# Patient Record
Sex: FEMALE | Race: WHITE | Employment: FULL TIME | URBAN - METROPOLITAN AREA
[De-identification: names, ages, dates, MRNs, and addresses within clinical notes are randomized per-mention and may not be internally consistent; named-entity substitution may affect disease eponyms.]

---

## 2017-04-20 ENCOUNTER — APPOINTMENT (EMERGENCY)
Dept: RADIOLOGY | Facility: HOSPITAL | Age: 42
End: 2017-04-20
Payer: COMMERCIAL

## 2017-04-20 ENCOUNTER — HOSPITAL ENCOUNTER (EMERGENCY)
Facility: HOSPITAL | Age: 42
Discharge: HOME | End: 2017-04-20
Attending: EMERGENCY MEDICINE
Payer: COMMERCIAL

## 2017-04-20 VITALS
HEIGHT: 66 IN | HEART RATE: 78 BPM | RESPIRATION RATE: 18 BRPM | SYSTOLIC BLOOD PRESSURE: 137 MMHG | WEIGHT: 134 LBS | OXYGEN SATURATION: 98 % | BODY MASS INDEX: 21.53 KG/M2 | DIASTOLIC BLOOD PRESSURE: 79 MMHG | TEMPERATURE: 97.4 F

## 2017-04-20 DIAGNOSIS — R42 VERTIGO: Primary | ICD-10-CM

## 2017-04-20 DIAGNOSIS — R51 HEADACHE(784.0): ICD-10-CM

## 2017-04-20 LAB
ALBUMIN SERPL BCP-MCNC: 4 G/DL (ref 3.5–5)
ALP SERPL-CCNC: 44 U/L (ref 46–116)
ALT SERPL W P-5'-P-CCNC: 24 U/L (ref 12–78)
ANION GAP SERPL CALCULATED.3IONS-SCNC: 7 MMOL/L (ref 4–13)
AST SERPL W P-5'-P-CCNC: 8 U/L (ref 5–45)
ATRIAL RATE: 81 BPM
BASOPHILS # BLD AUTO: 0.01 THOUSANDS/ΜL (ref 0–0.1)
BASOPHILS NFR BLD AUTO: 0 % (ref 0–1)
BILIRUB SERPL-MCNC: 0.58 MG/DL (ref 0.2–1)
BILIRUB UR QL STRIP: NEGATIVE
BILIRUB UR QL STRIP: NEGATIVE
BUN SERPL-MCNC: 8 MG/DL (ref 5–25)
CALCIUM SERPL-MCNC: 8.9 MG/DL (ref 8.3–10.1)
CHLORIDE SERPL-SCNC: 105 MMOL/L (ref 100–108)
CLARITY UR: CLEAR
CLARITY UR: CLEAR
CO2 SERPL-SCNC: 28 MMOL/L (ref 21–32)
COLOR UR: YELLOW
COLOR UR: YELLOW
CREAT SERPL-MCNC: 0.62 MG/DL (ref 0.6–1.3)
EOSINOPHIL # BLD AUTO: 0.08 THOUSAND/ΜL (ref 0–0.61)
EOSINOPHIL NFR BLD AUTO: 1 % (ref 0–6)
ERYTHROCYTE [DISTWIDTH] IN BLOOD BY AUTOMATED COUNT: 12.5 % (ref 11.6–15.1)
GFR SERPL CREATININE-BSD FRML MDRD: >60 ML/MIN/1.73SQ M
GLUCOSE SERPL-MCNC: 94 MG/DL (ref 65–140)
GLUCOSE UR STRIP-MCNC: NEGATIVE MG/DL
GLUCOSE UR STRIP-MCNC: NEGATIVE MG/DL
HCT VFR BLD AUTO: 42.1 % (ref 34.8–46.1)
HGB BLD-MCNC: 14.6 G/DL (ref 11.5–15.4)
HGB UR QL STRIP.AUTO: NEGATIVE
HGB UR QL STRIP.AUTO: NEGATIVE
KETONES UR STRIP-MCNC: NEGATIVE MG/DL
KETONES UR STRIP-MCNC: NEGATIVE MG/DL
LEUKOCYTE ESTERASE UR QL STRIP: NEGATIVE
LEUKOCYTE ESTERASE UR QL STRIP: NEGATIVE
LYMPHOCYTES # BLD AUTO: 1.94 THOUSANDS/ΜL (ref 0.6–4.47)
LYMPHOCYTES NFR BLD AUTO: 29 % (ref 14–44)
MCH RBC QN AUTO: 29.9 PG (ref 26.8–34.3)
MCHC RBC AUTO-ENTMCNC: 34.7 G/DL (ref 31.4–37.4)
MCV RBC AUTO: 86 FL (ref 82–98)
MONOCYTES # BLD AUTO: 0.52 THOUSAND/ΜL (ref 0.17–1.22)
MONOCYTES NFR BLD AUTO: 8 % (ref 4–12)
NEUTROPHILS # BLD AUTO: 4.07 THOUSANDS/ΜL (ref 1.85–7.62)
NEUTS SEG NFR BLD AUTO: 62 % (ref 43–75)
NITRITE UR QL STRIP: NEGATIVE
NITRITE UR QL STRIP: NEGATIVE
NRBC BLD AUTO-RTO: 0 /100 WBCS
P AXIS: 66 DEGREES
PH UR STRIP.AUTO: 7 [PH] (ref 4.5–8)
PH UR STRIP.AUTO: 7 [PH] (ref 4.5–8)
PLATELET # BLD AUTO: 228 THOUSANDS/UL (ref 149–390)
PMV BLD AUTO: 10.6 FL (ref 8.9–12.7)
POTASSIUM SERPL-SCNC: 3.8 MMOL/L (ref 3.5–5.3)
PR INTERVAL: 132 MS
PROT SERPL-MCNC: 7 G/DL (ref 6.4–8.2)
PROT UR STRIP-MCNC: NEGATIVE MG/DL
PROT UR STRIP-MCNC: NEGATIVE MG/DL
QRS AXIS: 75 DEGREES
QRSD INTERVAL: 104 MS
QT INTERVAL: 358 MS
QTC INTERVAL: 415 MS
RBC # BLD AUTO: 4.88 MILLION/UL (ref 3.81–5.12)
SODIUM SERPL-SCNC: 140 MMOL/L (ref 136–145)
SP GR UR STRIP.AUTO: 1 (ref 1–1.03)
SP GR UR STRIP.AUTO: <=1.005 (ref 1–1.03)
T WAVE AXIS: 49 DEGREES
TSH SERPL DL<=0.05 MIU/L-ACNC: 1.11 UIU/ML (ref 0.36–3.74)
UROBILINOGEN UR QL STRIP.AUTO: 0.2 E.U./DL
UROBILINOGEN UR QL STRIP.AUTO: 0.2 E.U./DL
VENTRICULAR RATE: 81 BPM
WBC # BLD AUTO: 6.63 THOUSAND/UL (ref 4.31–10.16)

## 2017-04-20 PROCEDURE — 81003 URINALYSIS AUTO W/O SCOPE: CPT

## 2017-04-20 PROCEDURE — 96375 TX/PRO/DX INJ NEW DRUG ADDON: CPT

## 2017-04-20 PROCEDURE — 84443 ASSAY THYROID STIM HORMONE: CPT | Performed by: EMERGENCY MEDICINE

## 2017-04-20 PROCEDURE — 70498 CT ANGIOGRAPHY NECK: CPT

## 2017-04-20 PROCEDURE — 36415 COLL VENOUS BLD VENIPUNCTURE: CPT | Performed by: EMERGENCY MEDICINE

## 2017-04-20 PROCEDURE — 81003 URINALYSIS AUTO W/O SCOPE: CPT | Performed by: EMERGENCY MEDICINE

## 2017-04-20 PROCEDURE — 81002 URINALYSIS NONAUTO W/O SCOPE: CPT | Performed by: EMERGENCY MEDICINE

## 2017-04-20 PROCEDURE — 93005 ELECTROCARDIOGRAM TRACING: CPT | Performed by: EMERGENCY MEDICINE

## 2017-04-20 PROCEDURE — 96374 THER/PROPH/DIAG INJ IV PUSH: CPT

## 2017-04-20 PROCEDURE — 99284 EMERGENCY DEPT VISIT MOD MDM: CPT

## 2017-04-20 PROCEDURE — 96361 HYDRATE IV INFUSION ADD-ON: CPT

## 2017-04-20 PROCEDURE — 85025 COMPLETE CBC W/AUTO DIFF WBC: CPT | Performed by: EMERGENCY MEDICINE

## 2017-04-20 PROCEDURE — 80053 COMPREHEN METABOLIC PANEL: CPT | Performed by: EMERGENCY MEDICINE

## 2017-04-20 PROCEDURE — 70496 CT ANGIOGRAPHY HEAD: CPT

## 2017-04-20 PROCEDURE — 81025 URINE PREGNANCY TEST: CPT | Performed by: EMERGENCY MEDICINE

## 2017-04-20 PROCEDURE — 96376 TX/PRO/DX INJ SAME DRUG ADON: CPT

## 2017-04-20 RX ORDER — DIAZEPAM 5 MG/ML
1 INJECTION, SOLUTION INTRAMUSCULAR; INTRAVENOUS ONCE
Status: COMPLETED | OUTPATIENT
Start: 2017-04-20 | End: 2017-04-20

## 2017-04-20 RX ORDER — BUTALBITAL, ACETAMINOPHEN AND CAFFEINE 50; 325; 40 MG/1; MG/1; MG/1
1 TABLET ORAL ONCE
Status: COMPLETED | OUTPATIENT
Start: 2017-04-20 | End: 2017-04-20

## 2017-04-20 RX ORDER — MECLIZINE HYDROCHLORIDE 25 MG/1
25 TABLET ORAL ONCE
Status: DISCONTINUED | OUTPATIENT
Start: 2017-04-20 | End: 2017-04-20 | Stop reason: HOSPADM

## 2017-04-20 RX ORDER — KETOROLAC TROMETHAMINE 30 MG/ML
15 INJECTION, SOLUTION INTRAMUSCULAR; INTRAVENOUS ONCE
Status: COMPLETED | OUTPATIENT
Start: 2017-04-20 | End: 2017-04-20

## 2017-04-20 RX ORDER — PREDNISONE 20 MG/1
60 TABLET ORAL ONCE
Status: COMPLETED | OUTPATIENT
Start: 2017-04-20 | End: 2017-04-20

## 2017-04-20 RX ORDER — BUTALBITAL, ACETAMINOPHEN AND CAFFEINE 50; 325; 40 MG/1; MG/1; MG/1
1 TABLET ORAL EVERY 6 HOURS PRN
Qty: 3 TABLET | Refills: 0 | Status: SHIPPED | OUTPATIENT
Start: 2017-04-20 | End: 2019-01-16

## 2017-04-20 RX ORDER — PREDNISONE 20 MG/1
40 TABLET ORAL DAILY
Qty: 10 TABLET | Refills: 0 | Status: SHIPPED | OUTPATIENT
Start: 2017-04-20 | End: 2019-01-16

## 2017-04-20 RX ORDER — DIAZEPAM 5 MG/1
5 TABLET ORAL 2 TIMES DAILY
Qty: 6 TABLET | Refills: 0 | Status: SHIPPED | OUTPATIENT
Start: 2017-04-20 | End: 2019-01-16

## 2017-04-20 RX ORDER — ACETAMINOPHEN 500 MG
500 TABLET ORAL EVERY 6 HOURS PRN
COMMUNITY
End: 2019-01-16

## 2017-04-20 RX ORDER — LORATADINE AND PSEUDOEPHEDRINE 10; 240 MG/1; MG/1
1 TABLET, EXTENDED RELEASE ORAL DAILY
COMMUNITY
End: 2022-06-05

## 2017-04-20 RX ADMIN — KETOROLAC TROMETHAMINE 15 MG: 30 INJECTION, SOLUTION INTRAMUSCULAR at 14:54

## 2017-04-20 RX ADMIN — SODIUM CHLORIDE 1000 ML: 0.9 INJECTION, SOLUTION INTRAVENOUS at 11:33

## 2017-04-20 RX ADMIN — DIAZEPAM 1 MG: 5 INJECTION, SOLUTION INTRAMUSCULAR; INTRAVENOUS at 13:00

## 2017-04-20 RX ADMIN — BUTALBITAL, ACETAMINOPHEN, AND CAFFEINE 1 TABLET: 50; 325; 40 TABLET ORAL at 12:59

## 2017-04-20 RX ADMIN — DIAZEPAM 1 MG: 5 INJECTION, SOLUTION INTRAMUSCULAR; INTRAVENOUS at 11:35

## 2017-04-20 RX ADMIN — PREDNISONE 60 MG: 20 TABLET ORAL at 15:02

## 2017-04-20 RX ADMIN — IOHEXOL 85 ML: 350 INJECTION, SOLUTION INTRAVENOUS at 14:18

## 2017-12-06 ENCOUNTER — GENERIC CONVERSION - ENCOUNTER (OUTPATIENT)
Dept: OBGYN CLINIC | Facility: CLINIC | Age: 42
End: 2017-12-06

## 2018-01-10 ENCOUNTER — ALLSCRIPTS OFFICE VISIT (OUTPATIENT)
Dept: OTHER | Facility: OTHER | Age: 43
End: 2018-01-10

## 2018-01-12 NOTE — PROGRESS NOTES
Assessment   1  Encounter for annual routine gynecological examination (V72 ) (Z01 390)    Discussion/Summary      1  Pap done with HPV Encouraged self-breast examination as well as calcium supplementation Continue annual mammogram Discussed different forms of birth control for future reference  Patient states she is not interested in dating Return to office in 1 year  The patient has the current Goals: Continue preventative screening  The patent has the current Barriers: No barriers  Chief Complaint   ANNUAL VISIT      History of Present Illness   HPI: This is a 79-year-old female  ( x2, age 5 and 9) who presents for annual gyn exam  Her last gyn exam was over 2 years ago  She states her menstrual cycles are regular every 4 weeks lasting 5 days with no breakthrough bleeding  She denies any changes in bowel or bladder function  Patient has not been sexually active in the last several years  She had  from her  and her divorce was finalized 2017  Review of Systems        Cardiovascular: no complaints of slow or fast heart rate, no chest pain, no palpitations, no leg claudication or lower extremity edema  Respiratory: no complaints of shortness of breath, no wheezing, no dyspnea on exertion, no orthopnea or PND  Breasts: no complaints of breast pain, breast lump or nipple discharge  Gastrointestinal: no complaints of abdominal pain, no constipation, no nausea or diarrhea, no vomiting, no bloody stools  Genitourinary: no complaints of dysuria, no incontinence, no pelvic pain, no dysmenorrhea, no vaginal discharge or abnormal vaginal bleeding  Over the past 2 weeks, how often have you been bothered by the following problems? 1 ) Little interest or pleasure in doing things? Not at all       2 ) Feeling down, depressed or hopeless? Not at all       3 ) Trouble falling asleep or sleeping too much?  Not at all       4 ) Feeling tired or having little energy? Not at all       5 ) Poor appetite or overeating? Not at all       6 ) Feeling bad about yourself, or that you are a failure, or have let yourself or your family down? Not at all       7 ) Trouble concentrating on things, such as reading a newspaper or watching television? Not at all       8 ) Moving or speaking so slowly that other people could have noticed, or the opposite, moving or speaking faster than usual? Not at all       9 ) Thoughts that you would be better off dead or of hurting yourself in some way? Not at all  Score 0       ROS reviewed  OB History   Pregnancy History (Brief):      Prior pregnancies: : 2  Para:      Delivery type: 2 vaginal       Active Problems   1  Candidiasis (112 9) (B37 9)   2  Encounter for annual routine gynecological examination (V72 31) (Z01 419)   3  Encounter for screening mammogram for breast cancer (V76 12) (Z12 31)    Past Medical History    · History of ovarian cyst (V13 29) (Z87 42)   · History of Vitamin deficiency (269 2) (E56 9)     The active problems and past medical history were reviewed and updated today  Surgical History    · History of Laparoscopy (Diagnostic)   · History of Oral Surgery Tooth Extraction [place] To[facility]   · History of Ovarian Cystectomy   · History of Tonsillectomy With Adenoidectomy     The surgical history was reviewed and updated today  Family History   Grandparent    · Family history of lung cancer (V16 1) (Z80 1)     The family history was reviewed and updated today  Social History    · Alcohol use (V49 89) (Z78 9)   ·    · 10/2017   · Drinks coffee   · Exercise: Walking   · Never a smoker  The social history was reviewed and updated today  Current Meds    1  Claritin 10 MG Oral Capsule; Therapy: (Recorded:2018) to Recorded    Allergies   1   Augmentin SUSR    Vitals    Recorded: [id number] 79:32KE   Systolic 183   Diastolic 72   Height 5 ft 6 in   Weight 138 lb    BMI Calculated 22 27   BSA Calculated 1 71   LMP [id number]     Physical Exam        Constitutional      General appearance: No acute distress, well appearing and well nourished  Pulmonary      Auscultation of lungs: Clear to auscultation  Cardiovascular      Auscultation of heart: Normal rate and rhythm, normal S1 and S2, no murmurs  Genitourinary      External genitalia: Normal and no lesions appreciated  Vagina: Normal, no lesions or dryness appreciated  Urethral meatus: Normal        Cervix: Normal, no palpable masses  Uterus: Normal, non-tender, not enlarged, and no palpable masses  Adnexa/parametria: Normal, non-tender and no fullness or masses appreciated  Chest      Breasts: Normal and no dimpling or skin changes noted  Abdomen      Abdomen: Normal, non-tender, and no organomegaly noted         Signatures    Electronically signed by : Carey Carmona DO; Karlos 10 2018 12:25PM EST                       (Author)

## 2018-01-15 ENCOUNTER — LAB CONVERSION - ENCOUNTER (OUTPATIENT)
Dept: OTHER | Facility: OTHER | Age: 43
End: 2018-01-15

## 2018-01-15 LAB
ADDITIONAL INFORMATION (HISTORICAL): NORMAL
ADEQUACY: (HISTORICAL): NORMAL
COMMENT (HISTORICAL): NORMAL
CYTOTECHNOLOGIST: (HISTORICAL): NORMAL
HPV MRNA E6/E7 (HISTORICAL): NOT DETECTED
INTERPRETATION (HISTORICAL): NORMAL
LMP (HISTORICAL): NORMAL
PREV. BX: (HISTORICAL): NORMAL
PREV. PAP (HISTORICAL): NORMAL
SOURCE (HISTORICAL): NORMAL

## 2018-01-23 VITALS
BODY MASS INDEX: 22.18 KG/M2 | DIASTOLIC BLOOD PRESSURE: 72 MMHG | SYSTOLIC BLOOD PRESSURE: 122 MMHG | HEIGHT: 66 IN | WEIGHT: 138 LBS

## 2019-01-16 ENCOUNTER — HOSPITAL ENCOUNTER (OUTPATIENT)
Dept: RADIOLOGY | Facility: HOSPITAL | Age: 44
Discharge: HOME | End: 2019-01-16
Attending: ORTHOPAEDIC SURGERY
Payer: COMMERCIAL

## 2019-01-16 ENCOUNTER — OFFICE VISIT (OUTPATIENT)
Dept: OBGYN CLINIC | Facility: HOSPITAL | Age: 44
End: 2019-01-16
Payer: COMMERCIAL

## 2019-01-16 VITALS
WEIGHT: 140 LBS | BODY MASS INDEX: 22.5 KG/M2 | SYSTOLIC BLOOD PRESSURE: 130 MMHG | HEIGHT: 66 IN | HEART RATE: 108 BPM | DIASTOLIC BLOOD PRESSURE: 80 MMHG

## 2019-01-16 DIAGNOSIS — T14.90XA INJURY: ICD-10-CM

## 2019-01-16 DIAGNOSIS — M65.332 TRIGGER MIDDLE FINGER OF LEFT HAND: Primary | ICD-10-CM

## 2019-01-16 DIAGNOSIS — M62.40 INTRINSIC MUSCLE TIGHTNESS: ICD-10-CM

## 2019-01-16 PROCEDURE — 73140 X-RAY EXAM OF FINGER(S): CPT

## 2019-01-16 PROCEDURE — 99203 OFFICE O/P NEW LOW 30 MIN: CPT | Performed by: ORTHOPAEDIC SURGERY

## 2019-01-16 PROCEDURE — 20550 NJX 1 TENDON SHEATH/LIGAMENT: CPT | Performed by: ORTHOPAEDIC SURGERY

## 2019-01-16 RX ORDER — BETAMETHASONE SODIUM PHOSPHATE AND BETAMETHASONE ACETATE 3; 3 MG/ML; MG/ML
6 INJECTION, SUSPENSION INTRA-ARTICULAR; INTRALESIONAL; INTRAMUSCULAR; SOFT TISSUE
Status: COMPLETED | OUTPATIENT
Start: 2019-01-16 | End: 2019-01-16

## 2019-01-16 RX ADMIN — BETAMETHASONE SODIUM PHOSPHATE AND BETAMETHASONE ACETATE 6 MG: 3; 3 INJECTION, SUSPENSION INTRA-ARTICULAR; INTRALESIONAL; INTRAMUSCULAR; SOFT TISSUE at 10:46

## 2019-01-16 NOTE — PROGRESS NOTES
ASSESSMENT/PLAN:    Assessment:   Left long trigger finger with intrinsic tightness    Plan:   Therapy for intrinsic tightness and trigger finger  At this time, patient elects steroid injx today    Follow Up:  6  week(s)    General Discussions:  Trigger FInger: The anatomy and physiology of trigger finger was discussed with the patient today in the office  Edema and increased contact pressure within the flexor tendons at the A1 pulley can cause pain, crepitation, and limitation of function  Treatment options include resting MP blocking splints to decrease edema, oral anti-inflammatory medications, home or formal therapy exercises, up to 2 steroid injections within the tendon sheath, or surgical release  While majority of patients do respond to conservative treatment, up to 20% may require surgical release        _____________________________________________________  CHIEF COMPLAINT:  Chief Complaint   Patient presents with    Left Middle Finger - Pain         SUBJECTIVE:  Xavier Dockery is a 39y o  year old female who presents for follow up regarding left long finger pain  Patient states that back in August, she had an older window fall and crush her hand, along the area of the MP and PIP joint of the long finger  States she got it evaluated while she was in [place], was told no fracture but finger was so swollen they said they had difficulty evaluating it  She continues to have a lot of discomfort with this hand  States it can be random and she still isn't quite sure what causes it  Describes the pain primarily between the MP and PIP joints  PAST MEDICAL HISTORY:  History reviewed  No pertinent past medical history      PAST SURGICAL HISTORY:  Past Surgical History:   Procedure Laterality Date    TONSILLECTOMY         FAMILY HISTORY:  Family History   Problem Relation Age of Onset    No Known Problems Mother     No Known Problems Father        SOCIAL HISTORY:  Social History   Substance Use Topics    Smoking status: Never Smoker    Smokeless tobacco: Never Used    Alcohol use Yes       MEDICATIONS:    Current Outpatient Prescriptions:     loratadine (CLARITIN) 5 MG chewable tablet, Chew 5 mg daily, Disp: , Rfl:     loratadine-pseudoephedrine (CLARITIN-D 24-HOUR)  mg per 24 hr tablet, Take 1 tablet by mouth daily, Disp: , Rfl:     ALLERGIES:  Allergies   Allergen Reactions    Augmentin [Amoxicillin-Pot Clavulanate] Hives     Cannot take 875 but can do a lower dose       REVIEW OF SYSTEMS:  Pertinent items are noted in HPI  A comprehensive review of systems was negative  LABS:  HgA1c: No results found for: HGBA1C  BMP:   Lab Results   Component Value Date    GLUCOSE 91 09/30/2015    CALCIUM 8 9 04/20/2017     09/30/2015    K 3 8 04/20/2017    CO2 28 04/20/2017     04/20/2017    BUN 8 04/20/2017    CREATININE 0 62 04/20/2017           _____________________________________________________  PHYSICAL EXAMINATION:  General: well developed and well nourished, alert, oriented times 3 and appears comfortable  Psychiatric: Normal  HEENT: Trachea Midline, No torticollis  Cardiovascular: No discernable arrhythmia  Pulmonary: No wheezing or stridor  Skin: No masses, erthema, lacerations, fluctation, ulcerations  Neurovascular: Sensation Intact to the Median, Ulnar, Radial Nerve, Motor Intact to the Median, Ulnar, Radial Nerve and Pulses Intact    MUSCULOSKELETAL EXAMINATION:  LEFT SIDE:  Finger:  Patient has ttp throughout the finger, but this is most notable along the flexor surface near the area of the A1 pulley  She does have a palpable nodule here with crepitation  DIP, PIP and MP joints stable to exam with ulnar/radial/volar/dorsal stress  Patient is able to actively flex at all joints and extend the finger   Patient with + Bedias testing long finger  - Lei's testing     _____________________________________________________  STUDIES REVIEWED:  Images were reviewd in PACS: Xrays of the hand that shows normal exam of the long finger      PROCEDURES PERFORMED:  Hand/upper extremity injection  Date/Time: 1/16/2019 10:46 AM  Consent given by: patient  Site marked: site marked  Supporting Documentation  Indications: pain   Procedure Details  Condition:trigger finger Location: long finger - L long A1   Needle size: 25 G  Ultrasound guidance: no  Approach: volar  Medications administered: 6 mg betamethasone acetate-betamethasone sodium phosphate 6 (3-3) mg/mL (1mL 2% lidocaine)  Patient tolerance: patient tolerated the procedure well with no immediate complications  Dressing:  Sterile dressing applied           Scribe Attestation    I,:   Hero Villarreal PA-C am acting as a scribe while in the presence of the attending physician :        I,:   Emerita Alexander MD personally performed the services described in this documentation    as scribed in my presence :

## 2019-01-23 ENCOUNTER — EVALUATION (OUTPATIENT)
Dept: OCCUPATIONAL THERAPY | Age: 44
End: 2019-01-23
Payer: COMMERCIAL

## 2019-01-23 DIAGNOSIS — M65.332 TRIGGER MIDDLE FINGER OF LEFT HAND: ICD-10-CM

## 2019-01-23 DIAGNOSIS — M62.40 INTRINSIC MUSCLE TIGHTNESS: Primary | ICD-10-CM

## 2019-01-23 PROCEDURE — G8991 OTHER PT/OT GOAL STATUS: HCPCS

## 2019-01-23 PROCEDURE — 97018 PARAFFIN BATH THERAPY: CPT

## 2019-01-23 PROCEDURE — G8990 OTHER PT/OT CURRENT STATUS: HCPCS

## 2019-01-23 PROCEDURE — 97165 OT EVAL LOW COMPLEX 30 MIN: CPT

## 2019-01-23 NOTE — PROGRESS NOTES
OT Evaluation     Today's date: 2019  Patient name: Marielos Lewis  : 1975  MRN: [id number]  Referring provider: Ruba Grant MD  Dx:   Encounter Diagnosis     ICD-10-CM    1  Trigger middle finger of left hand M65 332                   Assessment  Assessment details: Patient presents with significant pain and tenderness in the volar hand  She describes as a lightening pain  Her digital flexion is good  There is a slight flexion posture of the PIP jt which is passively correctable  She tends to hyper extend at the MP jt when she opens her hand  She is also experiencing pain in the dorsum of the finger and hand  There is a palpable nodule at the A1 pully  No locking of the the finger   strength was compromised due to pain  Fabricated an MP block splint for patient to wear at night and as needed during the day  Impairments: activity intolerance, impaired physical strength and lacks appropriate home exercise program  Understanding of Dx/Px/POC: good   Prognosis: good    Goals  STG's 4visits 1) decrease VPR to 5/10 2) improve active PIP extension to 0 degrees LTG's  6wks:  1) decrease tenderness volar palm 2) decrease nodule in palm of hand ( flexor swelling ) to promote normal tendon gliding without pain 3) decrease VPR to 3/10 or less with use 4) restore pain free motion of the finger  Plan  Patient would benefit from: skilled occupational therapy  Planned modality interventions: ultrasound and thermotherapy: paraffin bath  Planned therapy interventions: activity modification, patient education, orthotic fitting/training, stretching and home exercise program  Frequency: 2x week  Duration in weeks: 6        Subjective Evaluation    History of Present Illness  Date of onset: 2018  Mechanism of injury: Patient referred to hand therapy with left long trigger finger  A window fell down on her finger  She said the finger was swollen and she couldn't do much with it    She reports severe pain around the PIPjt radiating into the palm of her hand and dorsal phalanx  She has difficulty holding her phone  She had an injection approximately 1 wk ago  So far she doesn't notice any improvement  Patient concerned that her pain is getting worst    She describes a lightening pain since her evaluation at the physician office  Pain  Current pain rating: 10  At best pain ratin  At worst pain rating: 10  Quality: burning  Progression: worsening    Hand dominance: right    Patient Goals  Patient goals for therapy: decreased pain and increased motion          Objective     Active Range of Motion     Left Wrist   Normal active range of motion    Right Wrist   Normal active range of motion    Right Thumb   Opposition: Opposition intact  No locking of finger with active flexion  Pain with active flexion  Left Digits    Extension   Middle     PIP: 15    Additional Active Range of Motion Details  Full flexion  Moderate tenderness A1 pully       Strength/Myotome Testing     Left Wrist/Hand   Normal wrist strength     (2nd hand position)     Trial 1: 15    Trial 2: 20    Trial 3: 20    Thumb Strength  Key/Lateral Pinch     Trail 1: 14  Tip/Two-Point Pinch     Trial 1: 10  Palmar/Three-Point Pinch     Trial 1: 8    Right Wrist/Hand   Normal wrist strength     (2nd hand position)     Trial 1: 50    Trial 2: 55    Trial 3: 45    Thumb Strength   Key/Lateral Pinch     Trial 1: 14  Tip/Two-Point Pinch     Trial 1: 14  Palmar/Three-Point Pinch     Trial 1: 14      Flowsheet Rows      Most Recent Value   PT/OT G-Codes   Current Score  47   Projected Score  67   FOTO information reviewed  No   Assessment Type  Evaluation   G code set  Other PT/OT Primary   Other PT Primary Current Status ()  CK   Other PT Primary Goal Status ()  CJ          Precautions: none      Daily Treatment Diary     Manual              Intrinsic stretches             Flexor stretches Blocked PIPext             IASTM                              Exercise Diary              intrinsics rubberband                                                                                                                                                                                                                                                                       Modalities              paraffin             U/S

## 2019-01-28 ENCOUNTER — OFFICE VISIT (OUTPATIENT)
Dept: OCCUPATIONAL THERAPY | Age: 44
End: 2019-01-28
Payer: COMMERCIAL

## 2019-01-28 DIAGNOSIS — M65.332 TRIGGER MIDDLE FINGER OF LEFT HAND: Primary | ICD-10-CM

## 2019-01-28 PROCEDURE — 97018 PARAFFIN BATH THERAPY: CPT

## 2019-01-28 PROCEDURE — 97140 MANUAL THERAPY 1/> REGIONS: CPT

## 2019-01-28 NOTE — PROGRESS NOTES
Daily Note     Today's date: 2019  Patient name: Ivania Gonzalez  : 1975  MRN: [id number]  Referring provider: Audi Kemp MD  Dx:   Encounter Diagnosis     ICD-10-CM    1  Trigger middle finger of left hand M65 332                   Subjective: reports hand is better with the splint  Objective: See treatment diary below  Tender volar MP of long finger  Manual                        Intrinsic stretches  5'                     Flexor stretches                       Blocked PIPext                       IASTM 10'                                                   Exercise Diary                       intrinsics                                                                                                                                                                                                                                                                                                                                                                                                                                                                                                    Modalities                        paraffin                       U/S                                                     Assessment: Tolerated treatment well  Patient would benefit from continued OT      Plan: Progress treatment as tolerated

## 2019-01-30 ENCOUNTER — APPOINTMENT (OUTPATIENT)
Dept: OCCUPATIONAL THERAPY | Age: 44
End: 2019-01-30
Payer: COMMERCIAL

## 2019-02-04 ENCOUNTER — OFFICE VISIT (OUTPATIENT)
Dept: OCCUPATIONAL THERAPY | Age: 44
End: 2019-02-04
Payer: COMMERCIAL

## 2019-02-04 DIAGNOSIS — M62.40 INTRINSIC MUSCLE TIGHTNESS: ICD-10-CM

## 2019-02-04 DIAGNOSIS — M65.332 TRIGGER MIDDLE FINGER OF LEFT HAND: Primary | ICD-10-CM

## 2019-02-04 PROCEDURE — 97018 PARAFFIN BATH THERAPY: CPT

## 2019-02-04 PROCEDURE — 97035 APP MDLTY 1+ULTRASOUND EA 15: CPT

## 2019-02-04 NOTE — PROGRESS NOTES
Daily Note     Today's date: 2019  Patient name: Kevin Castro  : 1975  MRN: [id number]  Referring provider: Lissy Chambers MD  Dx:   Encounter Diagnosis     ICD-10-CM    1  Trigger middle finger of left hand M65 332                   Subjective: reports tenderness in palm of hand  Objective: See treatment diary below  Added blocked DIP flexion to home exercise program  Manual                      Intrinsic stretches  5'  10x                   Flexor stretches                       Blocked PIPext                       IASTM 10'  5'                                                 Exercise Diary                       intrinsics Rubberbands                                                                                                                                                                                                                                                                                                                                                                                                                                                                                                   Modalities   /                     paraffin  performed                     U/S                                                     Assessment: Tolerated treatment well  Patient would benefit from continued OT      Plan: Progress treatment as tolerated

## 2019-02-06 ENCOUNTER — OFFICE VISIT (OUTPATIENT)
Dept: OCCUPATIONAL THERAPY | Age: 44
End: 2019-02-06
Payer: COMMERCIAL

## 2019-02-06 DIAGNOSIS — M65.332 TRIGGER MIDDLE FINGER OF LEFT HAND: Primary | ICD-10-CM

## 2019-02-06 DIAGNOSIS — M62.40 INTRINSIC MUSCLE TIGHTNESS: ICD-10-CM

## 2019-02-06 PROCEDURE — 97035 APP MDLTY 1+ULTRASOUND EA 15: CPT

## 2019-02-06 PROCEDURE — 97018 PARAFFIN BATH THERAPY: CPT

## 2019-02-06 PROCEDURE — 97140 MANUAL THERAPY 1/> REGIONS: CPT

## 2019-02-06 NOTE — PROGRESS NOTES
Daily Note     Today's date: 2019  Patient name: Chio Ponce  : 1975  MRN: [id number]  Referring provider: Nakul Machado MD  Dx:   Encounter Diagnosis     ICD-10-CM    1  Trigger middle finger of left hand M65 332    2  Intrinsic muscle tightness M62 40                   Subjective: reports tenderness in palm of hand  Objective: See treatment diary below  Manual     2/6                 Intrinsic stretches  5'  10x  10x                 Flexor stretches                       Blocked PIPext                       IASTM 8'  5'  8'                                               Exercise Diary                       intrinsics Rubberbands                                                                                                                                                                                                                                                                                                                                                                                                                                                                                                  Modalities                        paraffin  performed                     U/S                                                     Assessment: Tolerated treatment well  Patient would benefit from continued OT      Plan: Progress treatment as tolerated

## 2019-02-11 ENCOUNTER — OFFICE VISIT (OUTPATIENT)
Dept: OCCUPATIONAL THERAPY | Age: 44
End: 2019-02-11
Payer: COMMERCIAL

## 2019-02-11 DIAGNOSIS — M65.332 TRIGGER MIDDLE FINGER OF LEFT HAND: Primary | ICD-10-CM

## 2019-02-11 PROCEDURE — 97018 PARAFFIN BATH THERAPY: CPT

## 2019-02-11 NOTE — PROGRESS NOTES
Daily Note     Today's date: 2019  Patient name: Coleman Quintana  : 1975  MRN: [id number]  Referring provider: Gerry Sood MD  Dx:   Encounter Diagnosis     ICD-10-CM    1  Trigger middle finger of left hand M65 332                   Subjective:  Uses splint some times during the day  Objective: See treatment diary below  Manual                  Intrinsic stretches  5'  10x  10x  10x               Flexor stretches                       Blocked PIPext                       IASTM 8'  5'  8'  5'                                             Exercise Diary                     intrinsics Rubberbands                                                                                                                                                                                                                                                                                                                                                                                                                                                                                                  Modalities                      paraffin  performed  performed                    U/S    8min pulsed                                                 Assessment: Tolerated treatment well  Patient would benefit from continued OT      Plan: Progress treatment as tolerated

## 2019-02-13 ENCOUNTER — APPOINTMENT (OUTPATIENT)
Dept: OCCUPATIONAL THERAPY | Age: 44
End: 2019-02-13
Payer: COMMERCIAL

## 2019-02-18 ENCOUNTER — OFFICE VISIT (OUTPATIENT)
Dept: OCCUPATIONAL THERAPY | Age: 44
End: 2019-02-18
Payer: COMMERCIAL

## 2019-02-18 DIAGNOSIS — M62.40 INTRINSIC MUSCLE TIGHTNESS: ICD-10-CM

## 2019-02-18 DIAGNOSIS — M65.332 TRIGGER MIDDLE FINGER OF LEFT HAND: Primary | ICD-10-CM

## 2019-02-18 PROCEDURE — 97018 PARAFFIN BATH THERAPY: CPT

## 2019-02-18 PROCEDURE — 97035 APP MDLTY 1+ULTRASOUND EA 15: CPT

## 2019-02-18 NOTE — PROGRESS NOTES
Daily Note     Today's date: 2019  Patient name: Sonny Rangel  : 1975  MRN: [id number]  Referring provider: Brooke Heath MD  Dx:   Encounter Diagnosis     ICD-10-CM    1  Trigger middle finger of left hand M65 332    2  Intrinsic muscle tightness M62 40                   Subjective:  C/o discomfort in PIP/DIP jts  Objective: See treatment diary below  Patient indicates pain on the dorsal aspect of her PIP jt  Manual                Intrinsic stretches  5'  10x  10x  10x  10x             Flexor stretches                       Blocked PIPext                       IASTM 8'  5'  8'  5'  5'                                           Exercise Diary                     intrinsics Rubberbands                                                                                                                                                                                                                                                                                                                                                                                                                                                                                                  Modalities                    paraffin  performed  performed                    U/S    8min pulsed  8min pulsed                                               Assessment: Tolerated treatment well  Patient would benefit from continued OT      Plan: Progress treatment as tolerated

## 2019-02-20 ENCOUNTER — APPOINTMENT (OUTPATIENT)
Dept: OCCUPATIONAL THERAPY | Age: 44
End: 2019-02-20
Payer: COMMERCIAL

## 2019-02-21 ENCOUNTER — APPOINTMENT (OUTPATIENT)
Dept: OCCUPATIONAL THERAPY | Age: 44
End: 2019-02-21
Payer: COMMERCIAL

## 2019-02-25 ENCOUNTER — OFFICE VISIT (OUTPATIENT)
Dept: OCCUPATIONAL THERAPY | Age: 44
End: 2019-02-25
Payer: COMMERCIAL

## 2019-02-25 DIAGNOSIS — M65.332 TRIGGER MIDDLE FINGER OF LEFT HAND: Primary | ICD-10-CM

## 2019-02-25 PROCEDURE — 97140 MANUAL THERAPY 1/> REGIONS: CPT

## 2019-02-25 PROCEDURE — 97018 PARAFFIN BATH THERAPY: CPT

## 2019-02-25 PROCEDURE — 97035 APP MDLTY 1+ULTRASOUND EA 15: CPT

## 2019-02-25 NOTE — PROGRESS NOTES
Daily Note     Today's date: 2019  Patient name: Larry Goddard  : 1975  MRN: [id number]  Referring provider: Shyann Lemon MD  Dx:   Encounter Diagnosis     ICD-10-CM    1  Trigger middle finger of left hand M65 332                   Subjective:  Patient reports continued problems with bending and using her hand  "Something just isn't right"      Objective: See treatment diary below  Continues with mention of pain in the dorsal PIP and P2  Performed joint mobs with motion (medial and lateral glides) to determine if there is a tracking issue at the PIPjt  No change in jt pain with this  Reviewed intrinsic stretches and active hook fist exercises  Manual              Intrinsic stretches  5'  10x  10x  10x  10x  10x hold           Flexor stretches                       Blocked PIPext                       IASTM 8'  5'  8'  5'  5'  5'            ORL stretches            5-10x                 Exercise Diary                   intrinsics Rubberbands                                                                                                                                                                                                                                                                                                                                                                                                                                                                                                  Modalities                    paraffin  performed  performed                    U/S    8min pulsed  8min pulsed                                               Assessment: Tolerated treatment well  Patient would benefit from continued OT      Plan: Progress treatment as tolerated

## 2019-02-26 ENCOUNTER — TRANSCRIBE ORDERS (OUTPATIENT)
Dept: ADMINISTRATIVE | Facility: HOSPITAL | Age: 44
End: 2019-02-26

## 2019-02-26 DIAGNOSIS — Z12.31 ENCOUNTER FOR SCREENING MAMMOGRAM FOR MALIGNANT NEOPLASM OF BREAST: Primary | ICD-10-CM

## 2019-02-27 ENCOUNTER — OFFICE VISIT (OUTPATIENT)
Dept: OBGYN CLINIC | Facility: HOSPITAL | Age: 44
End: 2019-02-27
Payer: COMMERCIAL

## 2019-02-27 ENCOUNTER — OFFICE VISIT (OUTPATIENT)
Dept: OCCUPATIONAL THERAPY | Age: 44
End: 2019-02-27
Payer: COMMERCIAL

## 2019-02-27 VITALS
HEIGHT: 66 IN | HEART RATE: 84 BPM | WEIGHT: 144.6 LBS | DIASTOLIC BLOOD PRESSURE: 80 MMHG | SYSTOLIC BLOOD PRESSURE: 126 MMHG | BODY MASS INDEX: 23.24 KG/M2

## 2019-02-27 DIAGNOSIS — M79.645 FINGER PAIN, LEFT: Primary | ICD-10-CM

## 2019-02-27 DIAGNOSIS — M65.332 TRIGGER MIDDLE FINGER OF LEFT HAND: Primary | ICD-10-CM

## 2019-02-27 DIAGNOSIS — M62.40 INTRINSIC MUSCLE TIGHTNESS: ICD-10-CM

## 2019-02-27 PROCEDURE — 97035 APP MDLTY 1+ULTRASOUND EA 15: CPT

## 2019-02-27 PROCEDURE — 97140 MANUAL THERAPY 1/> REGIONS: CPT

## 2019-02-27 PROCEDURE — 99213 OFFICE O/P EST LOW 20 MIN: CPT | Performed by: ORTHOPAEDIC SURGERY

## 2019-02-27 PROCEDURE — 97018 PARAFFIN BATH THERAPY: CPT

## 2019-02-27 NOTE — PROGRESS NOTES
Daily Note     Today's date: 2019  Patient name: Sheela Ganser  : 1975  MRN: [id number]  Referring provider: Nasreen Dang MD  Dx:   Encounter Diagnosis     ICD-10-CM    1  Trigger middle finger of left hand M65 332    2  Intrinsic muscle tightness M62 40                   Subjective: " I'm getting an MRI"      Objective: See treatment diary below    Patient lost her splint so a new one was fabricated   Manual            Intrinsic stretches  5'  10x  10x  10x  10x  10x hold  10x         Flexor stretches                       Blocked PIPext                       IASTM 8'  5'  8'  5'  5'  5'  5'          ORL stretches            5-10x                 Exercise Diary                   intrinsics Rubberbands                                                                                                                                                                                                                                                                                                                                                                                                                                                                                                  Modalities                  paraffin  performed  performed                    U/S    8min pulsed  8min pulsed  8min                                             Assessment: Tolerated treatment well  Plan: No further sessions scheduled  Patient will follow up with Dr Andrade Martin about her MRI

## 2019-02-27 NOTE — PROGRESS NOTES
ASSESSMENT/PLAN:    Assessment:   Left long trigger finger with pain at the PIP area with clicking  Improving intrinsic tightness  Improved long finger trigger symptoms with injection and therapy    Plan:   Patient elects to defer repeat corticosteroid injection to the left long finger trigger finger area in the event that pain returns in the future  Obtain MRI of left long finger to evaluate for any internal derangement of structures of long finger, particularly at PIP area    Follow Up: After MRI to discuss results    General Discussions:  Trigger FInger: The anatomy and physiology of trigger finger was discussed with the patient today in the office  Edema and increased contact pressure within the flexor tendons at the A1 pulley can cause pain, crepitation, and limitation of function  Treatment options include resting MP blocking splints to decrease edema, oral anti-inflammatory medications, home or formal therapy exercises, up to 2 steroid injections within the tendon sheath, or surgical release  While majority of patients do respond to conservative treatment, up to 20% may require surgical release        _____________________________________________________  CHIEF COMPLAINT:  Chief Complaint   Patient presents with    Left Middle Finger - Follow-up         SUBJECTIVE:  Ryan Flores is a 39y o  year old female who presents for follow up regarding left long finger pain  Patient states that back in August, she had an older window fall and crush her hand, along the area of the MP and PIP joint of the long finger  Since previous visit where she underwent a corticosteroid injection to the left long finger A1 area, she has pain-free days more often than days with pain at the area  She has been attending occupational therapy for this problem and she feels that this helps her; she is also using a nighttime splint    She however describes the most pain over the area of her long finger PIP joint with associated clicking and discomfort  She denies any motor sensory deficits  PAST MEDICAL HISTORY:  History reviewed  No pertinent past medical history  PAST SURGICAL HISTORY:  Past Surgical History:   Procedure Laterality Date    TONSILLECTOMY         FAMILY HISTORY:  Family History   Problem Relation Age of Onset    No Known Problems Mother     No Known Problems Father        SOCIAL HISTORY:  Social History     Tobacco Use    Smoking status: Never Smoker    Smokeless tobacco: Never Used   Substance Use Topics    Alcohol use: Yes    Drug use: No       MEDICATIONS:    Current Outpatient Medications:     loratadine (CLARITIN) 5 MG chewable tablet, Chew 5 mg daily, Disp: , Rfl:     loratadine-pseudoephedrine (CLARITIN-D 24-HOUR)  mg per 24 hr tablet, Take 1 tablet by mouth daily, Disp: , Rfl:     ALLERGIES:  Allergies   Allergen Reactions    Augmentin [Amoxicillin-Pot Clavulanate] Hives     Cannot take 875 but can do a lower dose       REVIEW OF SYSTEMS:  Pertinent items are noted in HPI  A comprehensive review of systems was negative      LABS:  HgA1c: No results found for: HGBA1C  BMP:   Lab Results   Component Value Date    GLUCOSE 91 09/30/2015    CALCIUM 8 9 04/20/2017     09/30/2015    K 3 8 04/20/2017    CO2 28 04/20/2017     04/20/2017    BUN 8 04/20/2017    CREATININE 0 62 04/20/2017           _____________________________________________________  PHYSICAL EXAMINATION:  General: well developed and well nourished, alert, oriented times 3 and appears comfortable  Psychiatric: Normal  HEENT: Trachea Midline, No torticollis  Cardiovascular: No discernable arrhythmia  Pulmonary: No wheezing or stridor  Skin: No masses, erthema, lacerations, fluctation, ulcerations  Neurovascular: Sensation Intact to the Median, Ulnar, Radial Nerve, Motor Intact to the Median, Ulnar, Radial Nerve and Pulses Intact    MUSCULOSKELETAL EXAMINATION:  LEFT SIDE:  Finger:  Minimal tenderness to palpation over the long finger A1 pulley area  Clicking felt with stress of collateral ligaments of the PIP joint  Negative Lei's test   finger warm well perfused  Intrinsic tightness at index and long digits      _____________________________________________________  STUDIES REVIEWED:  No new studies obtained today      PROCEDURES PERFORMED:  Procedures        Eleonora Guevara  02/27/19        I interviewed, took the history and examined the patient  I discuss the case with the resident and reviewed the resident's note  I supervised the resident and I agree with the resident management plan as it was presented to me  I was present in the clinic and examined the patient

## 2019-02-28 ENCOUNTER — HOSPITAL ENCOUNTER (OUTPATIENT)
Dept: RADIOLOGY | Facility: IMAGING CENTER | Age: 44
Discharge: HOME | End: 2019-02-28
Payer: COMMERCIAL

## 2019-02-28 DIAGNOSIS — M79.645 FINGER PAIN, LEFT: ICD-10-CM

## 2019-02-28 PROCEDURE — 73218 MRI UPPER EXTREMITY W/O DYE: CPT

## 2019-03-05 ENCOUNTER — ANNUAL EXAM (OUTPATIENT)
Dept: OBGYN CLINIC | Facility: CLINIC | Age: 44
End: 2019-03-05
Payer: COMMERCIAL

## 2019-03-05 VITALS
BODY MASS INDEX: 23.18 KG/M2 | HEIGHT: 66 IN | DIASTOLIC BLOOD PRESSURE: 90 MMHG | SYSTOLIC BLOOD PRESSURE: 132 MMHG | WEIGHT: 144.2 LBS

## 2019-03-05 DIAGNOSIS — Z12.39 BREAST CANCER SCREENING: ICD-10-CM

## 2019-03-05 DIAGNOSIS — Z01.419 ENCOUNTER FOR ANNUAL ROUTINE GYNECOLOGICAL EXAMINATION: Primary | ICD-10-CM

## 2019-03-05 DIAGNOSIS — N89.8 VAGINAL DISCHARGE: ICD-10-CM

## 2019-03-05 DIAGNOSIS — R10.2 PELVIC PAIN: ICD-10-CM

## 2019-03-05 PROCEDURE — 87480 CANDIDA DNA DIR PROBE: CPT | Performed by: OBSTETRICS & GYNECOLOGY

## 2019-03-05 PROCEDURE — 87660 TRICHOMONAS VAGIN DIR PROBE: CPT | Performed by: OBSTETRICS & GYNECOLOGY

## 2019-03-05 PROCEDURE — 99396 PREV VISIT EST AGE 40-64: CPT | Performed by: OBSTETRICS & GYNECOLOGY

## 2019-03-05 PROCEDURE — 87510 GARDNER VAG DNA DIR PROBE: CPT | Performed by: OBSTETRICS & GYNECOLOGY

## 2019-03-05 NOTE — PROGRESS NOTES
Assessment/Plan:    Pap smear deferred due to low risk status  Cultures obtained for bacteria vaginosis and Candida, persistent vaginal discharge  Discussed physiologic discharge  Discussed treatment options including conservative with probiotic versus hormonal support  Patient is reluctant to go on any type of hormone due to past side effects  Continue annual mammogram, discussed 3D mammography given breast density  She will check see if this is covered by her insurance  Recommend pelvic ultrasound, intermittent right lower quadrant pain, history of ovarian cyst   She is inquiring about surgical treatment with right oophorectomy  Risks and benefits of surgery reviewed  All questions answered  She will keep a pain diary  I will notify her the above results via telephone  She will return to office in 1 year or p r n  No problem-specific Assessment & Plan notes found for this encounter  Diagnoses and all orders for this visit:    Encounter for annual routine gynecological examination    Breast cancer screening  -     Mammo screening bilateral w 3d & cad; Future    Pelvic pain  -     US pelvis complete w transvaginal; Future    Other orders  -     terconazole (TERAZOL 3) 0 8 % vaginal cream; Insert into the vagina          Subjective:      Patient ID: Jessica Hardwick is a 39 y o  female  HPI     This is a 79-year-old female  ( x2, age 4, 8) presents for annual gyn exam   She states her menstrual cycles are fairly regular every 4 weeks lasting 5 days described as heavy on day 2 and 3  She denies any breakthrough bleeding  She denies any changes in bowel or bladder function  She is using tampons on a regular basis  She does complain of a persistent vaginal discharge  At time she has attributed this to a yeast infection  She does wear a panty liner due to persistent discharge  In the past we have discussed physiologic discharge    She has tried probiotics and bland douching products with minimal improvement  She has had a long history of symptomatic ovarian cyst refractory to OCPs  She has been on several different OCPs for approximately 7 years    Patient has been  now for 1 and half years  She has been sexually active on 2 separate occasions, same partner, uses condoms regularly  Offered STD screening, patient declines  Past surgical history includes laparotomy, right ovarian cystectomy at age 22 for large ovarian benign cyst   Diagnostic laparoscopy, right ovarian cystectomy with lysis of adhesions in 2001  The following portions of the patient's history were reviewed and updated as appropriate: allergies, current medications, past family history, past medical history, past social history, past surgical history and problem list     Review of Systems   Constitutional: Negative for fatigue, fever and unexpected weight change  Respiratory: Negative for cough, chest tightness, shortness of breath and wheezing  Cardiovascular: Negative  Negative for chest pain and palpitations  Gastrointestinal: Negative  Negative for abdominal distention, abdominal pain, blood in stool, constipation, diarrhea, nausea and vomiting  Genitourinary: Negative  Negative for difficulty urinating, dyspareunia, dysuria, flank pain, frequency, genital sores, hematuria, pelvic pain, urgency, vaginal bleeding, vaginal discharge and vaginal pain  Skin: Negative for rash  Objective:      /90   Ht 5' 6" (1 676 m)   Wt 65 4 kg (144 lb 3 2 oz)   LMP 02/21/2019 (Exact Date)   Breastfeeding? No   BMI 23 27 kg/m²          Physical Exam   Constitutional: She appears well-developed and well-nourished  Cardiovascular: Normal rate and regular rhythm  Pulmonary/Chest: Effort normal and breath sounds normal  Right breast exhibits no inverted nipple, no mass, no nipple discharge, no skin change and no tenderness   Left breast exhibits no inverted nipple, no mass, no nipple discharge, no skin change and no tenderness  Abdominal: Soft  Bowel sounds are normal  She exhibits no distension  There is no tenderness  There is no rebound and no guarding  Prior Pfannenstiel scar noted   Genitourinary: Vagina normal and uterus normal  There is no lesion on the right labia  There is no lesion on the left labia  Cervix exhibits no discharge and no friability  Right adnexum displays no mass, no tenderness and no fullness  Left adnexum displays no mass, no tenderness and no fullness  No vaginal discharge found

## 2019-03-06 ENCOUNTER — OFFICE VISIT (OUTPATIENT)
Dept: OBGYN CLINIC | Facility: HOSPITAL | Age: 44
End: 2019-03-06
Payer: COMMERCIAL

## 2019-03-06 VITALS
SYSTOLIC BLOOD PRESSURE: 124 MMHG | BODY MASS INDEX: 23.05 KG/M2 | WEIGHT: 143.4 LBS | HEART RATE: 102 BPM | DIASTOLIC BLOOD PRESSURE: 83 MMHG | HEIGHT: 66 IN

## 2019-03-06 DIAGNOSIS — M65.332 TRIGGER MIDDLE FINGER OF LEFT HAND: Primary | ICD-10-CM

## 2019-03-06 PROCEDURE — 20550 NJX 1 TENDON SHEATH/LIGAMENT: CPT | Performed by: ORTHOPAEDIC SURGERY

## 2019-03-06 PROCEDURE — 99213 OFFICE O/P EST LOW 20 MIN: CPT | Performed by: ORTHOPAEDIC SURGERY

## 2019-03-06 RX ORDER — BETAMETHASONE SODIUM PHOSPHATE AND BETAMETHASONE ACETATE 3; 3 MG/ML; MG/ML
6 INJECTION, SUSPENSION INTRA-ARTICULAR; INTRALESIONAL; INTRAMUSCULAR; SOFT TISSUE
Status: COMPLETED | OUTPATIENT
Start: 2019-03-06 | End: 2019-03-06

## 2019-03-06 RX ORDER — LIDOCAINE HYDROCHLORIDE 20 MG/ML
1 INJECTION, SOLUTION EPIDURAL; INFILTRATION; INTRACAUDAL; PERINEURAL
Status: COMPLETED | OUTPATIENT
Start: 2019-03-06 | End: 2019-03-06

## 2019-03-06 RX ADMIN — BETAMETHASONE SODIUM PHOSPHATE AND BETAMETHASONE ACETATE 6 MG: 3; 3 INJECTION, SUSPENSION INTRA-ARTICULAR; INTRALESIONAL; INTRAMUSCULAR; SOFT TISSUE at 11:50

## 2019-03-06 RX ADMIN — LIDOCAINE HYDROCHLORIDE 1 ML: 20 INJECTION, SOLUTION EPIDURAL; INFILTRATION; INTRACAUDAL; PERINEURAL at 11:50

## 2019-03-06 NOTE — PROGRESS NOTES
ASSESSMENT/PLAN:    Assessment:   Left long TF with intrinsic tightness    Plan:   MRI results d/w pt today  At this time, patient would like to proceed with 2nd steroid injection  She should continue with her stretches for intrinsic tightness as well  Discussed as well that crush injuries to the finger like this can take up to 9 months to heal    Follow Up:  8  week(s) if needed      _____________________________________________________  CHIEF COMPLAINT:  Chief Complaint   Patient presents with    Left Hand - Follow-up         SUBJECTIVE:  Luh Mukherjee is a 39y o  year old female who presents for follow up regarding left long trigger finger and intrinsic tightness after a window crush injury  Patient states the 1st injection worked well, but the last couple days her pain has worsened  Denies clicking at this time  Still has some discomfort in the palmar area along A1 pulley but also around the dorsal proximal phalanx to PIP joint  She has gone to therapy for IT tightness  PAST MEDICAL HISTORY:  History reviewed  No pertinent past medical history  PAST SURGICAL HISTORY:  Past Surgical History:   Procedure Laterality Date    LAPAROSCOPIC OVARIAN CYSTECTOMY Right 2001    Lysis of adhesions, Dr John Mckeon    Laparotomy, right ovarian cystectomy, Dr Gareth Allison    Laparotomy, Dr Yimi Mendosa:  Family History   Problem Relation Age of Onset    No Known Problems Mother     No Known Problems Father        SOCIAL HISTORY:  Social History     Tobacco Use    Smoking status: Never Smoker    Smokeless tobacco: Never Used   Substance Use Topics    Alcohol use:  Yes    Drug use: No       MEDICATIONS:    Current Outpatient Medications:     loratadine (CLARITIN) 5 MG chewable tablet, Chew 5 mg daily, Disp: , Rfl:     loratadine-pseudoephedrine (CLARITIN-D 24-HOUR)  mg per 24 hr tablet, Take 1 tablet by mouth daily, Disp: , Rfl:     terconazole (TERAZOL 3) 0 8 % vaginal cream, Insert into the vagina, Disp: , Rfl:     ALLERGIES:  Allergies   Allergen Reactions    Augmentin [Amoxicillin-Pot Clavulanate] Hives     Cannot take 875 but can do a lower dose       REVIEW OF SYSTEMS:  Pertinent items are noted in HPI  A comprehensive review of systems was negative  LABS:  HgA1c: No results found for: HGBA1C  BMP:   Lab Results   Component Value Date    GLUCOSE 91 09/30/2015    CALCIUM 8 9 04/20/2017     09/30/2015    K 3 8 04/20/2017    CO2 28 04/20/2017     04/20/2017    BUN 8 04/20/2017    CREATININE 0 62 04/20/2017           _____________________________________________________  PHYSICAL EXAMINATION:  General: well developed and well nourished, alert, oriented times 3 and appears comfortable  Psychiatric: Normal  HEENT: Trachea Midline, No torticollis  Cardiovascular: No discernable arrhythmia  Pulmonary: No wheezing or stridor  Skin: No masses, erthema, lacerations, fluctation, ulcerations  Neurovascular: Sensation Intact to the Median, Ulnar, Radial Nerve, Motor Intact to the Median, Ulnar, Radial Nerve and Pulses Intact    MUSCULOSKELETAL EXAMINATION:  LEFT SIDE:  Finger:  Tenderness A1 pulley long finger and Nodules  long finger with crepitation  Pt also with continued IT tightness of the long finger  _____________________________________________________  STUDIES REVIEWED:  Images were reviewd in PACS: MRI shows a normal exam of the left long finger, no concerns for bony/ligamentous/tendinous injury in area of pain        PROCEDURES PERFORMED:  Hand/upper extremity injection: L long A1  Date/Time: 3/6/2019 11:50 AM  Consent given by: patient  Site marked: site marked  Supporting Documentation  Indications: pain   Procedure Details  Condition:trigger finger Location: long finger - L long A1   Needle size: 25 G  Ultrasound guidance: no  Approach: volar  Medications administered: 6 mg betamethasone acetate-betamethasone sodium phosphate 6 (3-3) mg/mL; 1 mL lidocaine (PF) 2 %    Patient tolerance: patient tolerated the procedure well with no immediate complications  Dressing:  Sterile dressing applied             Scribe Attestation    I,:   Xavier Emery PA-C am acting as a scribe while in the presence of the attending physician :        I,:   Christiano Friday, MD personally performed the services described in this documentation    as scribed in my presence :

## 2019-03-07 LAB
CANDIDA RRNA VAG QL PROBE: NEGATIVE
G VAGINALIS RRNA GENITAL QL PROBE: NEGATIVE
T VAGINALIS RRNA GENITAL QL PROBE: NEGATIVE

## 2019-03-28 ENCOUNTER — HOSPITAL ENCOUNTER (OUTPATIENT)
Dept: RADIOLOGY | Age: 44
Discharge: HOME | End: 2019-03-28
Payer: COMMERCIAL

## 2019-03-28 VITALS — BODY MASS INDEX: 22.18 KG/M2 | HEIGHT: 66 IN | WEIGHT: 138 LBS

## 2019-03-28 DIAGNOSIS — Z12.39 BREAST CANCER SCREENING: ICD-10-CM

## 2019-03-28 PROCEDURE — 77067 SCR MAMMO BI INCL CAD: CPT

## 2019-03-28 PROCEDURE — 77063 BREAST TOMOSYNTHESIS BI: CPT

## 2019-09-27 ENCOUNTER — HOSPITAL ENCOUNTER (OUTPATIENT)
Dept: ULTRASOUND IMAGING | Facility: HOSPITAL | Age: 44
Discharge: HOME | End: 2019-09-27
Payer: COMMERCIAL

## 2019-09-27 DIAGNOSIS — R10.2 PELVIC PAIN: ICD-10-CM

## 2019-09-27 PROCEDURE — 76830 TRANSVAGINAL US NON-OB: CPT

## 2019-09-27 PROCEDURE — 76856 US EXAM PELVIC COMPLETE: CPT

## 2019-09-30 ENCOUNTER — TELEPHONE (OUTPATIENT)
Dept: OBGYN CLINIC | Facility: CLINIC | Age: 44
End: 2019-09-30

## 2019-09-30 DIAGNOSIS — N83.202 CYST OF LEFT OVARY: Primary | ICD-10-CM

## 2019-09-30 NOTE — TELEPHONE ENCOUNTER
Pt informed pelvic U/S results 9/27/19 - (L) ovarian cyst, involuting, sm amt fluid - recom repeat pelvic U/S in 10 wks - rad order in pt's chart  Pt will schedule appt time

## 2019-09-30 NOTE — TELEPHONE ENCOUNTER
----- Message from Manuelito Rashid DO sent at 9/30/2019  7:41 AM EDT -----  Inform pt US reveals small left ovarian cyst, rec f/u US 10 weeks

## 2020-03-10 ENCOUNTER — ANNUAL EXAM (OUTPATIENT)
Dept: OBGYN CLINIC | Facility: CLINIC | Age: 45
End: 2020-03-10
Payer: COMMERCIAL

## 2020-03-10 VITALS
SYSTOLIC BLOOD PRESSURE: 114 MMHG | BODY MASS INDEX: 23.59 KG/M2 | WEIGHT: 146.8 LBS | HEIGHT: 66 IN | DIASTOLIC BLOOD PRESSURE: 68 MMHG

## 2020-03-10 DIAGNOSIS — N83.202 CYST OF LEFT OVARY: ICD-10-CM

## 2020-03-10 DIAGNOSIS — Z01.419 ENCOUNTER FOR ANNUAL ROUTINE GYNECOLOGICAL EXAMINATION: Primary | ICD-10-CM

## 2020-03-10 DIAGNOSIS — Z12.39 BREAST CANCER SCREENING: ICD-10-CM

## 2020-03-10 PROCEDURE — 99396 PREV VISIT EST AGE 40-64: CPT | Performed by: OBSTETRICS & GYNECOLOGY

## 2020-03-10 PROCEDURE — G0145 SCR C/V CYTO,THINLAYER,RESCR: HCPCS | Performed by: OBSTETRICS & GYNECOLOGY

## 2020-03-10 NOTE — PROGRESS NOTES
Assessment/Plan:  Pap smear done for cytology, reflex HPV per patient request   Reviewed cervical cancer screening guidelines  All questions answered  Encouraged self-breast examination as well as calcium supplementation  Continue annual 3D mammogram   Recommend pelvic ultrasound follow-up left ovarian cyst/soft tissue mass as recommended last year  Discussed birth control option should she become sexually active  Offered STD screening, patient declines  Return to office in 1 year or p r n  No problem-specific Assessment & Plan notes found for this encounter  Diagnoses and all orders for this visit:    Encounter for annual routine gynecological examination  -     Liquid-based pap, screening    Breast cancer screening  -     Mammo screening bilateral w 3d & cad; Future    Cyst of left ovary  -     US pelvis complete w transvaginal; Future          Subjective:      Patient ID: Morgan Santizo is a 38 y o  female  HPI     This is a 41-year-old female  ( x2, age 7, 5) presents for annual gyn exam   She states her menstrual cycles are fairly regular every 4 weeks lasting 5 days with no breakthrough bleeding  She denies any changes in bowel or bladder function  She has not been sexually active in the course of the year  She does have a long history of symptomatic ovarian cyst refractory to oral contraceptive agents  She has been on several different OCPs for approximately 8 years  She did have an episode of "ovarian pain" last year where she underwent an ultrasound revealing left ovarian cyst/soft tissue mass  Recommendations at that time was to proceed with follow-up ultrasound  This is not been done  Patient states that this was not adequately conveyed to her  I would still recommend follow-up ultrasound at this time regardless of her pelvic exam today      Patient's past surgical history includes laparotomy, right ovarian cystectomy at age 22 for large ovarian benign cyst   Diagnostic laparoscopy right ovarian cystectomy with lysis of adhesions in 2001  The following portions of the patient's history were reviewed and updated as appropriate: allergies, current medications, past family history, past medical history, past social history, past surgical history and problem list     Review of Systems   Constitutional: Negative for fatigue, fever and unexpected weight change  Respiratory: Negative for cough, chest tightness, shortness of breath and wheezing  Cardiovascular: Negative  Negative for chest pain and palpitations  Gastrointestinal: Negative  Negative for abdominal distention, abdominal pain, blood in stool, constipation, diarrhea, nausea and vomiting  Genitourinary: Negative  Negative for difficulty urinating, dyspareunia, dysuria, flank pain, frequency, genital sores, hematuria, pelvic pain, urgency, vaginal bleeding, vaginal discharge and vaginal pain  Skin: Negative for rash  Objective:      /68   Ht 5' 6" (1 676 m)   Wt 66 6 kg (146 lb 12 8 oz)   LMP 02/26/2020 (Approximate)   Breastfeeding No   BMI 23 69 kg/m²          Physical Exam   Constitutional: She appears well-developed and well-nourished  Cardiovascular: Normal rate and regular rhythm  Pulmonary/Chest: Effort normal and breath sounds normal  Right breast exhibits no inverted nipple, no mass, no nipple discharge, no skin change and no tenderness  Left breast exhibits no inverted nipple, no mass, no nipple discharge, no skin change and no tenderness  Abdominal: Soft  Bowel sounds are normal  She exhibits no distension  There is no tenderness  There is no rebound and no guarding  Genitourinary: Vagina normal and uterus normal  There is no lesion on the right labia  There is no lesion on the left labia  Cervix exhibits no discharge and no friability  Right adnexum displays no mass, no tenderness and no fullness  Left adnexum displays no mass, no tenderness and no fullness   No vaginal discharge found

## 2020-03-16 LAB
LAB AP GYN PRIMARY INTERPRETATION: NORMAL
LAB AP LMP: NORMAL
Lab: NORMAL

## 2020-06-10 ENCOUNTER — TELEPHONE (OUTPATIENT)
Dept: OBGYN CLINIC | Facility: CLINIC | Age: 45
End: 2020-06-10

## 2020-06-10 ENCOUNTER — HOSPITAL ENCOUNTER (OUTPATIENT)
Dept: RADIOLOGY | Age: 45
Discharge: HOME | End: 2020-06-10
Payer: COMMERCIAL

## 2020-06-10 VITALS — HEIGHT: 66 IN | WEIGHT: 146 LBS | BODY MASS INDEX: 23.46 KG/M2

## 2020-06-10 DIAGNOSIS — Z12.39 BREAST CANCER SCREENING: ICD-10-CM

## 2020-06-10 DIAGNOSIS — Z12.31 VISIT FOR SCREENING MAMMOGRAM: ICD-10-CM

## 2020-06-10 PROCEDURE — 77063 BREAST TOMOSYNTHESIS BI: CPT

## 2020-06-10 PROCEDURE — 77067 SCR MAMMO BI INCL CAD: CPT

## 2020-07-06 ENCOUNTER — HOSPITAL ENCOUNTER (OUTPATIENT)
Dept: ULTRASOUND IMAGING | Facility: CLINIC | Age: 45
Discharge: HOME | End: 2020-07-06
Payer: COMMERCIAL

## 2020-07-06 ENCOUNTER — HOSPITAL ENCOUNTER (OUTPATIENT)
Dept: MAMMOGRAPHY | Facility: CLINIC | Age: 45
Discharge: HOME | End: 2020-07-06
Payer: COMMERCIAL

## 2020-07-06 VITALS — WEIGHT: 146 LBS | BODY MASS INDEX: 23.46 KG/M2 | TEMPERATURE: 97 F | HEIGHT: 66 IN

## 2020-07-06 DIAGNOSIS — R92.8 ABNORMAL MAMMOGRAM: ICD-10-CM

## 2020-07-06 PROCEDURE — G0279 TOMOSYNTHESIS, MAMMO: HCPCS

## 2020-07-06 PROCEDURE — 76642 ULTRASOUND BREAST LIMITED: CPT

## 2020-07-06 PROCEDURE — 77065 DX MAMMO INCL CAD UNI: CPT

## 2020-07-08 ENCOUNTER — TELEPHONE (OUTPATIENT)
Dept: OBGYN CLINIC | Facility: CLINIC | Age: 45
End: 2020-07-08

## 2020-07-08 DIAGNOSIS — N64.89 BREAST ASYMMETRY: Primary | ICD-10-CM

## 2020-07-08 NOTE — TELEPHONE ENCOUNTER
----- Message from Rc Temple DO sent at 7/7/2020  1:03 PM EDT -----  Inform patient needs follow-up diagnostic right mammogram in 6 months

## 2020-07-08 NOTE — TELEPHONE ENCOUNTER
Pt had diag 3D (R) breast mgram & (R) breast U/S 7/6/2020 f/u (R) breast asymmetry on screening mgram 6/10/2020 - (R) breast asymmetry on CC view - recom 6 month diag (R) breast mgram   Pt informed  She will schedule appt time for 1/2021  Rad order in p';s chart

## 2021-01-04 ENCOUNTER — IMMUNIZATIONS (OUTPATIENT)
Dept: FAMILY MEDICINE CLINIC | Facility: HOSPITAL | Age: 46
End: 2021-01-04

## 2021-01-04 DIAGNOSIS — Z23 ENCOUNTER FOR IMMUNIZATION: ICD-10-CM

## 2021-01-04 PROCEDURE — 0011A SARS-COV-2 / COVID-19 MRNA VACCINE (MODERNA) 100 MCG: CPT

## 2021-01-04 PROCEDURE — 91301 SARS-COV-2 / COVID-19 MRNA VACCINE (MODERNA) 100 MCG: CPT

## 2021-01-13 ENCOUNTER — AMB VIDEO VISIT (OUTPATIENT)
Dept: URGENT CARE | Facility: CLINIC | Age: 46
End: 2021-01-13

## 2021-01-13 DIAGNOSIS — L03.113 RIGHT ARM CELLULITIS: Primary | ICD-10-CM

## 2021-01-13 DIAGNOSIS — R68.89 FLU-LIKE SYMPTOMS: ICD-10-CM

## 2021-01-13 RX ORDER — SULFAMETHOXAZOLE AND TRIMETHOPRIM 800; 160 MG/1; MG/1
1 TABLET ORAL EVERY 12 HOURS SCHEDULED
Qty: 20 TABLET | Refills: 0 | Status: SHIPPED | OUTPATIENT
Start: 2021-01-13 | End: 2021-01-23

## 2021-01-13 NOTE — PATIENT INSTRUCTIONS
Will test for COVID-19  You need to isolate into results are obtain  Okay to get tested on Friday  Will start antibiotic for right upper arm cellulitis  Warm compresses 3 times per day as tolerated  Follow up with employee health as you just received covid vaccine  Monitor for worsening symptoms any increased redness, swelling streaking go directly to ER  Rest and drink extra fluids  Tylenol as needed for pain or fever  Over-the-counter cough and cold medications as needed  Start vitamin D3 2000 IU po daily, Vitamin C 1 gram PO q 12 hours and multivitamin daily  Follow up with PCP if no improvement, call prior to any doctor visits if COVID testing is not back  Go to the ER with any worsening symptoms, chest pain, shortness of breath, difficulty breathing, lethargy, confusion, dehydration or change in skin color  [address]    Your healthcare provider and/or public health staff have evaluated you and have determined that you do not need to remain in the hospital at this time  At this time you can be isolated at home where you will be monitored by staff from your local or state health department  You should carefully follow the prevention and isolation steps below until a healthcare provider or local or state health department says that you can return to your normal activities  Stay home except to get medical care    People who are mildly ill with COVID-19 are able to isolate at home during their illness  You should restrict activities outside your home, except for getting medical care  Do not go to work, school, or public areas  Avoid using public transportation, ride-sharing, or taxis  Separate yourself from other people and animals in your home    People: As much as possible, you should stay in a specific room and away from other people in your home  Also, you should use a separate bathroom, if available  Animals:  You should restrict contact with pets and other animals while you are sick with COVID-19, just like you would around other people  Although there have not been reports of pets or other animals becoming sick with COVID-19, it is still recommended that people sick with COVID-19 limit contact with animals until more information is known about the virus  When possible, have another member of your household care for your animals while you are sick  If you are sick with COVID-19, avoid contact with your pet, including petting, snuggling, being kissed or licked, and sharing food  If you must care for your pet or be around animals while you are sick, wash your hands before and after you interact with pets and wear a facemask  See COVID-19 and Animals for more information  Call ahead before visiting your doctor    If you have a medical appointment, call the healthcare provider and tell them that you have or may have COVID-19  This will help the healthcare providers office take steps to keep other people from getting infected or exposed  Wear a facemask    You should wear a facemask when you are around other people (e g , sharing a room or vehicle) or pets and before you enter a healthcare providers office  If you are not able to wear a facemask (for example, because it causes trouble breathing), then people who live with you should not stay in the same room with you, or they should wear a facemask if they enter your room  Cover your coughs and sneezes    Cover your mouth and nose with a tissue when you cough or sneeze  Throw used tissues in a lined trash can  Immediately wash your hands with soap and water for at least 20 seconds or, if soap and water are not available, clean your hands with an alcohol-based hand  that contains at least 60% alcohol  Clean your hands often    Wash your hands often with soap and water for at least 20 seconds, especially after blowing your nose, coughing, or sneezing; going to the bathroom; and before eating or preparing food  If soap and water are not readily available, use an alcohol-based hand  with at least 60% alcohol, covering all surfaces of your hands and rubbing them together until they feel dry  Soap and water are the best option if hands are visibly dirty  Avoid touching your eyes, nose, and mouth with unwashed hands  Avoid sharing personal household items    You should not share dishes, drinking glasses, cups, eating utensils, towels, or bedding with other people or pets in your home  After using these items, they should be washed thoroughly with soap and water  Clean all high-touch surfaces everyday    High touch surfaces include counters, tabletops, doorknobs, bathroom fixtures, toilets, phones, keyboards, tablets, and bedside tables  Also, clean any surfaces that may have blood, stool, or body fluids on them  Use a household cleaning spray or wipe, according to the label instructions  Labels contain instructions for safe and effective use of the cleaning product including precautions you should take when applying the product, such as wearing gloves and making sure you have good ventilation during use of the product  Monitor your symptoms    Seek prompt medical attention if your illness is worsening (e g , difficulty breathing)  Before seeking care, call your healthcare provider and tell them that you have, or are being evaluated for, COVID-19  Put on a facemask before you enter the facility  These steps will help the healthcare providers office to keep other people in the office or waiting room from getting infected or exposed  Ask your healthcare provider to call the local or [facility] health department  Persons who are placed under active monitoring or facilitated self-monitoring should follow instructions provided by their local health department or occupational health professionals, as appropriate    If you have a medical emergency and need to call 911, notify the dispatch personnel that you have, or are being evaluated for COVID-19  If possible, put on a facemask before emergency medical services arrive  Discontinuing home isolation    Patients with confirmed COVID-19 should remain under home isolation precautions until the following conditions are met:   - They have had no fever for at least 24 hours (that is one full day of no fever without the use medicine that reduces fevers)  AND  - other symptoms have improved (for example, when their cough or shortness of breath have improved)  AND  - If had mild or moderate illness, at least 10 days have passed since their symptoms first appeared or if severe illness (needed oxygen) or immunosuppressed, at least 20 days have passed since symptoms first appeared  Patients with confirmed COVID-19 should also notify close contacts (including their workplace) and ask that they self-quarantine  Currently, close contact is defined as being within 6 feet for 15 minutes or more from the period 24 hours starting 48 hours before symptom onset to the time at which the patient went into isolation  Close contacts of patients diagnosed with COVID-19 should be instructed by the patient to self-quarantine for 14 days from the last time of their last contact with the patient       Source: [employer]

## 2021-01-13 NOTE — PROGRESS NOTES
Video Visit - Beba Been 54 y o  female MRN: [id number]    REQUIRED DOCUMENTATION:         1  [facility]  2  Provider located at [address] [place] [id number]  [phone]   3  [facility] provider: Sean Smith  4  Identify all parties in room with patient during [facility] visit:  Patient   5  After connecting through [employer], patient was identified by name and date of birth  Patient was then informed that this was a Telemedicine visit and that the exam was being conducted confidentially over secure lines  My office door was closed  No one else was in the room  Patient acknowledged consent and understanding of privacy and security of the Telemedicine visit  I informed the patient that I have reviewed their record in Epic and presented the opportunity for them to ask any questions regarding the visit today  The patient agreed to participate  This is a 54year old female here today for video visit  She states she had moderna covid vaccine on 01/04/2021  She states she had soreness and headache after vaccine  Symptoms resolved  She states yesterday she woke up with some itchiness over area  She states area has now become red and hard  She has some tenderness over site  Last night she had chills  No fever  Tmax now 98  0    She also states she is no have dry cough, headache, upset stomach, fatigue and chills  She notes she had a patient who was positive for covid  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  No fevers  No sob but has some slight chest tightness  Sh    Physical Exam  Constitutional:       General: She is not in acute distress  Appearance: Normal appearance  She is not ill-appearing or toxic-appearing  Cardiovascular:      Comments: Unable to assess via video visit     Pulmonary:      Comments: Able to speak in full sentences  No dyspnea   Skin:     Comments: Right upper arm: area of redness  Neurological:      Mental Status: She is alert and oriented to person, place, and time  Psychiatric:         Mood and Affect: Mood normal          Behavior: Behavior normal          Thought Content: Thought content normal          Judgment: Judgment normal        Diagnoses and all orders for this visit:    Right arm cellulitis  -     sulfamethoxazole-trimethoprim (BACTRIM DS) 800-160 mg per tablet; Take 1 tablet by mouth every 12 (twelve) hours for 10 days  -     Novel Coronavirus (COVID-19), PCR [facility] - Collected at [facility]; Future    Flu-like symptoms      Patient Instructions   Will test for COVID-19  You need to isolate into results are obtain  Okay to get tested on Friday  Will start antibiotic for right upper arm cellulitis  Warm compresses 3 times per day as tolerated  Follow up with employee health as you just received covid vaccine  Monitor for worsening symptoms any increased redness, swelling streaking go directly to ER  Rest and drink extra fluids  Tylenol as needed for pain or fever  Over-the-counter cough and cold medications as needed  Start vitamin D3 2000 IU po daily, Vitamin C 1 gram PO q 12 hours and multivitamin daily  Follow up with PCP if no improvement, call prior to any doctor visits if COVID testing is not back  Go to the ER with any worsening symptoms, chest pain, shortness of breath, difficulty breathing, lethargy, confusion, dehydration or change in skin color  [address]    Your healthcare provider and/or public health staff have evaluated you and have determined that you do not need to remain in the hospital at this time  At this time you can be isolated at home where you will be monitored by staff from your local or state health department   You should carefully follow the prevention and isolation steps below until a healthcare provider or local or state health department says that you can return to your normal activities  Stay home except to get medical care    People who are mildly ill with COVID-19 are able to isolate at home during their illness  You should restrict activities outside your home, except for getting medical care  Do not go to work, school, or public areas  Avoid using public transportation, ride-sharing, or taxis  Separate yourself from other people and animals in your home    People: As much as possible, you should stay in a specific room and away from other people in your home  Also, you should use a separate bathroom, if available  Animals: You should restrict contact with pets and other animals while you are sick with COVID-19, just like you would around other people  Although there have not been reports of pets or other animals becoming sick with COVID-19, it is still recommended that people sick with COVID-19 limit contact with animals until more information is known about the virus  When possible, have another member of your household care for your animals while you are sick  If you are sick with COVID-19, avoid contact with your pet, including petting, snuggling, being kissed or licked, and sharing food  If you must care for your pet or be around animals while you are sick, wash your hands before and after you interact with pets and wear a facemask  See COVID-19 and Animals for more information  Call ahead before visiting your doctor    If you have a medical appointment, call the healthcare provider and tell them that you have or may have COVID-19  This will help the healthcare providers office take steps to keep other people from getting infected or exposed  Wear a facemask    You should wear a facemask when you are around other people (e g , sharing a room or vehicle) or pets and before you enter a healthcare providers office   If you are not able to wear a facemask (for example, because it causes trouble breathing), then people who live with you should not stay in the same room with you, or they should wear a facemask if they enter your room  Cover your coughs and sneezes    Cover your mouth and nose with a tissue when you cough or sneeze  Throw used tissues in a lined trash can  Immediately wash your hands with soap and water for at least 20 seconds or, if soap and water are not available, clean your hands with an alcohol-based hand  that contains at least 60% alcohol  Clean your hands often    Wash your hands often with soap and water for at least 20 seconds, especially after blowing your nose, coughing, or sneezing; going to the bathroom; and before eating or preparing food  If soap and water are not readily available, use an alcohol-based hand  with at least 60% alcohol, covering all surfaces of your hands and rubbing them together until they feel dry  Soap and water are the best option if hands are visibly dirty  Avoid touching your eyes, nose, and mouth with unwashed hands  Avoid sharing personal household items    You should not share dishes, drinking glasses, cups, eating utensils, towels, or bedding with other people or pets in your home  After using these items, they should be washed thoroughly with soap and water  Clean all high-touch surfaces everyday    High touch surfaces include counters, tabletops, doorknobs, bathroom fixtures, toilets, phones, keyboards, tablets, and bedside tables  Also, clean any surfaces that may have blood, stool, or body fluids on them  Use a household cleaning spray or wipe, according to the label instructions  Labels contain instructions for safe and effective use of the cleaning product including precautions you should take when applying the product, such as wearing gloves and making sure you have good ventilation during use of the product  Monitor your symptoms    Seek prompt medical attention if your illness is worsening (e g , difficulty breathing)   Before seeking care, call your healthcare provider and tell them that you have, or are being evaluated for, COVID-19  Put on a facemask before you enter the facility  These steps will help the healthcare providers office to keep other people in the office or waiting room from getting infected or exposed  Ask your healthcare provider to call the local or [facility] health department  Persons who are placed under active monitoring or facilitated self-monitoring should follow instructions provided by their local health department or occupational health professionals, as appropriate  If you have a medical emergency and need to call 911, notify the dispatch personnel that you have, or are being evaluated for COVID-19  If possible, put on a facemask before emergency medical services arrive  Discontinuing home isolation    Patients with confirmed COVID-19 should remain under home isolation precautions until the following conditions are met:   - They have had no fever for at least 24 hours (that is one full day of no fever without the use medicine that reduces fevers)  AND  - other symptoms have improved (for example, when their cough or shortness of breath have improved)  AND  - If had mild or moderate illness, at least 10 days have passed since their symptoms first appeared or if severe illness (needed oxygen) or immunosuppressed, at least 20 days have passed since symptoms first appeared  Patients with confirmed COVID-19 should also notify close contacts (including their workplace) and ask that they self-quarantine  Currently, close contact is defined as being within 6 feet for 15 minutes or more from the period 24 hours starting 48 hours before symptom onset to the time at which the patient went into isolation  Close contacts of patients diagnosed with COVID-19 should be instructed by the patient to self-quarantine for 14 days from the last time of their last contact with the patient       Source: [employer]         Follow up with PCP if not improved, if symptoms are worse, go to the ER

## 2021-01-13 NOTE — CARE ANYWHERE EVISITS
Visit Summary for Mandie Alex - Gender: Female - Date of Birth: [id number]  Date: [id number] - Duration: 12 minutes  Patient: Mandie Alex  Provider: Martha ESPINOSA    Patient Contact Information  Address  [place]  [phone]    Visit Topics  cellulitis at covid vaccine site and covid testing with exposure [Added By: Self - 2021-01-13]    Triage Questions   What is your current physical address in the event of a medical emergency? Answer []  Are you allergic to any medications? Answer []  Are you now or could you be pregnant? Answer []  Do you have any immune system compromise or chronic lung   disease? Answer []  Do you have any vulnerable family members in the home (infant, pregnant, cancer, elderly)? Answer []     Conversation Transcripts  [0A][0A] [Notification] You are connected with Raymon Kinney, Urgent Care Specialist [0A][Notification] Krys Alex is located in [place]  [0A][Notification] Mandie Alex has shared health history  Gómez Hager  [0A]    Diagnosis    Procedures  Value: [id number] Code: CPT-4 UNLISTED E&M SERVICE    Medications Prescribed    No prescriptions ordered    Electronically signed by: Raymon Palomino(NPI [id number])

## 2021-01-14 DIAGNOSIS — L03.113 RIGHT ARM CELLULITIS: ICD-10-CM

## 2021-01-14 PROCEDURE — U0005 INFEC AGEN DETEC AMPLI PROBE: HCPCS

## 2021-01-14 PROCEDURE — U0003 INFECTIOUS AGENT DETECTION BY NUCLEIC ACID (DNA OR RNA); SEVERE ACUTE RESPIRATORY SYNDROME CORONAVIRUS 2 (SARS-COV-2) (CORONAVIRUS DISEASE [COVID-19]), AMPLIFIED PROBE TECHNIQUE, MAKING USE OF HIGH THROUGHPUT TECHNOLOGIES AS DESCRIBED BY CMS-2020-01-R: HCPCS

## 2021-01-15 LAB — SARS-COV-2 RNA RESP QL NAA+PROBE: NEGATIVE

## 2021-01-18 ENCOUNTER — DOCUMENTATION (OUTPATIENT)
Dept: URGENT CARE | Facility: CLINIC | Age: 46
End: 2021-01-18

## 2021-02-09 ENCOUNTER — IMMUNIZATIONS (OUTPATIENT)
Dept: FAMILY MEDICINE CLINIC | Facility: HOSPITAL | Age: 46
End: 2021-02-09

## 2021-02-09 DIAGNOSIS — Z23 ENCOUNTER FOR IMMUNIZATION: Primary | ICD-10-CM

## 2021-02-09 PROCEDURE — 0012A SARS-COV-2 / COVID-19 MRNA VACCINE (MODERNA) 100 MCG: CPT

## 2021-02-09 PROCEDURE — 91301 SARS-COV-2 / COVID-19 MRNA VACCINE (MODERNA) 100 MCG: CPT

## 2021-08-20 DIAGNOSIS — G43.909 MIGRAINE HEADACHE: Primary | ICD-10-CM

## 2021-08-20 RX ORDER — BUTALBITAL, ACETAMINOPHEN AND CAFFEINE 50; 325; 40 MG/1; MG/1; MG/1
1 TABLET ORAL EVERY 4 HOURS PRN
Qty: 15 TABLET | Refills: 0 | Status: SHIPPED | OUTPATIENT
Start: 2021-08-20 | End: 2021-10-14 | Stop reason: SDUPTHER

## 2021-09-08 ENCOUNTER — OFFICE VISIT (OUTPATIENT)
Dept: INTERNAL MEDICINE CLINIC | Facility: CLINIC | Age: 46
End: 2021-09-08
Payer: COMMERCIAL

## 2021-09-08 VITALS
HEIGHT: 66 IN | DIASTOLIC BLOOD PRESSURE: 80 MMHG | TEMPERATURE: 97.5 F | OXYGEN SATURATION: 98 % | SYSTOLIC BLOOD PRESSURE: 122 MMHG | WEIGHT: 141 LBS | HEART RATE: 94 BPM | BODY MASS INDEX: 22.66 KG/M2

## 2021-09-08 DIAGNOSIS — Z13.29 SCREENING FOR THYROID DISORDER: ICD-10-CM

## 2021-09-08 DIAGNOSIS — Z11.59 NEED FOR HEPATITIS C SCREENING TEST: ICD-10-CM

## 2021-09-08 DIAGNOSIS — M79.605 LEG PAIN, POSTERIOR, LEFT: ICD-10-CM

## 2021-09-08 DIAGNOSIS — Z13.6 SCREENING FOR HEART DISEASE: ICD-10-CM

## 2021-09-08 DIAGNOSIS — R22.42 SUBCUTANEOUS NODULE OF LEFT LOWER EXTREMITY: Primary | ICD-10-CM

## 2021-09-08 DIAGNOSIS — Z13.1 SCREENING FOR DIABETES MELLITUS: ICD-10-CM

## 2021-09-08 DIAGNOSIS — Z00.00 ENCOUNTER FOR PREVENTIVE CARE: ICD-10-CM

## 2021-09-08 PROCEDURE — 99204 OFFICE O/P NEW MOD 45 MIN: CPT | Performed by: NURSE PRACTITIONER

## 2021-09-08 RX ORDER — DIPHENOXYLATE HYDROCHLORIDE AND ATROPINE SULFATE 2.5; .025 MG/1; MG/1
1 TABLET ORAL DAILY
COMMUNITY
End: 2022-06-05

## 2021-09-08 NOTE — PROGRESS NOTES
Assessment/Plan:    Subcutaneous nodule of left lower extremity  Tender subcutaneous nodule on the left posterior thigh  No redness, swelling, or warmth  Unlikely that this is a clot but with hx of long car travel at time of onset and pain not alleviated by nsaids, will get stat duplex to rule out clot  Will also get us of the subcutaneous nodule for further evaluation  Seems to be most consistent with a cyst   She can try warm compresses  Leg pain, posterior, left  As above, subcutaneous nodule on posterior thigh with onset following a long car trip  Reassured pt that this seems unlikely to be a clot and feels more cystic on exam  but will evaluate further with a duplex and and us  Diagnoses and all orders for this visit:    Subcutaneous nodule of left lower extremity  -     US MSK limited; Future    Need for hepatitis C screening test  -     Hepatitis C antibody; Future    Screening for heart disease  -     Lipid panel; Future    Screening for diabetes mellitus  -     Hemoglobin A1C; Future  -     Comprehensive metabolic panel; Future    Screening for thyroid disorder  -     TSH, 3rd generation with Free T4 reflex; Future    Encounter for preventive care  -     Lipid panel; Future  -     Hemoglobin A1C; Future  -     Comprehensive metabolic panel; Future  -     CBC and differential; Future    Leg pain, posterior, left  -     VAS lower limb venous duplex study, unilateral/limited; Future    Other orders  -     Cancel: Hepatitis C Antibody ([facility]); Future  -     Cancel: HIV 1/2 Antigen/Antibody (4th Generation) w Reflex [facility]; Future  -     Cancel: Mammo screening bilateral w 3d & cad; Future  -     multivitamin (THERAGRAN) TABS; Take 1 tablet by mouth daily  -     Butalbital-APAP-Caffeine (FIORICET PO); Take by mouth  -     loratadine-pseudoephedrine (CLARITIN-D 12-HOUR) 5-120 mg per tablet; Take 1 tablet by mouth 2 (two) times a day  -     Cholecalciferol (VITAMIN D3 PO);  Take by mouth          Subjective:      Patient ID: Poonam Welsh is a 51 y o  female  Pt is a 52 y/o female here to establish care and for evaluation of l posterior thigh pain  She notes that she was in [place] 8/22-8/30 and started with the localized pain in the distal posterior thigh, worsened on the drive home  She noted a palpable subcutaneous nodule at the area, and the last week she has had pain when bearing weight on that leg  No swelling, redness, or warmth  She has been doing stretches and feels that has helped  States ibuprofen did not alleviate her pain  Today she notes that it does feel a bit better, still with the palpable nodule but less tender  She has no history of blood clot or DVT, no family history  Denies chest pain or palpitations  She felt slightly short of breath this week  The following portions of the patient's history were reviewed and updated as appropriate: allergies, current medications, past family history, past medical history, past social history, past surgical history and problem list     Review of Systems   Constitutional: Negative for appetite change, chills, fatigue and fever  Respiratory: Negative for cough, chest tightness and shortness of breath  Cardiovascular: Negative for chest pain, palpitations and leg swelling  Musculoskeletal: Positive for myalgias (L posterior thigh)  Negative for arthralgias, back pain and gait problem  Skin: Negative for color change and rash  Neurological: Negative for dizziness, weakness, light-headedness and headaches  Objective:      /80 (BP Location: Right arm)   Pulse 94   Temp 97 5 °F (36 4 °C)   Ht 5' 6" (1 676 m)   Wt 64 kg (141 lb)   SpO2 98%   BMI 22 76 kg/m²          Physical Exam  Vitals reviewed  Constitutional:       General: She is awake  She is not in acute distress  Appearance: Normal appearance  She is well-developed and well-groomed  She is not ill-appearing     Neck: Vascular: Normal carotid pulses  No carotid bruit  Cardiovascular:      Rate and Rhythm: Normal rate and regular rhythm  Pulses: Normal pulses  Heart sounds: Normal heart sounds  No murmur heard  Pulmonary:      Effort: Pulmonary effort is normal       Breath sounds: Normal breath sounds  Musculoskeletal:         General: Normal range of motion  Cervical back: Full passive range of motion without pain and normal range of motion  No rigidity  No muscular tenderness  Normal range of motion  Right upper leg: Normal       Left upper leg: Tenderness (localized tenderness over palpable subcutaneous nodule on the posterior L thigh; feels <1cm in length) present  No swelling, edema, deformity or bony tenderness  Right lower leg: No edema  Left lower leg: No edema  Legs:    Skin:     General: Skin is warm and dry  Findings: No bruising, erythema, rash or wound  Neurological:      Mental Status: She is alert and oriented to person, place, and time  Sensory: Sensation is intact  Motor: Motor function is intact  Psychiatric:         Attention and Perception: Attention normal          Mood and Affect: Mood normal          Speech: Speech normal          Behavior: Behavior normal  Behavior is cooperative  Thought Content:  Thought content normal          Cognition and Memory: Cognition normal          Judgment: Judgment normal

## 2021-09-08 NOTE — ASSESSMENT & PLAN NOTE
Tender subcutaneous nodule on the left posterior thigh  No redness, swelling, or warmth  Unlikely that this is a clot but with hx of long car travel at time of onset and pain not alleviated by nsaids, will get stat duplex to rule out clot  Will also get us of the subcutaneous nodule for further evaluation  Seems to be most consistent with a cyst   She can try warm compresses

## 2021-09-08 NOTE — ASSESSMENT & PLAN NOTE
As above, subcutaneous nodule on posterior thigh with onset following a long car trip  Reassured pt that this seems unlikely to be a clot and feels more cystic on exam  but will evaluate further with a duplex and and us

## 2021-09-10 ENCOUNTER — APPOINTMENT (OUTPATIENT)
Dept: LAB | Facility: CLINIC | Age: 46
End: 2021-09-10
Payer: COMMERCIAL

## 2021-09-10 ENCOUNTER — APPOINTMENT (OUTPATIENT)
Dept: LAB | Facility: CLINIC | Age: 46
End: 2021-09-10

## 2021-09-10 ENCOUNTER — HOSPITAL ENCOUNTER (OUTPATIENT)
Dept: VASCULAR ULTRASOUND | Facility: HOSPITAL | Age: 46
Discharge: HOME | End: 2021-09-10
Payer: COMMERCIAL

## 2021-09-10 DIAGNOSIS — Z00.8 HEALTH EXAMINATION IN POPULATION SURVEY: ICD-10-CM

## 2021-09-10 DIAGNOSIS — Z13.29 SCREENING FOR THYROID DISORDER: ICD-10-CM

## 2021-09-10 DIAGNOSIS — Z13.1 SCREENING FOR DIABETES MELLITUS: ICD-10-CM

## 2021-09-10 DIAGNOSIS — Z00.00 ENCOUNTER FOR PREVENTIVE CARE: ICD-10-CM

## 2021-09-10 DIAGNOSIS — Z11.59 NEED FOR HEPATITIS C SCREENING TEST: ICD-10-CM

## 2021-09-10 DIAGNOSIS — M79.605 LEG PAIN, POSTERIOR, LEFT: ICD-10-CM

## 2021-09-10 DIAGNOSIS — Z13.6 SCREENING FOR HEART DISEASE: ICD-10-CM

## 2021-09-10 LAB
ALBUMIN SERPL BCP-MCNC: 4.5 G/DL (ref 3.5–5)
ALP SERPL-CCNC: 58 U/L (ref 46–116)
ALT SERPL W P-5'-P-CCNC: 26 U/L (ref 12–78)
ANION GAP SERPL CALCULATED.3IONS-SCNC: 9 MMOL/L (ref 4–13)
AST SERPL W P-5'-P-CCNC: 18 U/L (ref 5–45)
BASOPHILS # BLD AUTO: 0.03 THOUSANDS/ΜL (ref 0–0.1)
BASOPHILS NFR BLD AUTO: 0 % (ref 0–1)
BILIRUB SERPL-MCNC: 0.51 MG/DL (ref 0.2–1)
BUN SERPL-MCNC: 9 MG/DL (ref 5–25)
CALCIUM SERPL-MCNC: 9 MG/DL (ref 8.3–10.1)
CHLORIDE SERPL-SCNC: 105 MMOL/L (ref 100–108)
CHOLEST SERPL-MCNC: 209 MG/DL (ref 50–200)
CO2 SERPL-SCNC: 28 MMOL/L (ref 21–32)
CREAT SERPL-MCNC: 0.79 MG/DL (ref 0.6–1.3)
EOSINOPHIL # BLD AUTO: 0.08 THOUSAND/ΜL (ref 0–0.61)
EOSINOPHIL NFR BLD AUTO: 1 % (ref 0–6)
ERYTHROCYTE [DISTWIDTH] IN BLOOD BY AUTOMATED COUNT: 12.3 % (ref 11.6–15.1)
EST. AVERAGE GLUCOSE BLD GHB EST-MCNC: 94 MG/DL
GFR SERPL CREATININE-BSD FRML MDRD: 89 ML/MIN/1.73SQ M
GLUCOSE P FAST SERPL-MCNC: 95 MG/DL (ref 65–99)
HBA1C MFR BLD: 4.9 %
HCT VFR BLD AUTO: 42.5 % (ref 34.8–46.1)
HCV AB SER QL: NORMAL
HDLC SERPL-MCNC: 88 MG/DL
HGB BLD-MCNC: 14.4 G/DL (ref 11.5–15.4)
IMM GRANULOCYTES # BLD AUTO: 0.02 THOUSAND/UL (ref 0–0.2)
IMM GRANULOCYTES NFR BLD AUTO: 0 % (ref 0–2)
LDLC SERPL CALC-MCNC: 115 MG/DL (ref 0–100)
LYMPHOCYTES # BLD AUTO: 1.9 THOUSANDS/ΜL (ref 0.6–4.47)
LYMPHOCYTES NFR BLD AUTO: 26 % (ref 14–44)
MCH RBC QN AUTO: 30.3 PG (ref 26.8–34.3)
MCHC RBC AUTO-ENTMCNC: 33.9 G/DL (ref 31.4–37.4)
MCV RBC AUTO: 89 FL (ref 82–98)
MONOCYTES # BLD AUTO: 0.5 THOUSAND/ΜL (ref 0.17–1.22)
MONOCYTES NFR BLD AUTO: 7 % (ref 4–12)
NEUTROPHILS # BLD AUTO: 4.76 THOUSANDS/ΜL (ref 1.85–7.62)
NEUTS SEG NFR BLD AUTO: 66 % (ref 43–75)
NONHDLC SERPL-MCNC: 121 MG/DL
NRBC BLD AUTO-RTO: 0 /100 WBCS
PLATELET # BLD AUTO: 282 THOUSANDS/UL (ref 149–390)
PMV BLD AUTO: 9.8 FL (ref 8.9–12.7)
POTASSIUM SERPL-SCNC: 4.1 MMOL/L (ref 3.5–5.3)
PROT SERPL-MCNC: 7.4 G/DL (ref 6.4–8.2)
RBC # BLD AUTO: 4.76 MILLION/UL (ref 3.81–5.12)
SODIUM SERPL-SCNC: 142 MMOL/L (ref 136–145)
TRIGL SERPL-MCNC: 28 MG/DL
TSH SERPL DL<=0.05 MIU/L-ACNC: 1.47 UIU/ML (ref 0.36–3.74)
WBC # BLD AUTO: 7.29 THOUSAND/UL (ref 4.31–10.16)

## 2021-09-10 PROCEDURE — 93971 EXTREMITY STUDY: CPT | Performed by: SURGERY

## 2021-09-10 PROCEDURE — 85025 COMPLETE CBC W/AUTO DIFF WBC: CPT

## 2021-09-10 PROCEDURE — 80053 COMPREHEN METABOLIC PANEL: CPT

## 2021-09-10 PROCEDURE — 86803 HEPATITIS C AB TEST: CPT

## 2021-09-10 PROCEDURE — 93971 EXTREMITY STUDY: CPT

## 2021-09-10 PROCEDURE — 84443 ASSAY THYROID STIM HORMONE: CPT

## 2021-09-10 PROCEDURE — 80061 LIPID PANEL: CPT

## 2021-09-10 PROCEDURE — 83036 HEMOGLOBIN GLYCOSYLATED A1C: CPT

## 2021-09-13 ENCOUNTER — HOSPITAL ENCOUNTER (OUTPATIENT)
Dept: RADIOLOGY | Age: 46
Discharge: HOME | End: 2021-09-13
Payer: COMMERCIAL

## 2021-09-13 DIAGNOSIS — R22.40: ICD-10-CM

## 2021-09-13 PROCEDURE — 76882 US LMTD JT/FCL EVL NVASC XTR: CPT

## 2021-09-29 ENCOUNTER — HOSPITAL ENCOUNTER (OUTPATIENT)
Dept: MAMMOGRAPHY | Facility: CLINIC | Age: 46
Discharge: HOME | End: 2021-09-29
Payer: COMMERCIAL

## 2021-09-29 ENCOUNTER — HOSPITAL ENCOUNTER (OUTPATIENT)
Dept: ULTRASOUND IMAGING | Facility: CLINIC | Age: 46
Discharge: HOME | End: 2021-09-29
Payer: COMMERCIAL

## 2021-09-29 VITALS — WEIGHT: 141 LBS | BODY MASS INDEX: 22.66 KG/M2 | HEIGHT: 66 IN

## 2021-09-29 DIAGNOSIS — Z09 FOLLOW UP: ICD-10-CM

## 2021-09-29 DIAGNOSIS — R92.8 ABNORMAL MAMMOGRAM: ICD-10-CM

## 2021-09-29 PROCEDURE — 76642 ULTRASOUND BREAST LIMITED: CPT

## 2021-09-29 PROCEDURE — 77066 DX MAMMO INCL CAD BI: CPT

## 2021-09-29 PROCEDURE — G0279 TOMOSYNTHESIS, MAMMO: HCPCS

## 2021-10-14 ENCOUNTER — OFFICE VISIT (OUTPATIENT)
Dept: INTERNAL MEDICINE CLINIC | Facility: CLINIC | Age: 46
End: 2021-10-14
Payer: COMMERCIAL

## 2021-10-14 VITALS
WEIGHT: 140.6 LBS | HEART RATE: 102 BPM | SYSTOLIC BLOOD PRESSURE: 122 MMHG | DIASTOLIC BLOOD PRESSURE: 74 MMHG | BODY MASS INDEX: 22.6 KG/M2 | RESPIRATION RATE: 18 BRPM | TEMPERATURE: 98.2 F | OXYGEN SATURATION: 100 % | HEIGHT: 66 IN

## 2021-10-14 DIAGNOSIS — Z13.1 SCREENING FOR DIABETES MELLITUS: ICD-10-CM

## 2021-10-14 DIAGNOSIS — G43.909 MIGRAINE HEADACHE: ICD-10-CM

## 2021-10-14 DIAGNOSIS — E78.00 PURE HYPERCHOLESTEROLEMIA: ICD-10-CM

## 2021-10-14 DIAGNOSIS — R39.9 UTI SYMPTOMS: ICD-10-CM

## 2021-10-14 DIAGNOSIS — Z11.4 SCREENING FOR HIV (HUMAN IMMUNODEFICIENCY VIRUS): ICD-10-CM

## 2021-10-14 DIAGNOSIS — Z00.00 ANNUAL PHYSICAL EXAM: Primary | ICD-10-CM

## 2021-10-14 PROBLEM — R22.42: Status: RESOLVED | Noted: 2021-09-08 | Resolved: 2021-10-14

## 2021-10-14 PROBLEM — M79.605 LEG PAIN, POSTERIOR, LEFT: Status: RESOLVED | Noted: 2021-09-08 | Resolved: 2021-10-14

## 2021-10-14 LAB
BACTERIA UR QL AUTO: ABNORMAL /HPF
BILIRUB UR QL STRIP: NEGATIVE
CLARITY UR: CLEAR
COLOR UR: YELLOW
GLUCOSE UR STRIP-MCNC: NEGATIVE MG/DL
HGB UR QL STRIP.AUTO: ABNORMAL
HYALINE CASTS #/AREA URNS LPF: ABNORMAL /LPF
KETONES UR STRIP-MCNC: NEGATIVE MG/DL
LEUKOCYTE ESTERASE UR QL STRIP: ABNORMAL
NITRITE UR QL STRIP: NEGATIVE
NON-SQ EPI CELLS URNS QL MICRO: ABNORMAL /HPF
PH UR STRIP.AUTO: 6.5 [PH]
PROT UR STRIP-MCNC: NEGATIVE MG/DL
RBC #/AREA URNS AUTO: ABNORMAL /HPF
SL AMB  POCT GLUCOSE, UA: ABNORMAL
SL AMB LEUKOCYTE ESTERASE,UA: ABNORMAL
SL AMB POCT BILIRUBIN,UA: ABNORMAL
SL AMB POCT BLOOD,UA: ABNORMAL
SL AMB POCT CLARITY,UA: ABNORMAL
SL AMB POCT COLOR,UA: CLEAR
SL AMB POCT KETONES,UA: ABNORMAL
SL AMB POCT NITRITE,UA: ABNORMAL
SL AMB POCT PH,UA: 6
SL AMB POCT SPECIFIC GRAVITY,UA: 1
SL AMB POCT URINE PROTEIN: ABNORMAL
SL AMB POCT UROBILINOGEN: ABNORMAL
SP GR UR STRIP.AUTO: 1 (ref 1–1.03)
UROBILINOGEN UR QL STRIP.AUTO: 0.2 E.U./DL
WBC #/AREA URNS AUTO: ABNORMAL /HPF

## 2021-10-14 PROCEDURE — 81001 URINALYSIS AUTO W/SCOPE: CPT | Performed by: NURSE PRACTITIONER

## 2021-10-14 PROCEDURE — 87086 URINE CULTURE/COLONY COUNT: CPT | Performed by: NURSE PRACTITIONER

## 2021-10-14 PROCEDURE — 99396 PREV VISIT EST AGE 40-64: CPT | Performed by: NURSE PRACTITIONER

## 2021-10-14 PROCEDURE — 81002 URINALYSIS NONAUTO W/O SCOPE: CPT | Performed by: NURSE PRACTITIONER

## 2021-10-14 RX ORDER — LEVOFLOXACIN 500 MG/1
500 TABLET, FILM COATED ORAL EVERY 24 HOURS
Qty: 3 TABLET | Refills: 0 | Status: SHIPPED | OUTPATIENT
Start: 2021-10-14 | End: 2021-10-17

## 2021-10-14 RX ORDER — BUTALBITAL, ACETAMINOPHEN AND CAFFEINE 50; 325; 40 MG/1; MG/1; MG/1
1 TABLET ORAL EVERY 4 HOURS PRN
Qty: 15 TABLET | Refills: 0 | Status: SHIPPED | OUTPATIENT
Start: 2021-10-14

## 2021-10-14 RX ORDER — PHENAZOPYRIDINE HYDROCHLORIDE 100 MG/1
100 TABLET, FILM COATED ORAL 3 TIMES DAILY PRN
Qty: 10 TABLET | Refills: 0 | Status: SHIPPED | OUTPATIENT
Start: 2021-10-14 | End: 2022-06-05

## 2021-10-15 LAB — BACTERIA UR CULT: NORMAL

## 2021-12-12 DIAGNOSIS — T78.40XA ALLERGIES: Primary | ICD-10-CM

## 2021-12-12 RX ORDER — LORATADINE AND PSEUDOEPHEDRINE 10; 240 MG/1; MG/1
1 TABLET, EXTENDED RELEASE ORAL DAILY
Qty: 60 TABLET | Refills: 2 | Status: SHIPPED | OUTPATIENT
Start: 2021-12-12 | End: 2022-06-05 | Stop reason: SDUPTHER

## 2022-01-15 ENCOUNTER — IMMUNIZATIONS (OUTPATIENT)
Dept: FAMILY MEDICINE CLINIC | Facility: HOSPITAL | Age: 47
End: 2022-01-15

## 2022-01-15 DIAGNOSIS — Z23 ENCOUNTER FOR IMMUNIZATION: Primary | ICD-10-CM

## 2022-01-15 PROCEDURE — 0001A COVID-19 PFIZER VACC 0.3 ML: CPT

## 2022-01-15 PROCEDURE — 91300 COVID-19 PFIZER VACC 0.3 ML: CPT

## 2022-06-05 DIAGNOSIS — T78.40XA ALLERGIES: ICD-10-CM

## 2022-06-05 RX ORDER — LORATADINE AND PSEUDOEPHEDRINE 10; 240 MG/1; MG/1
1 TABLET, EXTENDED RELEASE ORAL DAILY
Qty: 60 TABLET | Refills: 3 | Status: SHIPPED | OUTPATIENT
Start: 2022-06-05

## 2022-08-19 ENCOUNTER — APPOINTMENT (OUTPATIENT)
Dept: LAB | Age: 47
End: 2022-08-19

## 2022-08-19 DIAGNOSIS — Z00.00 ROUTINE GENERAL MEDICAL EXAMINATION AT A HEALTH CARE FACILITY: ICD-10-CM

## 2022-08-19 LAB
CHOLEST SERPL-MCNC: 207 MG/DL
EST. AVERAGE GLUCOSE BLD GHB EST-MCNC: 105 MG/DL
HBA1C MFR BLD: 5.3 %
HDLC SERPL-MCNC: 81 MG/DL
LDLC SERPL CALC-MCNC: 114 MG/DL (ref 0–100)
NONHDLC SERPL-MCNC: 126 MG/DL
TRIGL SERPL-MCNC: 60 MG/DL

## 2022-08-19 PROCEDURE — 80061 LIPID PANEL: CPT

## 2022-08-19 PROCEDURE — 83036 HEMOGLOBIN GLYCOSYLATED A1C: CPT

## 2022-08-19 PROCEDURE — 36415 COLL VENOUS BLD VENIPUNCTURE: CPT

## 2022-08-30 ENCOUNTER — TELEPHONE (OUTPATIENT)
Dept: INTERNAL MEDICINE CLINIC | Facility: CLINIC | Age: 47
End: 2022-08-30

## 2022-08-30 ENCOUNTER — TELEPHONE (OUTPATIENT)
Dept: OBGYN CLINIC | Facility: CLINIC | Age: 47
End: 2022-08-30

## 2022-08-30 DIAGNOSIS — Z12.31 ENCOUNTER FOR SCREENING MAMMOGRAM FOR MALIGNANT NEOPLASM OF BREAST: ICD-10-CM

## 2022-08-30 DIAGNOSIS — N64.89 BREAST ASYMMETRY: Primary | ICD-10-CM

## 2022-08-30 NOTE — TELEPHONE ENCOUNTER
rad orders for diag bilat mgram &(R) breast U/S placed in pt's chart - pt will schedule appt time for same (due 9/2022)  Pt has yearly appt sched for 11/30/2022

## 2022-08-30 NOTE — TELEPHONE ENCOUNTER
Fredi made an appt with you in October  She was seeing Jalil Crawley prior  She is asking if she needs to have labs done before her appt? Please advise          [phone]

## 2022-09-05 DIAGNOSIS — T78.40XA ALLERGIES: ICD-10-CM

## 2022-09-05 DIAGNOSIS — L03.012 PARONYCHIA OF FINGER OF LEFT HAND: Primary | ICD-10-CM

## 2022-09-05 RX ORDER — AMOXICILLIN AND CLAVULANATE POTASSIUM 500; 125 MG/1; MG/1
1 TABLET, FILM COATED ORAL EVERY 8 HOURS SCHEDULED
Qty: 15 TABLET | Refills: 0 | Status: SHIPPED | OUTPATIENT
Start: 2022-09-05 | End: 2022-09-10

## 2022-09-28 ENCOUNTER — HOSPITAL ENCOUNTER (OUTPATIENT)
Dept: MAMMOGRAPHY | Facility: CLINIC | Age: 47
Discharge: HOME | End: 2022-09-28
Payer: COMMERCIAL

## 2022-09-28 ENCOUNTER — TELEPHONE (OUTPATIENT)
Dept: INTERNAL MEDICINE CLINIC | Facility: CLINIC | Age: 47
End: 2022-09-28

## 2022-09-28 ENCOUNTER — OFFICE VISIT (OUTPATIENT)
Dept: URGENT CARE | Facility: CLINIC | Age: 47
End: 2022-09-28
Payer: COMMERCIAL

## 2022-09-28 ENCOUNTER — HOSPITAL ENCOUNTER (OUTPATIENT)
Dept: ULTRASOUND IMAGING | Facility: CLINIC | Age: 47
Discharge: HOME | End: 2022-09-28
Payer: COMMERCIAL

## 2022-09-28 VITALS — BODY MASS INDEX: 22.32 KG/M2 | WEIGHT: 138.89 LBS | HEIGHT: 66 IN

## 2022-09-28 VITALS
HEIGHT: 66 IN | OXYGEN SATURATION: 99 % | HEART RATE: 97 BPM | TEMPERATURE: 96.4 F | DIASTOLIC BLOOD PRESSURE: 77 MMHG | SYSTOLIC BLOOD PRESSURE: 127 MMHG | WEIGHT: 146 LBS | BODY MASS INDEX: 23.46 KG/M2 | RESPIRATION RATE: 18 BRPM

## 2022-09-28 DIAGNOSIS — N30.01 ACUTE CYSTITIS WITH HEMATURIA: Primary | ICD-10-CM

## 2022-09-28 DIAGNOSIS — N64.89 BREAST ASYMMETRY: ICD-10-CM

## 2022-09-28 DIAGNOSIS — R30.0 DYSURIA: ICD-10-CM

## 2022-09-28 DIAGNOSIS — Z12.31 ENCOUNTER FOR SCREENING MAMMOGRAM FOR MALIGNANT NEOPLASM OF BREAST: ICD-10-CM

## 2022-09-28 DIAGNOSIS — G43.909 MIGRAINE HEADACHE: ICD-10-CM

## 2022-09-28 LAB
SL AMB  POCT GLUCOSE, UA: ABNORMAL
SL AMB LEUKOCYTE ESTERASE,UA: ABNORMAL
SL AMB POCT BILIRUBIN,UA: ABNORMAL
SL AMB POCT BLOOD,UA: ABNORMAL
SL AMB POCT CLARITY,UA: ABNORMAL
SL AMB POCT COLOR,UA: ABNORMAL
SL AMB POCT KETONES,UA: ABNORMAL
SL AMB POCT NITRITE,UA: ABNORMAL
SL AMB POCT PH,UA: 5
SL AMB POCT SPECIFIC GRAVITY,UA: 1.01
SL AMB POCT URINE PROTEIN: ABNORMAL
SL AMB POCT UROBILINOGEN: 1

## 2022-09-28 PROCEDURE — 87086 URINE CULTURE/COLONY COUNT: CPT | Performed by: FAMILY MEDICINE

## 2022-09-28 PROCEDURE — 87077 CULTURE AEROBIC IDENTIFY: CPT | Performed by: FAMILY MEDICINE

## 2022-09-28 PROCEDURE — G0279 TOMOSYNTHESIS, MAMMO: HCPCS

## 2022-09-28 PROCEDURE — 81002 URINALYSIS NONAUTO W/O SCOPE: CPT | Performed by: FAMILY MEDICINE

## 2022-09-28 PROCEDURE — 76642 ULTRASOUND BREAST LIMITED: CPT

## 2022-09-28 PROCEDURE — 87186 SC STD MICRODIL/AGAR DIL: CPT | Performed by: FAMILY MEDICINE

## 2022-09-28 PROCEDURE — 77066 DX MAMMO INCL CAD BI: CPT

## 2022-09-28 PROCEDURE — 99213 OFFICE O/P EST LOW 20 MIN: CPT | Performed by: FAMILY MEDICINE

## 2022-09-28 RX ORDER — NITROFURANTOIN 25; 75 MG/1; MG/1
100 CAPSULE ORAL 2 TIMES DAILY
Qty: 10 CAPSULE | Refills: 0 | Status: SHIPPED | OUTPATIENT
Start: 2022-09-28 | End: 2022-10-03

## 2022-09-28 RX ORDER — PHENAZOPYRIDINE HYDROCHLORIDE 200 MG/1
200 TABLET, FILM COATED ORAL
Qty: 10 TABLET | Refills: 0 | Status: SHIPPED | OUTPATIENT
Start: 2022-09-28

## 2022-09-28 RX ORDER — BUTALBITAL, ACETAMINOPHEN AND CAFFEINE 50; 325; 40 MG/1; MG/1; MG/1
1 TABLET ORAL EVERY 6 HOURS PRN
Qty: 15 TABLET | Refills: 0 | Status: SHIPPED | OUTPATIENT
Start: 2022-09-28

## 2022-09-28 NOTE — TELEPHONE ENCOUNTER
Kiran Jaimes called and started having UTI symptoms last night  She has urgency, pain and spasms  Please advise    [phone]

## 2022-09-28 NOTE — PROGRESS NOTES
[id number]        NAME: Amilcar Hdez is a 49 y o  female  : 1975    MRN: [id number]  DATE: 2022  TIME: 4:23 PM    Assessment and Plan   Acute cystitis with hematuria [N30 01]  1  Acute cystitis with hematuria  nitrofurantoin (MACROBID) 100 mg capsule    phenazopyridine (PYRIDIUM) 200 mg tablet   2  Dysuria  POCT urine dip    Urine culture   3  Migraine headache           Patient Instructions       Follow up with PCP in 3-5 days  Proceed to  ER if symptoms worsen  Chief Complaint     Chief Complaint   Patient presents with    Possible UTI     Pt has had pain w/ urination/low back pain/increase in urination frequency/ha,bladder pain x 24 hrs-took azo this a m  History of Present Illness       25-year-old female presenting with acute onset of pelvic pain, pressure and burning with urination  Review of Systems   Review of Systems   Constitutional: Negative  Negative for fever  HENT: Negative  Eyes: Negative  Respiratory: Negative  Cardiovascular: Negative  Gastrointestinal: Negative  Genitourinary: Positive for dysuria and frequency  Musculoskeletal: Negative  Skin: Negative  Allergic/Immunologic: Negative  Neurological: Negative  Hematological: Negative  Psychiatric/Behavioral: Negative            Current Medications       Current Outpatient Medications:     butalbital-acetaminophen-caffeine (FIORICET,ESGIC) -40 mg per tablet, Take 1 tablet by mouth every 4 (four) hours as needed for headaches, Disp: 15 tablet, Rfl: 0    Cholecalciferol (VITAMIN D3 PO), Take by mouth, Disp: , Rfl:     loratadine-pseudoephedrine (CLARITIN-D 24-HOUR)  mg per 24 hr tablet, Take 1 tablet by mouth daily, Disp: 60 tablet, Rfl: 3    nitrofurantoin (MACROBID) 100 mg capsule, Take 1 capsule (100 mg total) by mouth 2 (two) times a day for 5 days, Disp: 10 capsule, Rfl: 0    phenazopyridine (PYRIDIUM) 200 mg tablet, Take 1 tablet (200 mg total) by mouth 3 (three) times a day with meals, Disp: 10 tablet, Rfl: 0    Current Allergies     Allergies as of 09/28/2022 - Reviewed 09/28/2022   Allergen Reaction Noted    Augmentin [amoxicillin-pot clavulanate] Hives 04/20/2017            The following portions of the patient's history were reviewed and updated as appropriate: allergies, current medications, past family history, past medical history, past social history, past surgical history and problem list      Past Medical History:   Diagnosis Date    Finger fracture, left     Vitamin D deficiency        Past Surgical History:   Procedure Laterality Date    LAPAROSCOPIC OVARIAN CYSTECTOMY Right 2001    Lysis of adhesions, Dr Carmen Stahl    open, Dr Vizcaino Cutter EXTRACTION         Family History   Problem Relation Age of Onset    No Known Problems Mother     No Known Problems Father     No Known Problems Daughter     Thyroid disease Maternal Grandmother     Cancer Maternal Grandmother     Lung cancer Maternal Grandmother 61    Coronary artery disease Maternal Grandfather     Hyperlipidemia Maternal Grandfather     Alcohol abuse Maternal Grandfather     Parkinsonism Maternal Grandfather     Heart disease Paternal Grandmother     Heart disease Paternal Grandfather     No Known Problems Daughter     No Known Problems Sister     No Known Problems Brother     No Known Problems Sister     No Known Problems Maternal Aunt     No Known Problems Paternal Aunt     No Known Problems Paternal Uncle          Medications have been verified  Objective   /77   Pulse 97   Temp (!) 96 4 °F (35 8 °C)   Resp 18   Ht 5' 6" (1 676 m)   Wt 66 2 kg (146 lb)   LMP 09/02/2022   SpO2 99%   BMI 23 57 kg/m²   Patient's last menstrual period was 09/02/2022  Physical Exam     Physical Exam  Vitals and nursing note reviewed     Constitutional: Appearance: She is well-developed  HENT:      Head: Normocephalic  Eyes:      Pupils: Pupils are equal, round, and reactive to light  Cardiovascular:      Rate and Rhythm: Normal rate  Pulmonary:      Effort: Pulmonary effort is normal    Musculoskeletal:         General: Normal range of motion  Skin:     General: Skin is warm and dry  Neurological:      Mental Status: She is alert and oriented to person, place, and time

## 2022-09-28 NOTE — TELEPHONE ENCOUNTER
I called her back and she told me she has a scheduled teste with radiology today  She will try to go to an urgi center for treatment  Thank you

## 2022-09-30 LAB — BACTERIA UR CULT: ABNORMAL

## 2022-10-17 ENCOUNTER — OFFICE VISIT (OUTPATIENT)
Dept: INTERNAL MEDICINE CLINIC | Facility: CLINIC | Age: 47
End: 2022-10-17
Payer: COMMERCIAL

## 2022-10-17 VITALS
DIASTOLIC BLOOD PRESSURE: 86 MMHG | HEART RATE: 99 BPM | HEIGHT: 66 IN | WEIGHT: 147.4 LBS | BODY MASS INDEX: 23.69 KG/M2 | OXYGEN SATURATION: 99 % | TEMPERATURE: 97.4 F | SYSTOLIC BLOOD PRESSURE: 128 MMHG | RESPIRATION RATE: 16 BRPM

## 2022-10-17 DIAGNOSIS — Z00.00 ANNUAL PHYSICAL EXAM: Primary | ICD-10-CM

## 2022-10-17 PROBLEM — J30.2 SEASONAL ALLERGIC RHINITIS: Status: ACTIVE | Noted: 2022-10-17

## 2022-10-17 PROBLEM — M62.40 INTRINSIC MUSCLE TIGHTNESS: Status: RESOLVED | Noted: 2019-01-16 | Resolved: 2022-10-17

## 2022-10-17 PROBLEM — M65.332 TRIGGER MIDDLE FINGER OF LEFT HAND: Status: RESOLVED | Noted: 2019-01-16 | Resolved: 2022-10-17

## 2022-10-17 PROBLEM — R39.9 UTI SYMPTOMS: Status: RESOLVED | Noted: 2021-10-14 | Resolved: 2022-10-17

## 2022-10-17 PROBLEM — E78.00 PURE HYPERCHOLESTEROLEMIA: Status: RESOLVED | Noted: 2021-10-14 | Resolved: 2022-10-17

## 2022-10-17 PROBLEM — G43.009 MIGRAINE WITHOUT AURA: Status: ACTIVE | Noted: 2021-10-14

## 2022-10-17 PROCEDURE — 99396 PREV VISIT EST AGE 40-64: CPT | Performed by: INTERNAL MEDICINE

## 2022-10-17 NOTE — PATIENT INSTRUCTIONS
Wellness Visit for Adults   AMBULATORY CARE:   A wellness visit  is when you see your healthcare provider to get screened for health problems  Your healthcare provider will also give you advice on how to stay healthy  Write down your questions so you remember to ask them  Ask your healthcare provider how often you should have a wellness visit  What happens at a wellness visit:  Your healthcare provider will ask about your health, and your family history of health problems  This includes high blood pressure, heart disease, and cancer  He or she will ask if you have symptoms that concern you, if you smoke, and about your mood  You may also be asked about your intake of medicines, supplements, food, and alcohol  Any of the following may be done:  · Your weight  will be checked  Your height may also be checked so your body mass index (BMI) can be calculated  Your BMI shows if you are at a healthy weight  · Your blood pressure  and heart rate will be checked  Your temperature may also be checked  · Blood and urine tests  may be done  Blood tests may be done to check your cholesterol levels  Abnormal cholesterol levels increase your risk for heart disease and stroke  You may also need a blood or urine test to check for diabetes if you are at increased risk  Urine tests may be done to look for signs of an infection or kidney disease  · A physical exam  includes checking your heartbeat and lungs with a stethoscope  Your healthcare provider may also check your skin to look for sun damage  · Screening tests  may be recommended  A screening test is done to check for diseases that may not cause symptoms  The screening tests you may need depend on your age, gender, family history, and lifestyle habits  For example, colorectal screening may be recommended if you are 48years old or older  Screening tests you need if you are a woman:   · A Pap smear  is used to screen for cervical cancer   Pap smears are usually done every 3 to 5 years depending on your age  You may need them more often if you have had abnormal Pap smear test results in the past  Ask your healthcare provider how often you should have a Pap smear  · A mammogram  is an x-ray of your breasts to screen for breast cancer  Experts recommend mammograms every 2 years starting at age 48 years  You may need a mammogram at age 52 years or younger if you have an increased risk for breast cancer  Talk to your healthcare provider about when you should start having mammograms and how often you need them  Vaccines you may need:   · Get an influenza vaccine  every year  The influenza vaccine protects you from the flu  Several types of viruses cause the flu  The viruses change over time, so new vaccines are made each year  · Get a tetanus-diphtheria (Td) booster vaccine  every 10 years  This vaccine protects you against tetanus and diphtheria  Tetanus is a severe infection that may cause painful muscle spasms and lockjaw  Diphtheria is a severe bacterial infection that causes a thick covering in the back of your mouth and throat  · Get a human papillomavirus (HPV) vaccine  if you are female and aged 23 to 32 or male 23 to 24 and never received it  This vaccine protects you from HPV infection  HPV is the most common infection spread by sexual contact  HPV may also cause vaginal, penile, and anal cancers  · Get a pneumococcal vaccine  if you are aged 72 years or older  The pneumococcal vaccine is an injection given to protect you from pneumococcal disease  Pneumococcal disease is an infection caused by pneumococcal bacteria  The infection may cause pneumonia, meningitis, or an ear infection  · Get a shingles vaccine  if you are 60 or older, even if you have had shingles before  The shingles vaccine is an injection to protect you from the varicella-zoster virus  This is the same virus that causes chickenpox   Shingles is a painful rash that develops in people who had chickenpox or have been exposed to the virus  How to eat healthy:  My Plate is a model for planning healthy meals  It shows the types and amounts of foods that should go on your plate  Fruits and vegetables make up about half of your plate, and grains and protein make up the other half  A serving of dairy is included on the side of your plate  The amount of calories and serving sizes you need depends on your age, gender, weight, and height  Examples of healthy foods are listed below:  · Eat a variety of vegetables  such as dark green, red, and orange vegetables  You can also include canned vegetables low in sodium (salt) and frozen vegetables without added butter or sauces  · Eat a variety of fresh fruits , canned fruit in 100% juice, frozen fruit, and dried fruit  · Include whole grains  At least half of the grains you eat should be whole grains  Examples include whole-wheat bread, wheat pasta, brown rice, and whole-grain cereals such as oatmeal     · Eat a variety of protein foods such as seafood (fish and shellfish), lean meat, and poultry without skin (turkey and chicken)  Examples of lean meats include pork leg, shoulder, or tenderloin, and beef round, sirloin, tenderloin, and extra lean ground beef  Other protein foods include eggs and egg substitutes, beans, peas, soy products, nuts, and seeds  · Choose low-fat dairy products such as skim or 1% milk or low-fat yogurt, cheese, and cottage cheese  · Limit unhealthy fats  such as butter, hard margarine, and shortening  Exercise:  Exercise at least 30 minutes per day on most days of the week  Some examples of exercise include walking, biking, dancing, and swimming  You can also fit in more physical activity by taking the stairs instead of the elevator or parking farther away from stores  Include muscle strengthening activities 2 days each week  Regular exercise provides many health benefits   It helps you manage your weight, and decreases your risk for type 2 diabetes, heart disease, stroke, and high blood pressure  Exercise can also help improve your mood  Ask your healthcare provider about the best exercise plan for you  General health and safety guidelines:   · Do not smoke  Nicotine and other chemicals in cigarettes and cigars can cause lung damage  Ask your healthcare provider for information if you currently smoke and need help to quit  E-cigarettes or smokeless tobacco still contain nicotine  Talk to your healthcare provider before you use these products  · Limit alcohol  A drink of alcohol is 12 ounces of beer, 5 ounces of wine, or 1½ ounces of liquor  · Lose weight, if needed  Being overweight increases your risk of certain health conditions  These include heart disease, high blood pressure, type 2 diabetes, and certain types of cancer  · Protect your skin  Do not sunbathe or use tanning beds  Use sunscreen with a SPF 15 or higher  Apply sunscreen at least 15 minutes before you go outside  Reapply sunscreen every 2 hours  Wear protective clothing, hats, and sunglasses when you are outside  · Drive safely  Always wear your seatbelt  Make sure everyone in your car wears a seatbelt  A seatbelt can save your life if you are in an accident  Do not use your cell phone when you are driving  This could distract you and cause an accident  Pull over if you need to make a call or send a text message  · Practice safe sex  Use latex condoms if are sexually active and have more than one partner  Your healthcare provider may recommend screening tests for sexually transmitted infections (STIs)  · Wear helmets, lifejackets, and protective gear  Always wear a helmet when you ride a bike or motorcycle, go skiing, or play sports that could cause a head injury  Wear protective equipment when you play sports  Wear a lifejacket when you are on a boat or doing water sports      © Copyright [employer] 2022 Information is for End User's use only and may not be sold, redistributed or otherwise used for commercial purposes  All illustrations and images included in CareNotes® are the copyrighted property of [employer]  or [employer] Morelos  The above information is an  only  It is not intended as medical advice for individual conditions or treatments  Talk to your doctor, nurse or pharmacist before following any medical regimen to see if it is safe and effective for you  Cholesterol and Your Health   AMBULATORY CARE:   Cholesterol  is a waxy, fat-like substance  Your body uses cholesterol to make hormones and new cells, and to protect nerves  Cholesterol is made by your body  It also comes from certain foods you eat, such as meat and dairy products  Your healthcare provider can help you set goals for your cholesterol levels  He or she can help you create a plan to meet your goals  Cholesterol level goals: Your cholesterol level goals depend on your risk for heart disease, your age, and your other health conditions  The following are general guidelines:  · Total cholesterol  includes low-density lipoprotein (LDL), high-density lipoprotein (HDL), and triglyceride levels  The total cholesterol level should be lower than 200 mg/dL and is best at about 150 mg/dL  · LDL cholesterol  is called bad cholesterol  because it forms plaque in your arteries  As plaque builds up, your arteries become narrow, and less blood flows through  When plaque decreases blood flow to your heart, you may have chest pain  If plaque completely blocks an artery that brings blood to your heart, you may have a heart attack  Plaque can break off and form blood clots  Blood clots may block arteries in your brain and cause a stroke  The level should be less than 130 mg/dL and is best at about 100 mg/dL  · HDL cholesterol  is called good cholesterol  because it helps remove LDL cholesterol from your arteries   It does this by attaching to LDL cholesterol and carrying it to your liver  Your liver breaks down LDL cholesterol so your body can get rid of it  High levels of HDL cholesterol can help prevent a heart attack and stroke  Low levels of HDL cholesterol can increase your risk for heart disease, heart attack, and stroke  The level should be 60 mg/dL or higher  · Triglycerides  are a type of fat that store energy from foods you eat  High levels of triglycerides also cause plaque buildup  This can increase your risk for a heart attack or stroke  If your triglyceride level is high, your LDL cholesterol level may also be high  The level should be less than 150 mg/dL  Any of the following can increase your risk for high cholesterol:   · Smoking cigarettes    · Being overweight or obese, or not getting enough exercise    · Drinking large amounts of alcohol    · A medical condition such as hypertension (high blood pressure) or diabetes    · Certain genes passed from your parents to you    · Age older than 65 years    What you need to know about having your cholesterol levels checked: Adults 21to 39years of age should have their cholesterol levels checked every 4 to 6 years  Adults 45 years or older should have their cholesterol checked every 1 to 2 years  You may need your cholesterol checked more often, or at a younger age, if you have risk factors for heart disease  You may also need to have your cholesterol checked more often if you have other health conditions, such as diabetes  Blood tests are used to check cholesterol levels  Blood tests measure your levels of triglycerides, LDL cholesterol, and HDL cholesterol  How healthy fats affect your cholesterol levels:  Healthy fats, also called unsaturated fats, help lower LDL cholesterol and triglyceride levels  Healthy fats include the following:  · Monounsaturated fats  are found in foods such as olive oil, canola oil, avocado, nuts, and olives      · Polyunsaturated fats,  such as omega 3 fats, are found in fish, such as salmon, trout, and tuna  They can also be found in plant foods such as flaxseed, walnuts, and soybeans  How unhealthy fats affect your cholesterol levels:  Unhealthy fats increase LDL cholesterol and triglyceride levels  They are found in foods high in cholesterol, saturated fat, and trans fat:  · Cholesterol  is found in eggs, dairy, and meat  · Saturated fat  is found in butter, cheese, ice cream, whole milk, and coconut oil  Saturated fat is also found in meat, such as sausage, hot dogs, and bologna  · Trans fat  is found in liquid oils and is used in fried and baked foods  Foods that contain trans fats include chips, crackers, muffins, sweet rolls, microwave popcorn, and cookies  Treatment  for high cholesterol will also decrease your risk of heart disease, heart attack, and stroke  Treatment may include any of the following:  · Lifestyle changes  may include food, exercise, weight loss, and quitting smoking  You may also need to decrease the amount of alcohol you drink  Your healthcare provider will want you to start with lifestyle changes  Other treatment may be added if lifestyle changes are not enough  Your healthcare provider may recommend you work with a team to manage hyperlipidemia  The team may include medical experts such as a dietitian, an exercise or physical therapist, and a behavior therapist  Your family members may be included in helping you create lifestyle changes  · Medicines  may be given to lower your LDL cholesterol, triglyceride levels, or total cholesterol level  You may need medicines to lower your cholesterol if any of the following is true:    ? You have a history of stroke, TIA, unstable angina, or a heart attack  ? Your LDL cholesterol level is 190 mg/dL or higher  ? You are age 36 to 76 years, have diabetes or heart disease risk factors, and your LDL cholesterol is 70 mg/dL or higher      · Supplements  include fish oil, red yeast rice, and garlic  Fish oil may help lower your triglyceride and LDL cholesterol levels  It may also increase your HDL cholesterol level  Red yeast rice may help decrease your total cholesterol level and LDL cholesterol level  Garlic may help lower your total cholesterol level  Do not take any supplements without talking to your healthcare provider  Food changes you can make to lower your cholesterol levels:  A dietitian can help you create a healthy eating plan  He or she can show you how to read food labels and choose foods low in saturated fat, trans fats, and cholesterol  · Decrease the total amount of fat you eat  Choose lean meats, fat-free or 1% fat milk, and low-fat dairy products, such as yogurt and cheese  Try to limit or avoid red meats  Limit or do not eat fried foods or baked goods, such as cookies  · Replace unhealthy fats with healthy fats  Cook foods in olive oil or canola oil  Choose soft margarines that are low in saturated fat and trans fat  Seeds, nuts, and avocados are other examples of healthy fats  · Eat foods with omega-3 fats  Examples include salmon, tuna, mackerel, walnuts, and flaxseed  Eat fish 2 times per week  Pregnant women should not eat fish that have high levels of mercury, such as shark, swordfish, and sneha mackerel  · Increase the amount of high-fiber foods you eat  High-fiber foods can help lower your LDL cholesterol  Aim to get between 20 and 30 grams of fiber each day  Fruits and vegetables are high in fiber  Eat at least 5 servings each day  Other high-fiber foods are whole-grain or whole-wheat breads, pastas, or cereals, and brown rice  Eat 3 ounces of whole-grain foods each day  Increase fiber slowly  You may have abdominal discomfort, bloating, and gas if you add fiber to your diet too quickly  · Eat healthy protein foods  Examples include low-fat dairy products, skinless chicken and turkey, fish, and nuts      · Limit foods and drinks that are high in sugar  Your dietitian or healthcare provider can help you create daily limits for high-sugar foods and drinks  The limit may be lower if you have diabetes or another health condition  Limits can also help you lose weight if needed  Lifestyle changes you can make to lower your cholesterol levels:   · Maintain a healthy weight  Ask your healthcare provider what a healthy weight is for you  Ask him or her to help you create a weight loss plan if needed  Weight loss can decrease your total cholesterol and triglyceride levels  Weight loss may also help keep your blood pressure at a healthy level  · Be physically active throughout the day  Physical activity, such as exercise, can help lower your total cholesterol level and maintain a healthy weight  Physical activity can also help increase your HDL cholesterol level  Work with your healthcare provider to create an program that is right for you  Get at least 30 to 40 minutes of moderate physical activity most days of the week  Examples of exercise include brisk walking, swimming, or biking  Also include strength training at least 2 times each week  Your healthcare providers can help you create a physical activity plan  · Do not smoke  Nicotine and other chemicals in cigarettes and cigars can raise your cholesterol levels  Ask your healthcare provider for information if you currently smoke and need help to quit  E-cigarettes or smokeless tobacco still contain nicotine  Talk to your healthcare provider before you use these products  · Limit or do not drink alcohol  Alcohol can increase your triglyceride levels  Ask your healthcare provider before you drink alcohol  Ask how much is okay for you to drink in 24 hours or 1 week  Follow up with your doctor as directed:  Write down your questions so you remember to ask them during your visits    © Copyright [employer] 2022 Information is for End User's use only and may not be sold, redistributed or otherwise used for commercial purposes  All illustrations and images included in CareNotes® are the copyrighted property of [employer]  or [employer] More[employer]  The above information is an  only  It is not intended as medical advice for individual conditions or treatments  Talk to your doctor, nurse or pharmacist before following any medical regimen to see if it is safe and effective for you

## 2022-10-17 NOTE — PROGRESS NOTES
[address] INTERNAL MEDICINE    NAME: Carl Blanchard  AGE: 49 y o  SEX: female  : 1975     DATE: 10/17/2022     Assessment and Plan:     Problem List Items Addressed This Visit    None     Visit Diagnoses     Annual physical exam    -  Primary          Immunizations and preventive care screenings were discussed with patient today  Appropriate education was printed on patient's after visit summary  Counseling:  Alcohol/drug use: discussed moderation in alcohol intake, the recommendations for healthy alcohol use, and avoidance of illicit drug use  Dental Health: discussed importance of regular tooth brushing, flossing, and dental visits  Exercise: the importance of regular exercise/physical activity was discussed  Recommend exercise 3-5 times per week for at least 30 minutes  Immunizations discussed  Received influenza vaccine at work  Reports received tdap within past 10 years  Consider COVID bivalent  Cancer screenings discussed  She is up to date with mammo  PAP per GYN  Refuses colon cancer screening at this time  Depression Screening and Follow-up Plan: Patient was screened for depression during today's encounter  They screened negative with a PHQ-2 score of 0  Return in about 1 year (around 10/17/2023) for Annual physical      Chief Complaint:     Chief Complaint   Patient presents with   • Annual Exam      History of Present Illness:     Adult Annual Physical   Patient with migraines, ovarian cysts and allergic rhinitis here for a comprehensive physical exam  The patient reports no problems  She has had migraines, only Fioricet has helped  Stress has been triggered  Diet and Physical Activity  Diet/Nutrition: well balanced diet  Exercise: stays active at work         Depression Screening  PHQ-2/9 Depression Screening    Little interest or pleasure in doing things: 0 - not at all  Feeling down, depressed, or hopeless: 0 - not at all  PHQ-2 Score: 0  PHQ-2 Interpretation: Negative depression screen       General Health  Sleep: sleeps 6-8 hours  Hearing: normal - none   Vision: most recent eye exam <1 year ago and wears glasses  Dental: regular dental visits  /GYN Health  Patient is: perimenopausal  Last menstrual period: 10/17/22  Contraceptive method: none  Review of Systems:     Review of Systems   Constitutional: Negative for activity change, appetite change, fatigue and unexpected weight change  HENT: Positive for postnasal drip and rhinorrhea  Negative for congestion and voice change  Respiratory: Negative for cough, chest tightness, shortness of breath and wheezing  Cardiovascular: Negative for chest pain, palpitations and leg swelling  Gastrointestinal: Negative for abdominal pain, constipation, diarrhea, nausea and vomiting  Genitourinary: Negative for difficulty urinating  Musculoskeletal: Negative for arthralgias  Neurological: Positive for headaches  Negative for dizziness and numbness  Psychiatric/Behavioral: Negative for decreased concentration and dysphoric mood  The patient is not nervous/anxious         Past Medical History:     Past Medical History:   Diagnosis Date   • COVID-19 05/2022   • Finger fracture, left    • Intrinsic muscle tightness 01/16/2019   • Pure hypercholesterolemia 10/14/2021   • Trigger middle finger of left hand 01/16/2019   • UTI symptoms 10/14/2021   • Vitamin D deficiency       Past Surgical History:     Past Surgical History:   Procedure Laterality Date   • LAPAROSCOPIC OVARIAN CYSTECTOMY Right 2001    Lysis of adhesions, Dr Lino Salmeron   • [id number] [facility], Dr Jose Labs   • TONSILECTOMY AND ADNOIDECTOMY     • TONSILLECTOMY     • WISDOM TOOTH EXTRACTION        Social History:     Social History     Socioeconomic History   • Marital status: Single     Spouse name: None   • Number of children: None   • Years of education: None   • Highest education level: None   Occupational History   • None   Tobacco Use   • Smoking status: Never Smoker   • Smokeless tobacco: Never Used   Vaping Use   • Vaping Use: Never used   Substance and Sexual Activity   • Alcohol use:  Yes     Alcohol/week: 2 0 standard drinks     Types: 2 Glasses of wine per week     Comment: occasional   • Drug use: Never   • Sexual activity: Yes     Partners: Male     Birth control/protection: None, Male Sterilization   Other Topics Concern   • None   Social History Narrative    ** Merged History Encounter ** Lives with her 2 daughters    Works full time    Exercise: 4 days per week    Diet: balanced    Caffeine: 1-2 servings coffee daily        Med Surg nurse at [employer] Strain: Not on file   Food Insecurity: Not on file   Transportation Needs: Not on file   Physical Activity: Not on file   Stress: Not on file   Social Connections: Not on file   Intimate Partner Violence: Not on file   Housing Stability: Not on file      Family History:     Family History   Problem Relation Age of Onset   • No Known Problems Mother    • No Known Problems Father    • No Known Problems Sister    • No Known Problems Sister    • No Known Problems Brother    • No Known Problems Daughter    • No Known Problems Daughter    • Hyperlipidemia Maternal Grandmother    • Thyroid disease Maternal Grandmother    • Lung cancer Maternal Grandmother 61   • Coronary artery disease Maternal Grandfather    • Hyperlipidemia Maternal Grandfather    • Alcohol abuse Maternal Grandfather    • Parkinsonism Maternal Grandfather    • Heart disease Paternal Grandmother    • Heart disease Paternal Grandfather    • No Known Problems Maternal Aunt    • No Known Problems Paternal Aunt    • No Known Problems Paternal Uncle       Current Medications:     Current Outpatient Medications   Medication Sig Dispense Refill   • butalbital-acetaminophen-caffeine (FIORICET,ESGIC) -40 mg per tablet Take 1 tablet by mouth every 6 (six) hours as needed for headaches 15 tablet 0   • Cholecalciferol (VITAMIN D3 PO) Take by mouth     • loratadine-pseudoephedrine (CLARITIN-D 24-HOUR)  mg per 24 hr tablet Take 1 tablet by mouth daily 60 tablet 3   • phenazopyridine (PYRIDIUM) 200 mg tablet Take 1 tablet (200 mg total) by mouth 3 (three) times a day with meals (Patient not taking: Reported on 10/17/2022) 10 tablet 0     No current facility-administered medications for this visit  Allergies:     No Known Allergies   Physical Exam:     /86 (BP Location: Left arm, Patient Position: Sitting)   Pulse 99   Temp (!) 97 4 °F (36 3 °C) (Tympanic)   Resp 16   Ht 5' 6" (1 676 m)   Wt 66 9 kg (147 lb 6 4 oz)   SpO2 99%   BMI 23 79 kg/m²     Physical Exam  Vitals reviewed  Constitutional:       General: She is not in acute distress  Appearance: She is not diaphoretic  HENT:      Head: Normocephalic  Right Ear: Tympanic membrane and ear canal normal  There is no impacted cerumen  Left Ear: Tympanic membrane and ear canal normal  There is no impacted cerumen  Nose: Rhinorrhea present  No congestion  Mouth/Throat:      Mouth: Mucous membranes are moist       Pharynx: Oropharynx is clear  No oropharyngeal exudate or posterior oropharyngeal erythema  Eyes:      Extraocular Movements: Extraocular movements intact  Conjunctiva/sclera: Conjunctivae normal       Pupils: Pupils are equal, round, and reactive to light  Comments: Wears glasses   Neck:      Thyroid: No thyromegaly or thyroid tenderness  Cardiovascular:      Rate and Rhythm: Normal rate and regular rhythm  Pulses: Normal pulses  Heart sounds: Normal heart sounds  Pulmonary:      Effort: Pulmonary effort is normal  No respiratory distress  Breath sounds: Normal breath sounds  No wheezing     Chest:      Comments: Breast exam deferred  Abdominal: General: Bowel sounds are normal  There is no distension  Palpations: Abdomen is soft  Tenderness: There is no abdominal tenderness  Musculoskeletal:      Cervical back: Neck supple  No tenderness  Right lower leg: No edema  Left lower leg: No edema  Lymphadenopathy:      Cervical: No cervical adenopathy  Skin:     Coloration: Skin is not pale  Neurological:      General: No focal deficit present  Mental Status: She is alert and oriented to person, place, and time     Psychiatric:         Mood and Affect: Mood normal           [facility] INTERNAL MEDICINE

## 2022-11-09 ENCOUNTER — OFFICE VISIT (OUTPATIENT)
Dept: INTERNAL MEDICINE CLINIC | Facility: CLINIC | Age: 47
End: 2022-11-09

## 2022-11-09 VITALS
BODY MASS INDEX: 23.37 KG/M2 | SYSTOLIC BLOOD PRESSURE: 138 MMHG | WEIGHT: 145.4 LBS | OXYGEN SATURATION: 99 % | HEIGHT: 66 IN | TEMPERATURE: 97.7 F | DIASTOLIC BLOOD PRESSURE: 98 MMHG | HEART RATE: 108 BPM

## 2022-11-09 DIAGNOSIS — N30.01 ACUTE CYSTITIS WITH HEMATURIA: Primary | ICD-10-CM

## 2022-11-09 DIAGNOSIS — R30.0 DYSURIA: ICD-10-CM

## 2022-11-09 LAB
AMORPH URATE CRY URNS QL MICRO: ABNORMAL
BACTERIA UR QL AUTO: ABNORMAL /HPF
BILIRUB UR QL STRIP: ABNORMAL
CLARITY UR: ABNORMAL
COLOR UR: ABNORMAL
GLUCOSE UR STRIP-MCNC: NEGATIVE MG/DL
HGB UR QL STRIP.AUTO: NEGATIVE
KETONES UR STRIP-MCNC: NEGATIVE MG/DL
LEUKOCYTE ESTERASE UR QL STRIP: ABNORMAL
NITRITE UR QL STRIP: POSITIVE
NON-SQ EPI CELLS URNS QL MICRO: ABNORMAL /HPF
PH UR STRIP.AUTO: 6.5 [PH]
PROT UR STRIP-MCNC: NEGATIVE MG/DL
RBC #/AREA URNS AUTO: ABNORMAL /HPF
SL AMB  POCT GLUCOSE, UA: ABNORMAL
SL AMB LEUKOCYTE ESTERASE,UA: ABNORMAL
SL AMB POCT BILIRUBIN,UA: ABNORMAL
SL AMB POCT BLOOD,UA: ABNORMAL
SL AMB POCT CLARITY,UA: ABNORMAL
SL AMB POCT COLOR,UA: CLEAR
SL AMB POCT KETONES,UA: ABNORMAL
SL AMB POCT NITRITE,UA: ABNORMAL
SL AMB POCT PH,UA: 6
SL AMB POCT SPECIFIC GRAVITY,UA: 1.01
SL AMB POCT URINE PROTEIN: ABNORMAL
SL AMB POCT UROBILINOGEN: ABNORMAL
SP GR UR STRIP.AUTO: 1 (ref 1–1.03)
UROBILINOGEN UR STRIP-ACNC: 2 MG/DL
WBC #/AREA URNS AUTO: ABNORMAL /HPF

## 2022-11-09 RX ORDER — PHENAZOPYRIDINE HYDROCHLORIDE 200 MG/1
200 TABLET, FILM COATED ORAL
Qty: 10 TABLET | Refills: 0 | Status: SHIPPED | OUTPATIENT
Start: 2022-11-09

## 2022-11-09 RX ORDER — SULFAMETHOXAZOLE AND TRIMETHOPRIM 800; 160 MG/1; MG/1
1 TABLET ORAL EVERY 12 HOURS SCHEDULED
Qty: 14 TABLET | Refills: 0 | Status: SHIPPED | OUTPATIENT
Start: 2022-11-09 | End: 2022-11-16

## 2022-11-09 NOTE — PROGRESS NOTES
Assessment/Plan:    Problem List Items Addressed This Visit        Genitourinary    Acute cystitis with hematuria - Primary     -recurrent symptoms  Initial episode 9/28 showing E coli, pansensitive  Completed macrobid which she reports does not work for her  -will send urine for culture  -start Bactrim DS bid in the meantime and continue pyridium         Relevant Medications    sulfamethoxazole-trimethoprim (BACTRIM DS) 800-160 mg per tablet    phenazopyridine (PYRIDIUM) 200 mg tablet    Other Relevant Orders    UA w Reflex to Microscopic w Reflex to Culture - Clinic Collect      Other Visit Diagnoses     Dysuria        Relevant Orders    POCT urine dip (Completed)          Subjective:      Patient ID: Abhijit Parker is a 49 y o  female  HPI    Was treated with macrobid for E coli UTI on 9/28  Has h/o recurrent UTIs, seen by Urology and had cysto  Took pyridium     Has urethral spasm, dysuria, urgency, frequency and low back pain  Has had twinges here and there, though worse over the weekend  Has not been drinking much and with coughing from cold symptoms she has had more urethral spasm  LMP 10/17      The following portions of the patient's history were reviewed and updated as appropriate: allergies, current medications, past family history, past medical history, past social history, past surgical history and problem list       Current Outpatient Medications:   •  butalbital-acetaminophen-caffeine (FIORICET,ESGIC) -40 mg per tablet, Take 1 tablet by mouth every 6 (six) hours as needed for headaches, Disp: 15 tablet, Rfl: 0  •  Cholecalciferol (VITAMIN D3 PO), Take by mouth, Disp: , Rfl:   •  loratadine-pseudoephedrine (CLARITIN-D 24-HOUR)  mg per 24 hr tablet, Take 1 tablet by mouth daily, Disp: 60 tablet, Rfl: 3  •  phenazopyridine (PYRIDIUM) 200 mg tablet, Take 1 tablet (200 mg total) by mouth 3 (three) times a day with meals, Disp: 10 tablet, Rfl: 0  •  sulfamethoxazole-trimethoprim (BACTRIM DS) 800-160 mg per tablet, Take 1 tablet by mouth every 12 (twelve) hours for 7 days, Disp: 14 tablet, Rfl: 0    Review of Systems   Constitutional: Negative for fever  Gastrointestinal: Positive for nausea  Genitourinary: Positive for difficulty urinating, dysuria, pelvic pain and urgency  Musculoskeletal: Positive for back pain  Objective:    /98 (BP Location: Left arm, Patient Position: Sitting)   Pulse (!) 108   Temp 97 7 °F (36 5 °C) (Tympanic)   Ht 5' 6" (1 676 m)   Wt 66 kg (145 lb 6 4 oz)   SpO2 99%   BMI 23 47 kg/m²      Physical Exam  Vitals reviewed  Abdominal:      General: Abdomen is flat  There is no distension  Palpations: Abdomen is soft  Tenderness: There is abdominal tenderness in the suprapubic area  There is no right CVA tenderness or left CVA tenderness  Neurological:      Mental Status: She is alert           Recent Results (from the past 168 hour(s))   POCT urine dip    Collection Time: 11/09/22  1:05 PM   Result Value Ref Range    LEUKOCYTE ESTERASE,UA 3+     NITRITE,UA +     SL AMB POCT UROBILINOGEN 2+     POCT URINE PROTEIN NEG      PH,UA 6 0     BLOOD,UA +     SPECIFIC GRAVITY,UA 1 010     KETONES,UA NEG     BILIRUBIN,UA 2+     GLUCOSE, UA NEG      COLOR,UA Clear     CLARITY,UA Orange

## 2022-11-09 NOTE — ASSESSMENT & PLAN NOTE
-recurrent symptoms  Initial episode 9/28 showing E coli, pansensitive    Completed macrobid which she reports does not work for her  -will send urine for culture  -start Bactrim DS bid in the meantime and continue pyridium

## 2022-11-10 LAB — BACTERIA UR CULT: NORMAL

## 2022-11-30 ENCOUNTER — ANNUAL EXAM (OUTPATIENT)
Dept: OBGYN CLINIC | Facility: CLINIC | Age: 47
End: 2022-11-30

## 2022-11-30 VITALS
DIASTOLIC BLOOD PRESSURE: 90 MMHG | HEIGHT: 66 IN | BODY MASS INDEX: 23.14 KG/M2 | WEIGHT: 144 LBS | SYSTOLIC BLOOD PRESSURE: 144 MMHG

## 2022-11-30 DIAGNOSIS — R92.2 DENSE BREAST TISSUE ON MAMMOGRAM: ICD-10-CM

## 2022-11-30 DIAGNOSIS — Z12.31 ENCOUNTER FOR SCREENING MAMMOGRAM FOR MALIGNANT NEOPLASM OF BREAST: ICD-10-CM

## 2022-11-30 DIAGNOSIS — Z01.419 ENCOUNTER FOR ANNUAL ROUTINE GYNECOLOGICAL EXAMINATION: Primary | ICD-10-CM

## 2022-11-30 NOTE — PROGRESS NOTES
Assessment/Plan:   Pap smear done as well as annual   Offered STD screening, declines  Encouraged self-breast examination as well as calcium supplementation  Continue annual mammogram   Reviewed automated breast ultrasound given breast density  She is inquiring about breast MRI  All questions answered  Ultimately, decided to await next screening mammogram   Reviewed colon cancer screening, discussed with primary care  Discussed contraception, She will continue to use condoms  Reviewed signs and symptoms of perimenopause  She will keep a menstrual diary  She will continue to [facility] as scheduled  Return to office in 1 year or p r n  No problem-specific Assessment & Plan notes found for this encounter  Diagnoses and all orders for this visit:    Encounter for annual routine gynecological examination    Encounter for screening mammogram for malignant neoplasm of breast  -     Mammo screening bilateral w 3d & cad; Future    Dense breast tissue on mammogram          Subjective:      Patient ID: Iam Verdugo is a 49 y o  female  HPI     This is a 44-year-old female  ( x2, age 14, 5) presents for her gyn exam   Last gyn exam was over 2 years ago  Her menstrual cycles are approximately every 4-6 weeks lasting 3-6 days with no breakthrough bleeding  She does get episodes of hot flashes and night sweats, more so when she is late for her menstrual cycle  She denies any changes in bowel or bladder function  She has had 1 sexual partner over the last 2 years  They do use condoms regularly  She does follow up with her family physician on a regular basis  There was discussion on screening colonoscopy, declines till age 47  Past surgical history includes laparotomy, right ovarian cystectomy at age 22 for large ovarian benign cyst   Diagnostic laparoscopy right ovarian cystectomy with lysis of adhesions in        The following portions of the patient's history were reviewed and updated as appropriate: allergies, current medications, past family history, past medical history, past social history, past surgical history and problem list     Review of Systems   Constitutional: Negative for fatigue, fever and unexpected weight change  Respiratory: Negative for cough, chest tightness, shortness of breath and wheezing  Cardiovascular: Negative  Negative for chest pain and palpitations  Gastrointestinal: Negative  Negative for abdominal distention, abdominal pain, blood in stool, constipation, diarrhea, nausea and vomiting  Genitourinary: Negative  Negative for difficulty urinating, dyspareunia, dysuria, flank pain, frequency, genital sores, hematuria, pelvic pain, urgency, vaginal bleeding, vaginal discharge and vaginal pain  Skin: Negative for rash  Objective:      /90   Ht 5' 6" (1 676 m)   Wt 65 3 kg (144 lb)   LMP 10/17/2022   BMI 23 24 kg/m²          Physical Exam  Constitutional:       Appearance: Normal appearance  She is well-developed and well-nourished  Cardiovascular:      Rate and Rhythm: Normal rate and regular rhythm  Pulmonary:      Effort: Pulmonary effort is normal       Breath sounds: Normal breath sounds  Chest:   Breasts:     Right: No inverted nipple, mass, nipple discharge, skin change or tenderness  Left: No inverted nipple, mass, nipple discharge, skin change or tenderness  Abdominal:      General: Bowel sounds are normal  There is no distension  Palpations: Abdomen is soft  Tenderness: There is no abdominal tenderness  There is no guarding or rebound  Genitourinary:     Labia:         Right: No rash, tenderness or lesion  Left: No rash, tenderness or lesion  Vagina: Normal  No vaginal discharge or tenderness  Cervix: No cervical motion tenderness, discharge, friability, lesion or cervical bleeding  Uterus: Not enlarged and not tender         Adnexa:         Right: No mass, tenderness or fullness  Left: No mass, tenderness or fullness  Neurological:      Mental Status: She is alert and oriented to person, place, and time

## 2022-12-01 LAB
HPV HR 12 DNA CVX QL NAA+PROBE: NEGATIVE
HPV16 DNA CVX QL NAA+PROBE: NEGATIVE
HPV18 DNA CVX QL NAA+PROBE: NEGATIVE

## 2022-12-06 LAB
LAB AP GYN PRIMARY INTERPRETATION: NORMAL
LAB AP LMP: NORMAL
Lab: NORMAL

## 2023-01-08 PROBLEM — N30.01 ACUTE CYSTITIS WITH HEMATURIA: Status: RESOLVED | Noted: 2022-11-09 | Resolved: 2023-01-08

## 2023-07-28 ENCOUNTER — APPOINTMENT (OUTPATIENT)
Dept: LAB | Facility: HOSPITAL | Age: 48
End: 2023-07-28

## 2023-07-28 DIAGNOSIS — Z00.8 HEALTH EXAMINATION IN POPULATION SURVEY: ICD-10-CM

## 2023-07-28 LAB
CHOLEST SERPL-MCNC: 266 MG/DL
EST. AVERAGE GLUCOSE BLD GHB EST-MCNC: 103 MG/DL
HBA1C MFR BLD: 5.2 %
HDLC SERPL-MCNC: 88 MG/DL
LDLC SERPL CALC-MCNC: 163 MG/DL (ref 0–100)
NONHDLC SERPL-MCNC: 178 MG/DL
TRIGL SERPL-MCNC: 74 MG/DL

## 2023-07-28 PROCEDURE — 83036 HEMOGLOBIN GLYCOSYLATED A1C: CPT

## 2023-07-28 PROCEDURE — 80061 LIPID PANEL: CPT

## 2023-07-28 PROCEDURE — 36415 COLL VENOUS BLD VENIPUNCTURE: CPT

## 2023-10-18 ENCOUNTER — OFFICE VISIT (OUTPATIENT)
Dept: INTERNAL MEDICINE CLINIC | Facility: CLINIC | Age: 48
End: 2023-10-18
Payer: COMMERCIAL

## 2023-10-18 VITALS
TEMPERATURE: 97.6 F | DIASTOLIC BLOOD PRESSURE: 80 MMHG | HEIGHT: 66 IN | WEIGHT: 164 LBS | OXYGEN SATURATION: 100 % | SYSTOLIC BLOOD PRESSURE: 128 MMHG | RESPIRATION RATE: 16 BRPM | BODY MASS INDEX: 26.36 KG/M2 | HEART RATE: 89 BPM

## 2023-10-18 DIAGNOSIS — J30.1 SEASONAL ALLERGIC RHINITIS DUE TO POLLEN: ICD-10-CM

## 2023-10-18 DIAGNOSIS — R42 VERTIGO: ICD-10-CM

## 2023-10-18 DIAGNOSIS — Z00.00 ANNUAL PHYSICAL EXAM: Primary | ICD-10-CM

## 2023-10-18 PROCEDURE — 99396 PREV VISIT EST AGE 40-64: CPT | Performed by: INTERNAL MEDICINE

## 2023-10-18 RX ORDER — MECLIZINE HCL 12.5 MG/1
12.5 TABLET ORAL 3 TIMES DAILY PRN
Qty: 30 TABLET | Refills: 0 | Status: SHIPPED | OUTPATIENT
Start: 2023-10-18

## 2023-10-18 RX ORDER — LORATADINE 10 MG/1
TABLET ORAL
COMMUNITY
Start: 2023-08-01

## 2023-10-18 RX ORDER — TRIAMCINOLONE ACETONIDE 55 UG/1
SPRAY, METERED NASAL
COMMUNITY
Start: 2023-08-01

## 2023-10-18 NOTE — PROGRESS NOTES
ADULT ANNUAL [facility] INTERNAL MEDICINE    NAME: Rito Villasenor  AGE: 51 y.o. SEX: female  : 1975     DATE: 10/18/2023     Assessment and Plan:     Problem List Items Addressed This Visit     Seasonal allergic rhinitis     -add azelastine nasal spray in PM, continue nasocort nasal spray in AM and claritin daily         Vertigo    Relevant Medications    meclizine (ANTIVERT) 12.5 MG tablet    Other Relevant Orders    Ambulatory Referral to Physical Therapy   Other Visit Diagnoses     Annual physical exam    -  Primary            Immunizations and preventive care screenings were discussed with patient today. Appropriate education was printed on patient's after visit summary. Counseling:  Alcohol/drug use: discussed moderation in alcohol intake, the recommendations for healthy alcohol use, and avoidance of illicit drug use. Dental Health: discussed importance of regular tooth brushing, flossing, and dental visits. Exercise: the importance of regular exercise/physical activity was discussed. Recommend exercise 3-5 times per week for at least 30 minutes. Immunizations discussed. Influenza vaccine received at [facility] 10/6/23. Recommend updated COVID vaccine, tdap. Cancer screenings discussed. Declines colorectal cancer screen. Mammo is up to date. PAP per GYN. BMI Counseling: Body mass index is 26.47 kg/m². The BMI is above normal. Nutrition recommendations include decreasing portion sizes, consuming healthier snacks, limiting drinks that contain sugar, moderation in carbohydrate intake and reducing intake of cholesterol. Exercise recommendations include moderate physical activity 150 minutes/week, exercising 3-5 times per week and strength training exercises. No pharmacotherapy was ordered. Rationale for BMI follow-up plan is due to patient being overweight or obese.          Return in about 1 year (around 10/18/2024) for Annual physical.     Chief Complaint:     Chief Complaint   Patient presents with   • Physical Exam      History of Present Illness:     Adult Annual Physical   Patient with migraines, allergic rhinitis and chronic back pain here for a comprehensive physical exam.     She discontinued claritin-D and BP has lowered. Now taking nasocort nasal spray and claritin. Diet and Physical Activity  Diet/Nutrition: low fat diet. Does not snack. Exercise: no formal exercise. Depression Screening  PHQ-2/9 Depression Screening    Little interest or pleasure in doing things: 0 - not at all  Feeling down, depressed, or hopeless: 0 - not at all  PHQ-2 Score: 0  PHQ-2 Interpretation: Negative depression screen       General Health  Sleep: sleeps poorly. Hearing: normal - none . Vision: most recent eye exam <1 year ago. Dental: regular dental visits. /GYN Health  Patient is: perimenopausal, getting hot flushes. Last menstrual period: early Spring. Contraceptive method:  none . Advanced Care Planning  Do you have an advanced directive? no  Do you have a durable medical power of ? no     Review of Systems:     Review of Systems   Constitutional:  Positive for unexpected weight change (weight gain). Negative for activity change and appetite change. HENT:  Positive for postnasal drip and rhinorrhea. Negative for hearing loss. Respiratory:  Negative for cough, chest tightness, shortness of breath and wheezing. Cardiovascular:  Negative for chest pain, palpitations and leg swelling. Gastrointestinal:  Negative for abdominal pain, blood in stool, constipation, diarrhea, nausea and vomiting. Genitourinary:  Negative for difficulty urinating. Musculoskeletal:  Positive for arthralgias, back pain, myalgias and neck stiffness. Neurological:  Positive for dizziness, numbness and headaches. Psychiatric/Behavioral:  Positive for sleep disturbance.        Past Medical History:     Past Medical History: Diagnosis Date   • COVID-19 05/2022   • Finger fracture, left    • Intrinsic muscle tightness 01/16/2019   • Pure hypercholesterolemia 10/14/2021   • Trigger middle finger of left hand 01/16/2019   • UTI symptoms 10/14/2021   • Vitamin D deficiency       Past Surgical History:     Past Surgical History:   Procedure Laterality Date   • LAPAROSCOPIC OVARIAN CYSTECTOMY Right 2001    Lysis of adhesions, Dr. Narcisa Chavira   • [address]    open, Dr. Tara Harry   • TONSILECTOMY AND ADNOIDECTOMY     • TONSILLECTOMY     • WISDOM TOOTH EXTRACTION        Social History:     Social History     Socioeconomic History   • Marital status: Single     Spouse name: None   • Number of children: None   • Years of education: None   • Highest education level: None   Occupational History   • None   Tobacco Use   • Smoking status: Never   • Smokeless tobacco: Never   Vaping Use   • Vaping Use: Never used   Substance and Sexual Activity   • Alcohol use:  Yes     Alcohol/week: 2.0 standard drinks of alcohol     Types: 2 Glasses of wine per week     Comment: occasional   • Drug use: Never   • Sexual activity: Yes     Partners: Male     Birth control/protection: None, Male Sterilization   Other Topics Concern   • None   Social History Narrative    ** Merged History Encounter ** Lives with her 2 daughters    Works full time    Exercise: 4 days per week    Diet: balanced    Caffeine: 1-2 servings coffee daily        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[place] [address] Determinants of Health     Financial Resource Strain: Not on file   Food Insecurity: Not on file   Transportation Needs: Not on file   Physical Activity: Not on file   Stress: Not on file   Social Connections: Not on file   Intimate Partner Violence: Not on file   Housing Stability: Not on file      Family History:     Family History   Problem Relation Age of Onset   • Lung cancer Mother 67        non small cell   • No Known Problems Father    • No Known Problems Sister    • No Known Problems Sister    • No Known Problems Brother    • No Known Problems Daughter    • No Known Problems Daughter    • Hyperlipidemia Maternal Grandmother    • Thyroid disease Maternal Grandmother    • Lung cancer Maternal Grandmother 61   • Coronary artery disease Maternal Grandfather    • Hyperlipidemia Maternal Grandfather    • Alcohol abuse Maternal Grandfather    • Parkinsonism Maternal Grandfather    • Heart disease Paternal Grandmother    • Heart disease Paternal Grandfather    • No Known Problems Maternal Aunt    • No Known Problems Paternal Aunt    • No Known Problems Paternal Uncle       Current Medications:     Current Outpatient Medications   Medication Sig Dispense Refill   • butalbital-acetaminophen-caffeine (FIORICET,ESGIC) -40 mg per tablet Take 1 tablet by mouth every 6 (six) hours as needed for headaches 15 tablet 0   • Cholecalciferol (VITAMIN D3 PO) Take by mouth     • loratadine (Claritin) 10 mg tablet      • meclizine (ANTIVERT) 12.5 MG tablet Take 1 tablet (12.5 mg total) by mouth 3 (three) times a day as needed for dizziness 30 tablet 0   • Triamcinolone Acetonide (Nasacort Allergy 24HR) 55 MCG/ACT nasal spray        No current facility-administered medications for this visit. Allergies:     No Known Allergies   Physical Exam:     /80 (BP Location: Right arm, Patient Position: Sitting, Cuff Size: Standard)   Pulse 89   Temp 97.6 °F (36.4 °C) (Tympanic Core)   Resp 16   Ht 5' 6" (1.676 m)   Wt 74.4 kg (164 lb)   SpO2 100%   BMI 26.47 kg/m²     Physical Exam  Vitals reviewed. Constitutional:       General: She is not in acute distress. Appearance: She is obese. HENT:      Head: Normocephalic. Right Ear: Tympanic membrane and ear canal normal.      Left Ear: Tympanic membrane and ear canal normal.      Nose: Congestion (R nasal turbinate hypertrophy) present.       Mouth/Throat:      Mouth: Mucous membranes are moist.   Eyes:      Extraocular Movements: Extraocular movements intact. Conjunctiva/sclera: Conjunctivae normal.      Pupils: Pupils are equal, round, and reactive to light. Neck:      Vascular: No carotid bruit. Cardiovascular:      Rate and Rhythm: Normal rate and regular rhythm. Pulses: Normal pulses. Heart sounds: Normal heart sounds. Pulmonary:      Effort: Pulmonary effort is normal. No respiratory distress. Breath sounds: Normal breath sounds. No wheezing. Chest:      Comments: Breast exam deferred by patient  Abdominal:      General: Bowel sounds are normal. There is no distension. Palpations: Abdomen is soft. Tenderness: There is no abdominal tenderness. Musculoskeletal:      Cervical back: Neck supple. No tenderness. Right lower leg: No edema. Left lower leg: No edema. Lymphadenopathy:      Cervical: No cervical adenopathy. Skin:     Coloration: Skin is not pale. Neurological:      General: No focal deficit present. Mental Status: She is alert and oriented to person, place, and time. Psychiatric:         Mood and Affect: Mood normal.          Recent Results (from the past 2352 hour(s))   Hemoglobin A1C    Collection Time: 07/28/23  2:48 PM   Result Value Ref Range    Hemoglobin A1C 5.2 Normal 4.0-5.6%; PreDiabetic 5.7-6.4%;  Diabetic >=6.5%; Glycemic control for adults with diabetes <7.0% %     mg/dl   Lipid panel    Collection Time: 07/28/23  2:48 PM   Result Value Ref Range    Cholesterol 266 (H) See Comment mg/dL    Triglycerides 74 See Comment mg/dL    HDL, Direct 88 >=50 mg/dL    LDL Calculated 163 (H) 0 - 100 mg/dL    Non-HDL-Chol (CHOL-HDL) 178 mg/dl     The 10-year ASCVD risk score (Chris ELLIOTT, et al., 2019) is: 0.9%    Values used to calculate the score:      Age: 51 years      Sex: Female      Is Non- : No      Diabetic: No      Tobacco smoker: No      Systolic Blood Pressure: 988 mmHg      Is BP treated: No      HDL Cholesterol: 88 mg/dL Total Cholesterol: 266 mg/dL    Varun Lopes, DO  [address]

## 2023-10-18 NOTE — PATIENT INSTRUCTIONS
Wellness Visit for Adults   AMBULATORY CARE:   A wellness visit  is when you see your healthcare provider to get screened for health problems. Your healthcare provider will also give you advice on how to stay healthy. Write down your questions so you remember to ask them. Ask your healthcare provider how often you should have a wellness visit. What happens at a wellness visit:  Your healthcare provider will ask about your health, and your family history of health problems. This includes high blood pressure, heart disease, and cancer. He or she will ask if you have symptoms that concern you, if you smoke, and about your mood. You may also be asked about your intake of medicines, supplements, food, and alcohol. Any of the following may be done: Your weight  will be checked. Your height may also be checked so your body mass index (BMI) can be calculated. Your BMI shows if you are at a healthy weight. Your blood pressure  and heart rate will be checked. Your temperature may also be checked. Blood and urine tests  may be done. Blood tests may be done to check your cholesterol levels. Abnormal cholesterol levels increase your risk for heart disease and stroke. You may also need a blood or urine test to check for diabetes if you are at increased risk. Urine tests may be done to look for signs of an infection or kidney disease. A physical exam  includes checking your heartbeat and lungs with a stethoscope. Your healthcare provider may also check your skin to look for sun damage. Screening tests  may be recommended. A screening test is done to check for diseases that may not cause symptoms. The screening tests you may need depend on your age, gender, family history, and lifestyle habits. For example, colorectal screening may be recommended if you are 48years old or older. Screening tests you need if you are a woman:   A Pap smear  is used to screen for cervical cancer.  Pap smears are usually done every 3 to 5 years depending on your age. You may need them more often if you have had abnormal Pap smear test results in the past. Ask your healthcare provider how often you should have a Pap smear. A mammogram  is an x-ray of your breasts to screen for breast cancer. Experts recommend mammograms every 2 years starting at age 48 years. You may need a mammogram at age 52 years or younger if you have an increased risk for breast cancer. Talk to your healthcare provider about when you should start having mammograms and how often you need them. Vaccines you may need:   Get an influenza vaccine  every year. The influenza vaccine protects you from the flu. Several types of viruses cause the flu. The viruses change over time, so new vaccines are made each year. Get a tetanus-diphtheria (Td) booster vaccine  every 10 years. This vaccine protects you against tetanus and diphtheria. Tetanus is a severe infection that may cause painful muscle spasms and lockjaw. Diphtheria is a severe bacterial infection that causes a thick covering in the back of your mouth and throat. Get a human papillomavirus (HPV) vaccine  if you are female and aged 23 to 32 or male 23 to 24 and never received it. This vaccine protects you from HPV infection. HPV is the most common infection spread by sexual contact. HPV may also cause vaginal, penile, and anal cancers. Get a pneumococcal vaccine  if you are aged 72 years or older. The pneumococcal vaccine is an injection given to protect you from pneumococcal disease. Pneumococcal disease is an infection caused by pneumococcal bacteria. The infection may cause pneumonia, meningitis, or an ear infection. Get a shingles vaccine  if you are 60 or older, even if you have had shingles before. The shingles vaccine is an injection to protect you from the varicella-zoster virus. This is the same virus that causes chickenpox.  Shingles is a painful rash that develops in people who had chickenpox or have been exposed to the virus. How to eat healthy:  My Plate is a model for planning healthy meals. It shows the types and amounts of foods that should go on your plate. Fruits and vegetables make up about half of your plate, and grains and protein make up the other half. A serving of dairy is included on the side of your plate. The amount of calories and serving sizes you need depends on your age, gender, weight, and height. Examples of healthy foods are listed below:  Eat a variety of vegetables  such as dark green, red, and orange vegetables. You can also include canned vegetables low in sodium (salt) and frozen vegetables without added butter or sauces. Eat a variety of fresh fruits , canned fruit in 100% juice, frozen fruit, and dried fruit. Include whole grains. At least half of the grains you eat should be whole grains. Examples include whole-wheat bread, wheat pasta, brown rice, and whole-grain cereals such as oatmeal.    Eat a variety of protein foods such as seafood (fish and shellfish), lean meat, and poultry without skin (turkey and chicken). Examples of lean meats include pork leg, shoulder, or tenderloin, and beef round, sirloin, tenderloin, and extra lean ground beef. Other protein foods include eggs and egg substitutes, beans, peas, soy products, nuts, and seeds. Choose low-fat dairy products such as skim or 1% milk or low-fat yogurt, cheese, and cottage cheese. Limit unhealthy fats  such as butter, hard margarine, and shortening. Exercise:  Exercise at least 30 minutes per day on most days of the week. Some examples of exercise include walking, biking, dancing, and swimming. You can also fit in more physical activity by taking the stairs instead of the elevator or parking farther away from stores. Include muscle strengthening activities 2 days each week. Regular exercise provides many health benefits.  It helps you manage your weight, and decreases your risk for type 2 diabetes, heart disease, stroke, and high blood pressure. Exercise can also help improve your mood. Ask your healthcare provider about the best exercise plan for you. General health and safety guidelines:   Do not smoke. Nicotine and other chemicals in cigarettes and cigars can cause lung damage. Ask your healthcare provider for information if you currently smoke and need help to quit. E-cigarettes or smokeless tobacco still contain nicotine. Talk to your healthcare provider before you use these products. Limit alcohol. A drink of alcohol is 12 ounces of beer, 5 ounces of wine, or 1½ ounces of liquor. Lose weight, if needed. Being overweight increases your risk of certain health conditions. These include heart disease, high blood pressure, type 2 diabetes, and certain types of cancer. Protect your skin. Do not sunbathe or use tanning beds. Use sunscreen with a SPF 15 or higher. Apply sunscreen at least 15 minutes before you go outside. Reapply sunscreen every 2 hours. Wear protective clothing, hats, and sunglasses when you are outside. Drive safely. Always wear your seatbelt. Make sure everyone in your car wears a seatbelt. A seatbelt can save your life if you are in an accident. Do not use your cell phone when you are driving. This could distract you and cause an accident. Pull over if you need to make a call or send a text message. Practice safe sex. Use latex condoms if are sexually active and have more than one partner. Your healthcare provider may recommend screening tests for sexually transmitted infections (STIs). Wear helmets, lifejackets, and protective gear. Always wear a helmet when you ride a bike or motorcycle, go skiing, or play sports that could cause a head injury. Wear protective equipment when you play sports. Wear a lifejacket when you are on a boat or doing water sports.     © Copyright [facility] 2023 Information is for End User's use only and may not be sold, redistributed or otherwise used for commercial purposes. The above information is an  only. It is not intended as medical advice for individual conditions or treatments. Talk to your doctor, nurse or pharmacist before following any medical regimen to see if it is safe and effective for you. Cholesterol and Your Health   AMBULATORY CARE:   Cholesterol  is a waxy, fat-like substance. Your body uses cholesterol to make hormones and new cells, and to protect nerves. Cholesterol is made by your body. It also comes from certain foods you eat, such as meat and dairy products. Your healthcare provider can help you set goals for your cholesterol levels. Your provider can help you create a plan to meet your goals. Cholesterol level goals: Your cholesterol level goals depend on your risk for heart disease, your age, and your other health conditions. The following are general guidelines: Total cholesterol  includes low-density lipoprotein (LDL), high-density lipoprotein (HDL), and triglyceride levels. The total cholesterol level should be lower than 200 mg/dL and is best at about 150 mg/dL. LDL cholesterol  is called bad cholesterol  because it forms plaque in your arteries. As plaque builds up, your arteries become narrow, and less blood flows through. When plaque decreases blood flow to your heart, you may have chest pain. If plaque completely blocks an artery that brings blood to your heart, you may have a heart attack. Plaque can break off and form blood clots. Blood clots may block arteries in your brain and cause a stroke. The level should be less than 130 mg/dL and is best at about 100 mg/dL. HDL cholesterol  is called good cholesterol  because it helps remove LDL cholesterol from your arteries. It does this by attaching to LDL cholesterol and carrying it to your liver. Your liver breaks down LDL cholesterol so your body can get rid of it.  High levels of HDL cholesterol can help prevent a heart attack and stroke. Low levels of HDL cholesterol can increase your risk for heart disease, heart attack, and stroke. The level should be at least 40 mg/dL in males or at least 50 mg/dL in females. Triglycerides  are a type of fat that store energy from foods you eat. High levels of triglycerides also cause plaque buildup. This can increase your risk for a heart attack or stroke. If your triglyceride level is high, your LDL cholesterol level may also be high. The level should be less than 150 mg/dL. Any of the following can increase your risk for high cholesterol:   Smoking or drinking large amounts of alcohol    Having overweight or obesity, or not getting enough exercise    A medical condition such as hypertension (high blood pressure) or diabetes    A family history of high cholesterol    Age older than 72    What you need to know about having your cholesterol levels checked: Adults 21to 39years of age should have their cholesterol levels checked every 4 to 6 years. Adults 45 years or older should have their cholesterol checked every 1 to 2 years. You may need your cholesterol checked more often, or at a younger age, if you have risk factors for heart disease. You may also need to have your cholesterol checked more often if you have other health conditions, such as diabetes. Blood tests are used to check cholesterol levels. Blood tests measure your levels of triglycerides, LDL cholesterol, and HDL cholesterol. How healthy fats affect your cholesterol levels:  Healthy fats, also called unsaturated fats, help lower LDL cholesterol and triglyceride levels. Healthy fats include the following:  Monounsaturated fats  are found in foods such as olive oil, canola oil, avocado, nuts, and olives. Polyunsaturated fats,  such as omega 3 fats, are found in fish, such as salmon, trout, and tuna. They can also be found in plant foods such as flaxseed, walnuts, and soybeans.     How unhealthy fats affect your cholesterol levels: Unhealthy fats increase LDL cholesterol and triglyceride levels. They are found in foods high in cholesterol, saturated fat, and trans fat:  Cholesterol  is found in eggs, dairy, and meat. Saturated fat  is found in butter, cheese, ice cream, whole milk, and coconut oil. Saturated fat is also found in meat, such as sausage, hot dogs, and bologna. Trans fat  is found in liquid oils and is used in fried and baked foods. Foods that contain trans fats include chips, crackers, muffins, sweet rolls, microwave popcorn, and cookies. Treatment  for high cholesterol will also decrease your risk of heart disease, heart attack, and stroke. Treatment may include any of the following:  Lifestyle changes  may include food, exercise, weight loss, and quitting smoking. You may also need to decrease the amount of alcohol you drink. Your healthcare provider will want you to start with lifestyle changes. Other treatment may be added if lifestyle changes are not enough. Your healthcare provider may recommend you work with a team to manage hyperlipidemia. The team may include medical experts such as a dietitian, an exercise or physical therapist, and a behavior therapist. Your family members may be included in helping you create lifestyle changes. Medicines  may be given to lower your LDL cholesterol, triglyceride levels, or total cholesterol level. You may need medicines to lower your cholesterol if any of the following is true:    You have a history of stroke, TIA, unstable angina, or a heart attack. Your LDL cholesterol level is 190 mg/dL or higher. You are age 36 to 76 years, have diabetes or heart disease risk factors, and your LDL cholesterol is 70 mg/dL or higher. Supplements  include fish oil, red yeast rice, and garlic. Fish oil may help lower your triglyceride and LDL cholesterol levels. It may also increase your HDL cholesterol level.  Red yeast rice may help decrease your total cholesterol level and LDL cholesterol level. Garlic may help lower your total cholesterol level. Do not take any supplements without talking to your healthcare provider. Food changes you can make to lower your cholesterol levels:  A dietitian can help you create a healthy eating plan. Your dietitian can show you how to read food labels and choose foods low in saturated fat, trans fats, and cholesterol. Decrease the total amount of fat you eat. Choose lean meats, fat-free or 1% fat milk, and low-fat dairy products, such as yogurt and cheese. Try to limit or avoid red meats. Limit or do not eat fried foods or baked goods, such as cookies. Replace unhealthy fats with healthy fats. Cook foods in olive oil or canola oil. Choose soft margarines that are low in saturated fat and trans fat. Seeds, nuts, and avocados are other examples of healthy fats. Eat foods with omega-3 fats. Examples include salmon, tuna, mackerel, walnuts, and flaxseed. Eat fish 2 times per week. Pregnant women should not eat fish that have high levels of mercury, such as shark, swordfish, and sneha mackerel. Increase the amount of high-fiber foods you eat. High-fiber foods can help lower your LDL cholesterol. Aim to get between 20 and 30 grams of fiber each day. Fruits and vegetables are high in fiber. Eat at least 5 servings each day. Other high-fiber foods are whole-grain or whole-wheat breads, pastas, or cereals, and brown rice. Eat 3 ounces of whole-grain foods each day. Increase fiber slowly. You may have abdominal discomfort, bloating, and gas if you add fiber to your diet too quickly. Eat healthy protein foods. Examples include low-fat dairy products, skinless chicken and turkey, fish, and nuts. Limit foods and drinks that are high in sugar. Your dietitian or healthcare provider can help you create daily limits for high-sugar foods and drinks. The limit may be lower if you have diabetes or another health condition.  Limits can also help you lose weight if needed. Lifestyle changes you can make to lower your cholesterol levels:   Maintain a healthy weight. Ask your healthcare provider what a healthy weight is for you. Ask your provider to help you create a weight loss plan if needed. Weight loss can decrease your total cholesterol and triglyceride levels. Weight loss may also help keep your blood pressure at a healthy level. Be physically active throughout the day. Physical activity, such as exercise, can help lower your total cholesterol level and maintain a healthy weight. Physical activity can also help increase your HDL cholesterol level. Work with your healthcare provider to create an program that is right for you. Get at least 30 to 40 minutes of moderate physical activity most days of the week. Examples of exercise include brisk walking, swimming, or biking. Also include strength training at least 2 times each week. Your healthcare providers can help you create a physical activity plan. Do not smoke. Nicotine and other chemicals in cigarettes and cigars can raise your cholesterol levels. Ask your healthcare provider for information if you currently smoke and need help to quit. E-cigarettes or smokeless tobacco still contain nicotine. Talk to your healthcare provider before you use these products. Limit or do not drink alcohol. Alcohol can increase your triglyceride levels. Ask your healthcare provider before you drink alcohol. Ask how much is okay for you to drink in 24 hours or 1 week. Follow up with your doctor as directed:  Write down your questions so you remember to ask them during your visits. © Copyright [employer] 2023 Information is for End User's use only and may not be sold, redistributed or otherwise used for commercial purposes. The above information is an  only. It is not intended as medical advice for individual conditions or treatments.  Talk to your doctor, nurse or pharmacist before following any medical regimen to see if it is safe and effective for you.

## 2023-10-25 ENCOUNTER — EVALUATION (OUTPATIENT)
Dept: PHYSICAL THERAPY | Age: 48
End: 2023-10-25
Payer: COMMERCIAL

## 2023-10-25 VITALS — DIASTOLIC BLOOD PRESSURE: 85 MMHG | HEART RATE: 85 BPM | SYSTOLIC BLOOD PRESSURE: 119 MMHG

## 2023-10-25 DIAGNOSIS — R26.89 IMBALANCE: ICD-10-CM

## 2023-10-25 DIAGNOSIS — R26.9 ABNORMALITY OF GAIT: ICD-10-CM

## 2023-10-25 DIAGNOSIS — R42 VERTIGO: Primary | ICD-10-CM

## 2023-10-25 PROCEDURE — 97162 PT EVAL MOD COMPLEX 30 MIN: CPT

## 2023-10-25 PROCEDURE — 97110 THERAPEUTIC EXERCISES: CPT

## 2023-10-26 NOTE — PROGRESS NOTES
PT Evaluation     Today's date: 10/25/2023  Patient name: Xi Delatorre  : 1975  MRN: [id number]  Referring provider: Shaniqua Brooks DO  Dx:   Encounter Diagnosis     ICD-10-CM    1. Vertigo  R42 Ambulatory Referral to Physical Therapy      2. Abnormality of gait  R26.9       3. Imbalance  R26.89                      Assessment/Plan    Subjective Evaluation    History of Present Illness  Onset date: onset of vertigo 2 weeks ago with c/o back then neck  pain thenn dizziness.           Recurrent probem    Quality of life: fair    Pain  Current pain ratin  At best pain ratin  At worst pain rating: 10 (states over a 10)  Location: neck pain , back pain mid back and neck pain, chronic  Quality: dull ache, sharp, radiating, tight, pulling and pressure  Relieving factors: ice, heat, change in position and rest  Aggravating factors: overhead activity and lifting (supine lying)  Progression: no change      Objective           Precautions:no known allergies, chronic neck and back pain     Decreased cervical stabilization noted   Manuals 10/25,             Dixhallpike testing  I eval  poor tolerance on right c/o increased dizziness greater than left no nystagmus noted r or left            Biode[employer] balance testing and training             Graston c spine              Snags [facility]             Neuro Re-Ed             Chin tucks  5 reps 5 sec hold             Cervical isometrics              Tandem gait 1 min x 1            Tandem stance 30 sec x 3            Head diagonal exer  Demonstrated pt may try at home at later time            Chin tuck with sidelying to sit  X 4                         Ther Ex                                                                                                                     Ther Activity                                       Gait Training                                       Modalities

## 2023-11-01 ENCOUNTER — OFFICE VISIT (OUTPATIENT)
Dept: PHYSICAL THERAPY | Age: 48
End: 2023-11-01
Payer: COMMERCIAL

## 2023-11-01 DIAGNOSIS — R42 VERTIGO: Primary | ICD-10-CM

## 2023-11-01 PROCEDURE — 97140 MANUAL THERAPY 1/> REGIONS: CPT

## 2023-11-01 PROCEDURE — 97110 THERAPEUTIC EXERCISES: CPT

## 2023-11-01 PROCEDURE — 97750 PHYSICAL PERFORMANCE TEST: CPT

## 2023-11-01 NOTE — PROGRESS NOTES
Daily Note     Today's date: 2023  Patient name: Ric Young  : 1975  MRN: [id number]  Referring provider: Richi Wilson DO  Dx:   Encounter Diagnosis     ICD-10-CM    1. Vertigo  R42                      Subjective: "no one has been able to help me."      Objective: See treatment diary below      Assessment: Tolerated treatment well. Patient encouraged by PT to perform mulligan snags at home 3 times a day 3 reps 6 oscillations at a time right and left le. Plan: Continue per plan of care.       Precautions:no known allergies, chronic neck and back pain     Decreased cervical stabilization noted   Manuals  Spring of ribs on left , trial thoracic spring test  10/25, 23  man           Dixhallpike testing  I eval  poor tolerance on right c/o increased dizziness greater than left no nystagmus noted r or left            Biodex balance testing and training  Perf            Graston c spine , direct myofascial release techniques, release of lat, subscapularis , thoracic direct release   Man trigger point release, thoracic spine  and myofascial release           Snags mulligan  3 reps 6 oscillations r/l           Neuro Re-Ed  Cervical mobs rotation and upper t spine            Chin tucks  5 reps 5 sec hold  5 reps            Cervical isometrics   5 rep 5 sec hold           Tandem gait 1 min x 1 1minx 1 fwd, bwd           Tandem stance 30 sec x 3 30 sec x 3  last rep eyes closed            Head diagonal exer  Demonstrated pt may try at home at later time Hold if increases her vertigo            Chin tuck with sidelying to sit  X 4 Being performed                        Ther Ex                                                                                                                     Ther Activity                                         Gait Training                                                                    Modalities

## 2023-11-07 ENCOUNTER — OFFICE VISIT (OUTPATIENT)
Dept: PHYSICAL THERAPY | Age: 48
End: 2023-11-07
Payer: COMMERCIAL

## 2023-11-07 DIAGNOSIS — R26.9 ABNORMALITY OF GAIT: ICD-10-CM

## 2023-11-07 DIAGNOSIS — R42 VERTIGO: Primary | ICD-10-CM

## 2023-11-07 DIAGNOSIS — R26.89 IMBALANCE: ICD-10-CM

## 2023-11-07 PROCEDURE — 97110 THERAPEUTIC EXERCISES: CPT

## 2023-11-07 PROCEDURE — 97140 MANUAL THERAPY 1/> REGIONS: CPT

## 2023-11-07 PROCEDURE — 97530 THERAPEUTIC ACTIVITIES: CPT

## 2023-11-07 NOTE — PROGRESS NOTES
Daily Note     Today's date: 2023  Patient name: Isiah Albarran  : 1975  MRN: [id number]  Referring provider: Beka Simon DO  Dx:   Encounter Diagnosis     ICD-10-CM    1. Vertigo  R42       2. Abnormality of gait  R26.9       3. Imbalance  R26.89                      Subjective: Pt instructed in 1/2 sommersault BPPV maneuver for vertigo to the right today. Objective: See treatment diary below      Assessment: Tolerated treatment well. Patient would benefit from continued PT      Plan: Continue per plan of care.       Precautions:no known allergies, chronic neck and back pain     Decreased cervical stabilization noted   Manuals  Spring of ribs on left , trial thoracic spring test  10/25, 23  man           Dixhallpike testing  I eval  poor tolerance on right c/o increased dizziness greater than left no nystagmus noted r or left  1/2 sommersault BPPV maneuver to self x 3 to right           [employer] balance testing and training  Perf            Graston c spine , direct myofascial release techniques, release of lat, subscapularis , thoracic direct release   Man trigger point release, thoracic spine  and myofascial release Man 30 min   and trigger point release manual c1,2,3 on left          Snags mulligan  3 reps 6 oscillations r/l 3 reps 6 oscillations r/l then left again 3 more reps           Neuro Re-Ed  Cervical mobs rotation and upper t spine             Chin tucks  5 reps 5 sec hold  5 reps  10 reps           Cervical isometrics   5 rep 5 sec hold At home          Tandem gait 1 min x 1 1minx 1 fwd, bwd           Tandem stance 30 sec x 3 30 sec x 3  last rep eyes closed            Head diagonal exer  Demonstrated pt may try at home at later time Hold if increases her vertigo            Chin tuck with sidelying to sit  X 4 Being performed Perf           Ball toss to self  while walking , side to side ball toss with PT /PTA   30 ft x 2          Ther Ex             Watch ball, trunk flex /ext, rot, ccw and cw circles    10 reps each          Hallway ball toss r/l and sidestepping ball in front against wall    100 ft x 2          Trampoline ball toss static , double bounce, jogging   30 reps each                                                                           Ther Activity                                         Gait Training                                                                    Modalities

## 2023-11-09 ENCOUNTER — OFFICE VISIT (OUTPATIENT)
Dept: PHYSICAL THERAPY | Age: 48
End: 2023-11-09
Payer: COMMERCIAL

## 2023-11-09 DIAGNOSIS — R42 VERTIGO: Primary | ICD-10-CM

## 2023-11-09 PROCEDURE — 97113 AQUATIC THERAPY/EXERCISES: CPT

## 2023-11-09 PROCEDURE — 97129 THER IVNTJ 1ST 15 MIN: CPT

## 2023-11-09 NOTE — PROGRESS NOTES
Daily Note     Today's date: 2023  Patient name: Denilson Gregg  : 1975  MRN: [id number]  Referring provider: Virginie Jauregui DO  Dx:   Encounter Diagnosis     ICD-10-CM    1. Vertigo  R42                      Subjective: Pt. Reports slightly less dizziness with cervical mobs. Objective: See treatment diary below      Assessment: Tolerated treatment well. Patient would benefit from continued PT      Plan: Continue per plan of care.       Precautions:no known allergies, chronic neck and back pain     Decreased cervical stabilization noted   Manuals  Spring of ribs on left , trial thoracic spring test  10/25, 23  man          Dixhallpike testing  I eval  poor tolerance on right c/o increased dizziness greater than left no nystagmus noted r or left  1/2 sommersault BPPV maneuver to self x 3 to right           [employer] balance testing and training  Perf            Graston c spine , direct myofascial release techniques, release of lat, subscapularis , thoracic direct release   Man trigger point release, thoracic spine  and myofascial release Man 30 min   and trigger point release manual c1,2,3 on left 15 min         Snags mulligan  3 reps 6 oscillations r/l 3 reps 6 oscillations r/l then left again 3 more reps  3 reps x 6         Neuro Re-Ed  Cervical mobs rotation and upper t spine             Chin tucks  5 reps 5 sec hold  5 reps  10 reps  2 x 10         Cervical isometrics   5 rep 5 sec hold At home 5sec x 5         Tandem gait 1 min x 1 1minx 1 fwd, bwd  1 min         Tandem stance 30 sec x 3 30 sec x 3  last rep eyes closed   20sec x 5         Head diagonal exer  Demonstrated pt may try at home at later time Hold if increases her vertigo            Chin tuck with sidelying to sit  X 4 Being performed Perf           Ball toss to self  while walking , side to side ball toss with PT /PTA   30 ft x 2          Ther Ex             Watch ball, trunk flex /ext, rot, ccw and cw circles 10 reps each          Hallway ball toss r/l and sidestepping ball in front against wall    100 ft x 2 100ft x 4         Trampoline ball toss static , double bounce, jogging   30 reps each                                                                           Ther Activity                                         Gait Training                                                                    Modalities

## 2023-11-14 ENCOUNTER — APPOINTMENT (OUTPATIENT)
Dept: PHYSICAL THERAPY | Age: 48
End: 2023-11-14
Payer: COMMERCIAL

## 2023-11-16 ENCOUNTER — OFFICE VISIT (OUTPATIENT)
Dept: PHYSICAL THERAPY | Age: 48
End: 2023-11-16
Payer: COMMERCIAL

## 2023-11-16 DIAGNOSIS — R26.9 ABNORMALITY OF GAIT: ICD-10-CM

## 2023-11-16 DIAGNOSIS — R42 VERTIGO: Primary | ICD-10-CM

## 2023-11-16 DIAGNOSIS — R26.89 IMBALANCE: ICD-10-CM

## 2023-11-16 PROCEDURE — 97140 MANUAL THERAPY 1/> REGIONS: CPT

## 2023-11-16 PROCEDURE — 97110 THERAPEUTIC EXERCISES: CPT

## 2023-11-16 NOTE — PROGRESS NOTES
Daily Note     Today's date: 2023  Patient name: Mike Ramirez  : 1975  MRN: [id number]  Referring provider: Genoveva Chi DO  Dx:   Encounter Diagnosis     ICD-10-CM    1. Vertigo  R42       2. Abnormality of gait  R26.9       3. Imbalance  R26.89                      Subjective: Pt reporting decreased thoracic muscle guarding and improved spinal mobility, vertigo continues to be present with pt believing it is directly related to her left sided upper neck pain. 1/2 somersault maneuver not changing her vertigo per her report. Report of cervical pain at level 10 today. Objective: See treatment diary below      Assessment: Tolerated treatment well. Patient would benefit from continued PT. PT calling [facility] PT office to discuss possible transfer for [facility] [place] [facility] PT spine specialist in the near future and redirect focus to left upper cervical pain complaints. Strain counterstrain ant approach performed by PT today, prone ap joint mobs c spine and cervical and thoracic direct myofascial release however cervical pain unchanged. Pt reclining bradley hallpike retesting today due to having work tomorrow. Plan: Continue per plan of care.       Precautions:no known allergies, chronic neck and back pain     Decreased cervical stabilization noted   Manuals  Spring of ribs on left , trial thoracic spring test  10/25, 23  man     Man also ap cervic mobs grade 2 pronec 2, c 6        Dixhallpike testing  I eval  poor tolerance on right c/o increased dizziness greater than left no nystagmus noted r or left  1/2 sommersault BPPV maneuver to self x 3 to right           [employer] balance testing and training  Perf            Graston c spine , direct myofascial release techniques, release of lat, subscapularis , thoracic direct release   Man trigger point release, thoracic spine  and myofascial release Man 30 min   and trigger point release manual c1,2,3 on left 15 min         Snags chance  3 reps 6 oscillations r/l 3 reps 6 oscillations r/l then left again 3 more reps  3 reps x 6         Neuro Re-Ed  Cervical mobs rotation and upper t spine             Chin tucks  5 reps 5 sec hold  5 reps  10 reps  2 x 10 10 reps        Cervical isometrics   5 rep 5 sec hold At home 5sec x 5 5 x 5        Tandem gait 1 min x 1 1minx 1 fwd, bwd  1 min         Tandem stance 30 sec x 3 30 sec x 3  last rep eyes closed   20sec x 5         Head diagonal exer  Demonstrated pt may try at home at later time Hold if increases her vertigo            Chin tuck with sidelying to sit  X 4 Being performed Perf   Does decrease c/o vertigo        Ball toss to self  while walking , side to side ball toss with PT /PTA   30 ft x 2          Ther Ex             Watch ball, trunk flex /ext, rot, ccw and cw circles    10 reps each          Hallway ball toss r/l and sidestepping ball in front against wall    100 ft x 2 100ft x 4         Trampoline ball toss static , double bounce, jogging   30 reps each                                                                           Ther Activity     No charge post exer today brief 10 min        MH               C spine                                                                                    Modalities

## 2023-11-20 ENCOUNTER — HOSPITAL ENCOUNTER (OUTPATIENT)
Dept: RADIOLOGY | Age: 48
Discharge: HOME | End: 2023-11-20
Payer: COMMERCIAL

## 2023-11-20 VITALS — BODY MASS INDEX: 26.36 KG/M2 | WEIGHT: 164.02 LBS | HEIGHT: 66 IN

## 2023-11-20 DIAGNOSIS — Z12.31 ENCOUNTER FOR SCREENING MAMMOGRAM FOR MALIGNANT NEOPLASM OF BREAST: ICD-10-CM

## 2023-11-20 DIAGNOSIS — M53.82: Primary | ICD-10-CM

## 2023-11-20 PROCEDURE — 77063 BREAST TOMOSYNTHESIS BI: CPT

## 2023-11-20 PROCEDURE — 77067 SCR MAMMO BI INCL CAD: CPT

## 2023-11-21 ENCOUNTER — OFFICE VISIT (OUTPATIENT)
Dept: PHYSICAL THERAPY | Age: 48
End: 2023-11-21
Payer: COMMERCIAL

## 2023-11-21 DIAGNOSIS — R42 VERTIGO: Primary | ICD-10-CM

## 2023-11-21 DIAGNOSIS — R26.9 ABNORMALITY OF GAIT: ICD-10-CM

## 2023-11-21 PROCEDURE — 97110 THERAPEUTIC EXERCISES: CPT

## 2023-11-21 PROCEDURE — 97140 MANUAL THERAPY 1/> REGIONS: CPT

## 2023-11-21 PROCEDURE — 97112 NEUROMUSCULAR REEDUCATION: CPT

## 2023-11-21 NOTE — PROGRESS NOTES
Daily Note     Today's date: 2023  Patient name: Alberto Gomez  : 1975  MRN: [id number]  Referring provider: Argelia Harden DO  Dx:   Encounter Diagnosis     ICD-10-CM    1. Vertigo  R42       2. Abnormality of gait  R26.9                      Subjective: "My pain is a 10."      Objective: See treatment diary below      Assessment: Tolerated treatment well. Patient would benefit from continued PT      Plan: Continue per plan of care.       Precautions:no known allergies, chronic neck and back pain     Decreased cervical stabilization noted   Manuals  Spring of ribs on left , trial thoracic spring test  10/25, 23  man     Man also ap cervic mobs grade 2 pronec 2, c 6       Man cervical mobs grade 3 in prone c 2 -c 6 and thoracic ap        Dixhallpike testing  I eval  poor tolerance on right c/o increased dizziness greater than left no nystagmus noted r or left  1/2 sommersault BPPV maneuver to self x 3 to right           Microm[employer]cle balance testing and training  Perf            Graston c spine , direct myofascial release techniques, release of lat, subscapularis , thoracic direct release   Man trigger point release, thoracic spine  and myofascial release Man 30 min   and trigger point release manual c1,2,3 on left 15 min  Trigger point release periscapular region left greater than right , and myofascial release        Snags mulligan  3 reps 6 oscillations r/l 3 reps 6 oscillations r/l then left again 3 more reps  3 reps x 6         Neuro Re-Ed  Cervical mobs rotation and upper t spine             Chin tucks  5 reps 5 sec hold  5 reps  10 reps  2 x 10 10 reps 10 reps        Cervical isometrics   5 rep 5 sec hold At home 5sec x 5 5 x 5 5 x 5       Tandem gait 1 min x 1 1minx 1 fwd, bwd  1 min         Tandem stance 30 sec x 3 30 sec x 3  last rep eyes closed   20sec x 5         Head diagonal exer  Demonstrated pt may try at home at later time Hold if increases her vertigo Chin tuck with sidelying to sit  X 4 Being performed Perf   Does decrease c/o vertigo        Ball toss to self  while walking , side to side ball toss with PT /PTA   30 ft x 2          Ther Ex             Watch ball, trunk flex /ext, rot, ccw and cw circles    10 reps each          Hallway ball toss r/l and sidestepping ball in front against wall    100 ft x 2 100ft x 4  100 ft x 4       Trampoline ball toss static , double bounce, jogging   30 reps each   30 reps        Walking toss ball to self , walking toss ball side to side with PT       40 ft x 2                                                           Ther Activity     No charge post exer today brief 10 min        MH               C spine                                                                                    Modalities

## 2023-11-22 ENCOUNTER — OFFICE VISIT (OUTPATIENT)
Dept: PHYSICAL THERAPY | Age: 48
End: 2023-11-22
Payer: COMMERCIAL

## 2023-11-22 DIAGNOSIS — R42 VERTIGO: Primary | ICD-10-CM

## 2023-11-22 DIAGNOSIS — R26.9 ABNORMALITY OF GAIT: ICD-10-CM

## 2023-11-22 PROCEDURE — 97112 NEUROMUSCULAR REEDUCATION: CPT

## 2023-11-22 PROCEDURE — 97140 MANUAL THERAPY 1/> REGIONS: CPT

## 2023-11-23 NOTE — PROGRESS NOTES
Daily Note     Today's date: 2023  Patient name: Xi Delatorre  : 1975  MRN: [id number]  Referring provider: Shaniqua Brooks DO  Dx:   Encounter Diagnosis     ICD-10-CM    1. Vertigo  R42       2. Abnormality of gait  R26.9                      Subjective: No new c/o's . Pt to transfer to spine specialist on 23 at [facility] PT site for cervical evaluation. Objective: See treatment diary below      Assessment: Tolerated treatment well. Patient would benefit from continued PT. Fakuda step testing now within functional limiits eo open 2 inches forward wfl's,   eyes closed 10 inches forward  wfl's no rotation. Addition of treadmill amb with vert and horizontal head turns with gaze stabilization 5 sets of 10 reps each 2.2-2.5 mph      Plan: Continue per plan of care. Precautions:no known allergies, chronic neck and back pain     Decreased cervical stabilization noted   Manuals  Spring of ribs on left , trial thoracic spring test  10/25, 23 11/1/23  man     Man also ap cervic mobs grade 2 pronec 2, c 6       Man cervical mobs grade 3 in prone c 2 -c 6 and thoracic ap      Man cervical mobs grade 3 in prone ap and thoracic ap      Dixhallpike testing  I eval  poor tolerance on right c/o increased dizziness greater than left no nystagmus noted r or left  1/2 sommersault BPPV maneuver to self x 3 to right           BiodAthigo balance testing and training  Perf            Graston c spine , direct myofascial release techniques, release of lat, subscapularis , thoracic direct release   Man trigger point release, thoracic spine  and myofascial release Man 30 min   and trigger point release manual c1,2,3 on left 15 min  Trigger point release periscapular region left greater than right , and myofascial release  Graston man and myofascial release techniques.       Snags mulligan  3 reps 6 oscillations r/l 3 reps 6 oscillations r/l then left again 3 more reps  3 reps x 6 Neuro Re-Ed  Cervical mobs rotation and upper t spine             Chin tucks  5 reps 5 sec hold  5 reps  10 reps  2 x 10 10 reps 10 reps        Cervical isometrics   5 rep 5 sec hold At home 5sec x 5 5 x 5 5 x 5       Tandem gait 1 min x 1 1minx 1 fwd, bwd  1 min         Tandem stance 30 sec x 3 30 sec x 3  last rep eyes closed   20sec x 5         Head diagonal exer  Demonstrated pt may try at home at later time Hold if increases her vertigo            Chin tuck with sidelying to sit  X 4 Being performed Perf   Does decrease c/o vertigo        Ball toss to self  while walking , side to side ball toss with PT /PTA   30 ft x 2          Ther Ex             Watch ball, trunk flex /ext, rot, ccw and cw circles    10 reps each          Hallway ball toss r/l and sidestepping ball in front against wall    100 ft x 2 100ft x 4  100 ft x 4       Trampoline ball toss static , double bounce, jogging   30 reps each   30 reps        Walking toss ball to self , walking toss ball side to side with PT       40 ft x 2       Treadmill amb with vertical and horizontal head turns       5 sets of 10 each 2.2 -2.5 mph 10 min total      Squats look thru blinds       3 x 10                                Ther Activity     No charge post exer today brief 10 min        MH               C spine                                                                                    Modalities

## 2023-11-28 ENCOUNTER — APPOINTMENT (OUTPATIENT)
Dept: PHYSICAL THERAPY | Age: 48
End: 2023-11-28
Payer: COMMERCIAL

## 2023-11-30 ENCOUNTER — APPOINTMENT (OUTPATIENT)
Dept: PHYSICAL THERAPY | Age: 48
End: 2023-11-30
Payer: COMMERCIAL

## 2023-12-04 ENCOUNTER — EVALUATION (OUTPATIENT)
Dept: PHYSICAL THERAPY | Facility: CLINIC | Age: 48
End: 2023-12-04
Payer: COMMERCIAL

## 2023-12-04 ENCOUNTER — ANNUAL EXAM (OUTPATIENT)
Dept: OBGYN CLINIC | Facility: CLINIC | Age: 48
End: 2023-12-04
Payer: COMMERCIAL

## 2023-12-04 VITALS
BODY MASS INDEX: 25.52 KG/M2 | WEIGHT: 158.8 LBS | SYSTOLIC BLOOD PRESSURE: 126 MMHG | HEIGHT: 66 IN | DIASTOLIC BLOOD PRESSURE: 82 MMHG

## 2023-12-04 DIAGNOSIS — M53.82: Primary | ICD-10-CM

## 2023-12-04 DIAGNOSIS — Z01.419 ENCOUNTER FOR ANNUAL ROUTINE GYNECOLOGICAL EXAMINATION: Primary | ICD-10-CM

## 2023-12-04 DIAGNOSIS — Z12.31 ENCOUNTER FOR SCREENING MAMMOGRAM FOR MALIGNANT NEOPLASM OF BREAST: ICD-10-CM

## 2023-12-04 DIAGNOSIS — N95.1 PERIMENOPAUSE: ICD-10-CM

## 2023-12-04 PROCEDURE — S0612 ANNUAL GYNECOLOGICAL EXAMINA: HCPCS | Performed by: OBSTETRICS & GYNECOLOGY

## 2023-12-04 PROCEDURE — 97162 PT EVAL MOD COMPLEX 30 MIN: CPT | Performed by: PHYSICAL THERAPIST

## 2023-12-04 NOTE — PROGRESS NOTES
Assessment/Plan:  Pap smear deferred due to low risk status. Encouraged self breast examination as well as calcium supplementation. Continue annual mammogram.  Reviewed colon cancer screening, declines. Reviewed signs and symptoms of perimenopause. All questions answered. She will continue to follow-up with primary care as scheduled. Return to office in 1 year or as needed  No problem-specific Assessment & Plan notes found for this encounter. Diagnoses and all orders for this visit:    Encounter for annual routine gynecological examination    Encounter for screening mammogram for malignant neoplasm of breast    Perimenopause          Subjective:      Patient ID: Artelia Libman is a 51 y.o. female. HPI    This is a 50-year-old female P2 ( x 2, age 14, 14) presents for her GYN exam.  Her last menstrual cycle 3/2023. She does get hot flashes and night sweats. There is been no changes in bowel or bladder function. She is not sexually active. Pap smears have been normal.  Last Pap . She follows up with her family physician on a regular basis. 2023 mammo, within normal limits    The following portions of the patient's history were reviewed and updated as appropriate: allergies, current medications, past family history, past medical history, past social history, past surgical history, and problem list.    Review of Systems   Constitutional:  Negative for fatigue, fever and unexpected weight change. Respiratory:  Negative for cough, chest tightness, shortness of breath and wheezing. Cardiovascular: Negative. Negative for chest pain and palpitations. Gastrointestinal: Negative. Negative for abdominal distention, abdominal pain, blood in stool, constipation, diarrhea, nausea and vomiting. Genitourinary: Negative.   Negative for difficulty urinating, dyspareunia, dysuria, flank pain, frequency, genital sores, hematuria, pelvic pain, urgency, vaginal bleeding, vaginal discharge and vaginal pain. Skin:  Negative for rash. Objective:      /82   Ht 5' 6" (1.676 m)   Wt 72 kg (158 lb 12.8 oz)   LMP 03/01/2023 (Approximate)   BMI 25.63 kg/m²          Physical Exam  Constitutional:       Appearance: Normal appearance. She is well-developed. HENT:      Head: Normocephalic and atraumatic. Cardiovascular:      Rate and Rhythm: Normal rate and regular rhythm. Pulmonary:      Effort: Pulmonary effort is normal.      Breath sounds: Normal breath sounds. Chest:   Breasts:     Right: No inverted nipple, mass, nipple discharge, skin change or tenderness. Left: No inverted nipple, mass, nipple discharge, skin change or tenderness. Abdominal:      General: Bowel sounds are normal. There is no distension. Palpations: Abdomen is soft. Tenderness: There is no abdominal tenderness. There is no guarding or rebound. Genitourinary:     Labia:         Right: No rash, tenderness or lesion. Left: No rash, tenderness or lesion. Vagina: Normal. No signs of injury. No vaginal discharge or tenderness. Cervix: No cervical motion tenderness, discharge, friability, lesion or cervical bleeding. Uterus: Not enlarged and not tender. Adnexa:         Right: No mass, tenderness or fullness. Left: No mass, tenderness or fullness. Neurological:      Mental Status: She is alert.    Psychiatric:         Behavior: Behavior normal.

## 2023-12-04 NOTE — PROGRESS NOTES
PT Evaluation     Today's date: 2023  Patient name: Kelly Vásquez  : 1975  MRN: [id number]  Referring provider: Jennifer Henley DO  Dx:   Encounter Diagnosis     ICD-10-CM    1. Dysfunction of cervical spine  M53.82 Ambulatory Referral to Physical Therapy                     Assessment  Assessment details: Patient is a 51 y.o. female with a dx of neck and thoracic pain. Patient has been seeing PT for vestibular rehab and was referred to me to rule out cervical joint issues as it is known she has a myofascial component. Patient reports onset of pain complaints occurred 6 years ago and has progressively worsened. She has had chiropractic intervention without benefit. Clinical examination show no directional preference and no evidence of cervicothoracic joint hypomobility. There does appear to be a myofascial component and feel strengthening need to be a component of her rehab process. She was instructed to stay with her primary therapist and [facility] and she was agreeable to this plan. No other referral appears necessary at this time. Impairments: abnormal muscle firing, abnormal muscle tone, activity intolerance, lacks appropriate home exercise program, pain with function and poor posture   Understanding of Dx/Px/POC: good   Prognosis: good    Subjective Evaluation    History of Present Illness  Date of onset: 2017  Mechanism of injury: Chronic 6 year history of neck, thoracic and LBP. Patient Goals  Patient goals for therapy: decreased pain, increased motion, increased strength, independence with ADLs/IADLs and return to sport/leisure activities    Pain  Current pain rating: 10  At best pain ratin  At worst pain rating: 10  Location: L sided c/spine, t/spine and l/spine  Quality: discomfort and tight  Aggravating factors: lifting (lying)  Symptom course: Slightly improved.     Social Support    Employment status: working  Treatments  Previous treatment: physical therapy, chiropractic and massage    Objective     Concurrent Complaints  Positive for disturbed sleep, dizziness and headaches. Negative for night pain    Postural Observations  Seated posture: fair  Correction of posture: has no consistent effect      Active Range of Motion   Cervical/Thoracic Spine       Cervical  Subcranial protraction:  WFL   Subcranial retraction:  WFL   Flexion:  WFL  Extension:  WFL  Left lateral flexion:  WFL  Right lateral flexion:  WFL  Left rotation:  WFL  Right rotation:  [facility]    Thoracic    Flexion:  WFL  Extension:  WFL  Left lateral flexion:  Restriction level: minimal  Right lateral flexion:  Restriction level: minimal    Joint Play   Joints within functional limits: C2, C3, C4, C5, C6, C7, T1, T2, T3, T4, T5 and T6     Tests   Cervical   Negative repeated extension, repeated flexion and cervical distraction. Left   Negative Spurling's Test A and cervical flexion-rotation test.     Right   Negative Spurling's Test A and cervical flexion-rotation test.   Neuro Exam:     Headaches   Patient reports headaches: Yes.

## 2023-12-06 ENCOUNTER — OFFICE VISIT (OUTPATIENT)
Dept: PHYSICAL THERAPY | Age: 48
End: 2023-12-06
Payer: COMMERCIAL

## 2023-12-06 DIAGNOSIS — M53.82: ICD-10-CM

## 2023-12-06 DIAGNOSIS — R42 VERTIGO: Primary | ICD-10-CM

## 2023-12-06 DIAGNOSIS — R26.9 ABNORMALITY OF GAIT: ICD-10-CM

## 2023-12-06 PROCEDURE — 97110 THERAPEUTIC EXERCISES: CPT

## 2023-12-06 PROCEDURE — 97140 MANUAL THERAPY 1/> REGIONS: CPT

## 2023-12-07 NOTE — PROGRESS NOTES
Daily Note     Today's date: 2023  Patient name: Kennedy Siegel  : 1975  MRN: [id number]  Referring provider: Darrell Avalos DO  Dx:   Encounter Diagnosis     ICD-10-CM    1. Vertigo  R42       2. Abnormality of gait  R26.9       3. Dysfunction of cervical spine  M53.82                      Subjective: "I have made progress I believe. There is still more to address."       Objective: See treatment diary below    Decreased cervical stabilization noted   Manuals  Spring of ribs on left , trial thoracic spring test  10/25, 23 11/1/23  man      Man also ap cervic mobs grade 2 pronec 2, c 6         Man cervical mobs grade 3 in prone c 2 -c 6 and thoracic ap       Man cervical mobs grade 3 in prone ap and thoracic ap      Man grade 3, strain counterstrain left c1, c2       Dixhallpike testing  I eval  poor tolerance on right c/o increased dizziness greater than left no nystagmus noted r or left   1/2 sommersault BPPV maneuver to self x 3 to right           neg r and left testingtoday able to tolerate       [employer] balance testing and training   Perf             myofascial release thoracolumbar spine transverse and longitudinally       Graston c spine , direct myofascial release techniques, release of lat, subscapularis , thoracic direct release    Man trigger point release, thoracic spine  and myofascial release Man 30 min   and trigger point release manual c1,2,3 on left 15 min   Trigger point release periscapular region left greater than right , and myofascial release  Graston man and myofascial release techniques.          Snags mulligan   3 reps 6 oscillations r/l 3 reps 6 oscillations r/l then left again 3 more reps  3 reps x 6               Neuro Re-Ed   Cervical mobs rotation and upper t spine                    Chin tucks  5 reps 5 sec hold  5 reps  10 reps  2 x 10 10 reps 10 reps     10       Cervical isometrics    5 rep 5 sec hold At home 5sec x 5 5 x 5 5 x 5 Tandem gait 1 min x 1 1minx 1 fwd, bwd   1 min               Tandem stance 30 sec x 3 30 sec x 3  last rep eyes closed    20sec x 5               Head diagonal exer  Demonstrated pt may try at home at later time Hold if increases her vertigo                    Chin tuck with sidelying to sit  X 4 Being performed Perf    Does decrease c/o vertigo             Ball toss to self  while walking , side to side ball toss with PT /PTA     30 ft x 2                 Ther Ex                       Watch ball, trunk flex /ext, rot, ccw and cw circles      10 reps each                 Hallway ball toss r/l and sidestepping ball in front against wall      100 ft x 2 100ft x 4   100 ft x 4           Trampoline ball toss static , double bounce, jogging     30 reps each     30 reps            Walking toss ball to self , walking toss ball side to side with PT            40 ft x 2           Treadmill amb with vertical and horizontal head turns             5 sets of 10 each 2.2 -2.5 mph 10 min total         Squats look thru blinds             3 x 10          DNF               10 sec x 3          prone neck extensors              10 sec holds x 3         Ther Activity         No charge post exer today brief 10 min   Leg  thrusts demonstration to perform at home         [facility]                         C spine                                            Assessment: Tolerated treatment well. Patient would benefit from continued PT      Plan: Continue per plan of care.       {

## 2023-12-13 ENCOUNTER — OFFICE VISIT (OUTPATIENT)
Dept: PHYSICAL THERAPY | Age: 48
End: 2023-12-13
Payer: COMMERCIAL

## 2023-12-13 DIAGNOSIS — R42 VERTIGO: Primary | ICD-10-CM

## 2023-12-13 PROCEDURE — 97140 MANUAL THERAPY 1/> REGIONS: CPT

## 2023-12-13 PROCEDURE — 97110 THERAPEUTIC EXERCISES: CPT

## 2023-12-13 NOTE — PROGRESS NOTES
Subjective: Daily Note     Today's date: 2023  Patient name: Rito Villasenor  : 1975  MRN: [id number]  Referring provider: Ney George DO  Dx:   Encounter Diagnosis     ICD-10-CM    1. Vertigo  R42                      Subjective: ***      Objective: See treatment diary below      Assessment: Tolerated treatment {Tolerated treatment :9009133225}. Patient {assessment:}      Plan: {PLAN:2829094246}     {  Decreased cervical stabilization noted   Manuals  Spring of ribs on left , trial thoracic spring test  10/25, 23 11/1/23  man      Man also ap cervic mobs grade 2 pronec 2, c 6         Man cervical mobs grade 3 in prone c 2 -c 6 and thoracic ap       Man cervical mobs grade 3 in prone ap and thoracic ap       Man grade 3, strain counterstrain left c1, c2       Dixhallpike testing  I eval  poor tolerance on right c/o increased dizziness greater than left no nystagmus noted r or left   1/2 sommersault BPPV maneuver to self x 3 to right           neg r and left testingtoday able to tolerate       [employer] balance testing and training   Perf             myofascial release thoracolumbar spine transverse and longitudinally       Graston c spine , direct myofascial release techniques, release of lat, subscapularis , thoracic direct release    Man trigger point release, thoracic spine  and myofascial release Man 30 min   and trigger point release manual c1,2,3 on left 15 min   Trigger point release periscapular region left greater than right , and myofascial release  Graston man and myofascial release techniques.          Snags mulligan   3 reps 6 oscillations r/l 3 reps 6 oscillations r/l then left again 3 more reps  3 reps x 6               Neuro Re-Ed   Cervical mobs rotation and upper t spine                    Chin tucks  5 reps 5 sec hold  5 reps  10 reps  2 x 10 10 reps 10 reps     10  2 x 10     Cervical isometrics    5 rep 5 sec hold At home 5sec x 5 5 x 5 5 x 5           Tandem gait 1 min x 1 1minx 1 fwd, bwd   1 min               Tandem stance 30 sec x 3 30 sec x 3  last rep eyes closed    20sec x 5               Head diagonal exer  Demonstrated pt may try at home at later time Hold if increases her vertigo                    Chin tuck with sidelying to sit  X 4 Being performed Perf    Does decrease c/o vertigo             Ball toss to self  while walking , side to side ball toss with PT /PTA     30 ft x 2                 Ther Ex                       Watch ball, trunk flex /ext, rot, ccw and cw circles      10 reps each                 Hallway ball toss r/l and sidestepping ball in front against wall      100 ft x 2 100ft x 4   100 ft x 4           Trampoline ball toss static , double bounce, jogging     30 reps each     30 reps            Walking toss ball to self , walking toss ball side to side with PT            40 ft x 2           Treadmill amb with vertical and horizontal head turns             5 sets of 10 each 2.2 -2.5 mph 10 min total         Squats look thru blinds             3 x 10          DNF               10 sec x 3          prone neck extensors              10 sec holds x 3         Ther Activity         No charge post exer today brief 10 min   Leg  thrusts demonstration to perform at home         [facility] [facility]                         C spine

## 2023-12-14 NOTE — PROGRESS NOTES
Daily Note     Today's date: 2023  Patient name: Jessica Dye  : 1975  MRN: [id number]  Referring provider: Enriqueta Burdick DO  Dx:   Encounter Diagnosis     ICD-10-CM    1. Vertigo  R42                      Subjective: Pt. Denies vertigo. P.T. instructed daughter in [facility] and strain counter strain. Objective: See treatment diary below      Assessment: Tolerated treatment well. Patient would benefit from continued PT      Plan: Continue per plan of care. {  Manuals  Spring of ribs on left , trial thoracic spring test  10/25, 23 11/1/23  man      Man also ap cervic mobs grade 2 pronec 2, c 6         Man cervical mobs grade 3 in prone c 2 -c 6 and thoracic ap       Man cervical mobs grade 3 in prone ap and thoracic ap       Man grade 3, strain counterstrain left c1, c2       Dixhallpike testing  I eval  poor tolerance on right c/o increased dizziness greater than left no nystagmus noted r or left   1/2 sommersault BPPV maneuver to self x 3 to right           neg r and left testingtoday able to tolerate       [employer] balance testing and training   Perf             myofascial release thoracolumbar spine transverse and longitudinally       Graston c spine , direct myofascial release techniques, release of lat, subscapularis , thoracic direct release    Man trigger point release, thoracic spine  and myofascial release Man 30 min   and trigger point release manual c1,2,3 on left 15 min   Trigger point release periscapular region left greater than right , and myofascial release  Graston man and myofascial release techniques.     45 min     Snags mulligan   3 reps 6 oscillations r/l 3 reps 6 oscillations r/l then left again 3 more reps  3 reps x 6               Neuro Re-Ed   Cervical mobs rotation and upper t spine                    Chin tucks  5 reps 5 sec hold  5 reps  10 reps  2 x 10 10 reps 10 reps     10  10x     Cervical isometrics    5 rep 5 sec hold At home 5sec x 5 5 x 5 5 x 5      5sec x 5     Tandem gait 1 min x 1 1minx 1 fwd, bwd   1 min               Tandem stance 30 sec x 3 30 sec x 3  last rep eyes closed    20sec x 5               Head diagonal exer  Demonstrated pt may try at home at later time Hold if increases her vertigo                    Chin tuck with sidelying to sit  X 4 Being performed Perf    Does decrease c/o vertigo             Ball toss to self  while walking , side to side ball toss with PT /PTA     30 ft x 2                 Ther Ex                       Watch ball, trunk flex /ext, rot, ccw and cw circles      10 reps each                 Hallway ball toss r/l and sidestepping ball in front against wall      100 ft x 2 100ft x 4   100 ft x 4           Trampoline ball toss static , double bounce, jogging     30 reps each     30 reps            Walking toss ball to self , walking toss ball side to side with PT            40 ft x 2           Treadmill amb with vertical and horizontal head turns             5 sets of 10 each 2.2 -2.5 mph 10 min total         Squats look thru blinds             3 x 10          DNF               10 sec x 3          prone neck extensors              10 sec holds x 3         Ther Activity         No charge post exer today brief 10 min   Leg  thrusts demonstration to perform at home         [id number] [place] [address]

## 2023-12-20 ENCOUNTER — OFFICE VISIT (OUTPATIENT)
Dept: PHYSICAL THERAPY | Age: 48
End: 2023-12-20
Payer: COMMERCIAL

## 2023-12-20 DIAGNOSIS — M54.12 CERVICAL RADICULOPATHY: ICD-10-CM

## 2023-12-20 DIAGNOSIS — R26.9 ABNORMALITY OF GAIT: Primary | ICD-10-CM

## 2023-12-20 PROCEDURE — 97140 MANUAL THERAPY 1/> REGIONS: CPT

## 2023-12-21 NOTE — PROGRESS NOTES
Daily Note     Today's date: 2023  Patient name: Krys Alex  : 1975  MRN: [id number]  Referring provider: Brooke Rider DO  Dx:   Encounter Diagnosis     ICD-10-CM    1. Abnormality of gait  R26.9       2. Cervical radiculopathy  M54.12                      Subjective: no c/o vertigo however left piriformis pain , left si tenderness in addition to left gr trochanteric tenderness and hamstring tenderness with radiation of symptoms into the left periscapular region and left side of the neck       Objective: See treatment diary below      Assessment: Tolerated treatment well. Patient would benefit from continued PT. Report of decreased left si , piriformis and trochanteric pain with kinisiotaping of these areas on the left and left IT band kinesiotaping assist. Pt to maximize are supports in her footwear at all times.       Plan: Continue per plan of care.      {  Manuals  Spring of ribs on left , trial thoracic spring test  10/25, 23 11/1/23  man      Man also ap cervic mobs grade 2 pronec 2, c 6         Man cervical mobs grade 3 in prone c 2 -c 6 and thoracic ap       Man cervical mobs grade 3 in prone ap and thoracic ap       Man grade 3, strain counterstrain left c1, c2   spring of si joint my PT man 15 min   Dixhallpike testing  I eval  poor tolerance on right c/o increased dizziness greater than left no nystagmus noted r or left   1/2 sommersault BPPV maneuver to self x 3 to right           neg r and left testingtoday able to tolerate    man kinesiotaping total 60 min man   [employer] balance testing and training   Perf             myofascial release thoracolumbar spine transverse and longitudinally       Graston c spine , direct myofascial release techniques, release of lat, subscapularis , thoracic direct release    Man trigger point release, thoracic spine  and myofascial release Man 30 min   and trigger point release manual c1,2,3 on left 15 min    Trigger point release periscapular region left greater than right , and myofascial release  Graston man and myofascial release techniques.    45 min  30min , f/b direct myofascial release techniques of gastroc, hamstring , itb on left    Snags mulligan   3 reps 6 oscillations r/l 3 reps 6 oscillations r/l then left again 3 more reps  3 reps x 6               Neuro Re-Ed   Cervical mobs rotation and upper t spine                    Chin tucks  5 reps 5 sec hold  5 reps  10 reps  2 x 10 10 reps 10 reps     10  10x     Cervical isometrics    5 rep 5 sec hold At home 5sec x 5 5 x 5 5 x 5      5sec x 5     Tandem gait 1 min x 1 1minx 1 fwd, bwd   1 min               Tandem stance 30 sec x 3 30 sec x 3  last rep eyes closed    20sec x 5               Head diagonal exer  Demonstrated pt may try at home at later time Hold if increases her vertigo                    Chin tuck with sidelying to sit  X 4 Being performed Perf    Does decrease c/o vertigo             Ball toss to self  while walking , side to side ball toss with PT /PTA     30 ft x 2                 Ther Ex                       Watch ball, trunk flex /ext, rot, ccw and cw circles      10 reps each                 Hallway ball toss r/l and sidestepping ball in front against wall      100 ft x 2 100ft x 4   100 ft x 4           Trampoline ball toss static , double bounce, jogging     30 reps each     30 reps            Walking toss ball to self , walking toss ball side to side with PT            40 ft x 2           Treadmill amb with vertical and horizontal head turns             5 sets of 10 each 2.2 -2.5 mph 10 min total         Squats look thru blinds             3 x 10          DNF               10 sec x 3          prone neck extensors              10 sec holds x 3         Ther Activity         No charge post exer today brief 10 min   Leg  thrusts demonstration to perform at home         [facility]                         C spine

## 2023-12-27 ENCOUNTER — OFFICE VISIT (OUTPATIENT)
Dept: PHYSICAL THERAPY | Age: 48
End: 2023-12-27
Payer: COMMERCIAL

## 2023-12-27 DIAGNOSIS — M54.12 CERVICAL RADICULOPATHY: Primary | ICD-10-CM

## 2023-12-27 DIAGNOSIS — M54.16 LUMBAR RADICULOPATHY: ICD-10-CM

## 2023-12-27 PROCEDURE — 97112 NEUROMUSCULAR REEDUCATION: CPT

## 2023-12-27 PROCEDURE — 97110 THERAPEUTIC EXERCISES: CPT

## 2023-12-27 PROCEDURE — 97140 MANUAL THERAPY 1/> REGIONS: CPT

## 2023-12-27 NOTE — PROGRESS NOTES
Daily Note     Today's date: 2023  Patient name: Krys Alex  : 1975  MRN: [id number]  Referring provider: Brooke Rider DO  Dx:   Encounter Diagnosis     ICD-10-CM    1. Cervical radiculopathy  M54.12                      Subjective: ***      Objective: See treatment diary below      Assessment: Tolerated treatment {Tolerated treatment :9800260203}. Patient {assessment:}      Plan: {PLAN:4489000144}     {  Manuals  Spring of ribs on left , trial thoracic spring test  10/25, 23 11/1/23  man      Man also ap cervic mobs grade 2 pronec 2, c 6         Man cervical mobs grade 3 in prone c 2 -c 6 and thoracic ap       Man cervical mobs grade 3 in prone ap and thoracic ap       Man grade 3, strain counterstrain left c1, c2   spring of si joint my PT man 15 min   Dixhallpike testing  I eval  poor tolerance on right c/o increased dizziness greater than left no nystagmus noted r or left   1/2 sommersault BPPV maneuver to self x 3 to right           neg r and left testingtoday able to tolerate    man kinesiotaping total 60 min man   [employer] balance testing and training   Perf             myofascial release thoracolumbar spine transverse and longitudinally       Graston c spine , direct myofascial release techniques, release of lat, subscapularis , thoracic direct release    Man trigger point release, thoracic spine  and myofascial release Man 30 min   and trigger point release manual c1,2,3 on left 15 min   Trigger point release periscapular region left greater than right , and myofascial release  Graston man and myofascial release techniques.    45 min  30min , f/b direct myofascial release techniques of gastroc, hamstring , itb on left    Snags mulligan   3 reps 6 oscillations r/l 3 reps 6 oscillations r/l then left again 3 more reps  3 reps x 6               Neuro Re-Ed   Cervical mobs rotation and upper t spine                    Chin tucks  5  reps 5 sec hold  5 reps  10 reps  2 x 10 10 reps 10 reps     10  10x     Cervical isometrics    5 rep 5 sec hold At home 5sec x 5 5 x 5 5 x 5      5sec x 5     Tandem gait 1 min x 1 1minx 1 fwd, bwd   1 min               Tandem stance 30 sec x 3 30 sec x 3  last rep eyes closed    20sec x 5               Head diagonal exer  Demonstrated pt may try at home at later time Hold if increases her vertigo                    Chin tuck with sidelying to sit  X 4 Being performed Perf    Does decrease c/o vertigo             Ball toss to self  while walking , side to side ball toss with PT /PTA     30 ft x 2                 Ther Ex                       Watch ball, trunk flex /ext, rot, ccw and cw circles      10 reps each                 Hallway ball toss r/l and sidestepping ball in front against wall      100 ft x 2 100ft x 4   100 ft x 4           Trampoline ball toss static , double bounce, jogging     30 reps each     30 reps            Walking toss ball to self , walking toss ball side to side with PT            40 ft x 2           Treadmill amb with vertical and horizontal head turns             5 sets of 10 each 2.2 -2.5 mph 10 min total         Squats look thru blinds             3 x 10          DNF               10 sec x 3          prone neck extensors              10 sec holds x 3         Ther Activity         No charge post exer today brief 10 min   Leg  thrusts demonstration to perform at home         [facility]                         C spine

## 2023-12-27 NOTE — PROGRESS NOTES
"PT Re-Evaluation     Today's date: 2023  Patient name: Krys Alex  : 1975  MRN: [id number]  Referring provider: Brooke Rider DO  Dx:   Encounter Diagnosis     ICD-10-CM    1. Cervical radiculopathy  M54.12       2. Lumbar radiculopathy  M54.16                      Assessment/Plan    Subjective Evaluation    History of Present Illness  Mechanism of injury: Pt reporting \"I do not have vertigo anymore.  Now it's just the left side of my neck  today also mid and upper lower back causing me pain. \" Pt pt is without vertigo at this time with vertigo goal achieved. PT with chronic left sided cervical pain with addition of left periscapular pain and typically left sided low back pain .           Recurrent probem    Quality of life: good    Pain  Current pain ratin  At best pain ratin  At worst pain rating: 10  Location: mid back pain worst pain today.  left sided neck pain , intermittent left sided low back pain  Quality: dull ache, radiating, pressure, pulling and tight  Relieving factors: heat, change in position and rest  Aggravating factors: overhead activity, lifting, walking, standing and running  Progression: improved      Objective           Precautions: no known allergies      Manuals 23            Dycem pt in prone myofascial release thoracolumbar spine 15 min                                                    Neuro Re-Ed             heelsitting 5 reps 20 sec             Cat /camel 5 reps hold 10 sec            Quadruped trunk sidestretching  5 reps 10 sec            Supine dls alt arm and leg, bridging, partial and diagonal situps, leg thrusts, double arm  and leg ball pass 10 reps            Prone dls all 10 reps             Quadruped alt arm and leg raises hold 5 sec each 10 reps             Squats,  10 reps  each squats            fwd, bwd lunges 5 reps each            Treadmill, lifecycle             Zambrano pull downs, rows, biceps             Zambrano paloff exer cross " body                                                                              Ther Activity                                       Gait Training                                       Modalities

## 2024-01-03 ENCOUNTER — OFFICE VISIT (OUTPATIENT)
Dept: PHYSICAL THERAPY | Age: 49
End: 2024-01-03
Payer: COMMERCIAL

## 2024-01-03 DIAGNOSIS — M54.12 CERVICAL RADICULOPATHY: Primary | ICD-10-CM

## 2024-01-03 DIAGNOSIS — M53.82: ICD-10-CM

## 2024-01-03 PROCEDURE — 97110 THERAPEUTIC EXERCISES: CPT

## 2024-01-03 PROCEDURE — 97140 MANUAL THERAPY 1/> REGIONS: CPT

## 2024-01-04 NOTE — PROGRESS NOTES
"Daily Note     Today's date: 1/3/2024  Patient name: Krys Alex  : 1975  MRN: [id number]  Referring provider: Brooke Rider DO  Dx:   Encounter Diagnosis     ICD-10-CM    1. Cervical radiculopathy  M54.12       2. 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 Patient would benefit from continued PT. Emphasis on core stabilization.       Plan: Continue per plan of care.      Precautions: no known allergies      Manuals 12/27/23 1/3/24           Dycem pt in prone myofascial release thoracolumbar spine 15 min             Cervical strain , counterstrain  c 1, c2   Man             Cervical mobs with rotation in sitting c,2, c3, c4  Man total  30 min                        Neuro Re-Ed             heelsitting 5 reps 20 sec  5 reps 20 sec hold           Cat /camel 5 reps hold 10 sec 5 reps 10 sec hold           Quadruped trunk sidestretching  5 reps 10 sec Using swiss ball x 5 hold 15-20 sec           Supine dls alt arm and leg, bridging, partial and diagonal situps, leg thrusts, double arm  and leg ball pass 10 reps 10 reps 1# wts x 4 extremities            Prone dls all 10 reps  10 reps 1#x 4 extremities           Quadruped alt arm and leg raises hold 5 sec each 10 reps  10 reps 1# wts x 4 extremities           Squats,  10 reps  each squats 3 x 10           fwd, bwd lunges 5 reps each            Treadmill, lifecycle             Zambrano pull downs, rows, biceps             Zambrano paloff exer cross body                                                                              Ther Activity                                       Gait Training                                       Modalities                                            "

## 2024-01-10 ENCOUNTER — OFFICE VISIT (OUTPATIENT)
Dept: PHYSICAL THERAPY | Age: 49
End: 2024-01-10
Payer: COMMERCIAL

## 2024-01-10 DIAGNOSIS — R26.9 ABNORMALITY OF GAIT: ICD-10-CM

## 2024-01-10 DIAGNOSIS — M54.12 CERVICAL RADICULOPATHY: Primary | ICD-10-CM

## 2024-01-10 PROCEDURE — 97110 THERAPEUTIC EXERCISES: CPT

## 2024-01-10 PROCEDURE — 97140 MANUAL THERAPY 1/> REGIONS: CPT

## 2024-01-11 NOTE — PROGRESS NOTES
"Daily Note     Today's date: 1/10/2024  Patient name: Krys Alex  : 1975  MRN: [id number]  Referring provider: Brooke Rider DO  Dx:   Encounter Diagnosis     ICD-10-CM    1. Cervical radiculopathy  M54.12       2. Abnormality of gait  R26.9                   .    Subjective: \"I have a bad headache today. Pt with persistent left sided cervical pain. PT to investigate within the network myofascial release/chronic pain specialist in the future. Pt's family member not able to work on her as was instructed in PT this weekend. Current work duties counteract successes made in PT with PT to attempting to increase muscle strength. No strength training today due to headache and increased pain.       Objective: See treatment diary below      Assessment: Tolerated treatment fair. Patient would benefit from continued PT      Plan: Continue per plan of care.      Precautions: no known allergies      Manuals 12/27/23 1/3/24 1/10/24          Dycem pt in prone myofascial release thoracolumbar spine 15 min   15 min man          Cervical strain , counterstrain  c 1, c2   Man   15 min man          Cervical mobs with rotation in sitting c,2, c3, c4  Man total  30 min 15 min man sitting and prone          Trigger point release left periscapular region    Man total 45 min for treatment today          Neuro Re-Ed             heelsitting 5 reps 20 sec  5 reps 20 sec hold 5 reps           Cat /camel 5 reps hold 10 sec 5 reps 10 sec hold 5 reps          Quadruped trunk sidestretching  5 reps 10 sec Using swiss ball x 5 hold 15-20 sec Using swiss ball x 5          Supine dls alt arm and leg, bridging, partial and diagonal situps, leg thrusts, double arm  and leg ball pass 10 reps 10 reps 1# wts x 4 extremities            Prone dls all 10 reps  10 reps 1#x 4 extremities           Quadruped alt arm and leg raises hold 5 sec each 10 reps  10 reps 1# wts x 4 extremities           Squats,  10 reps  each squats 3 x 10 3 x 10    "       fwd, bwd lunges 5 reps each            Treadmill, lifecycle             Zambrano pull downs, rows, biceps             [facility] exer cross body                                                                              Ther Activity                                       Gait Training                                       Modalities

## 2024-01-17 ENCOUNTER — APPOINTMENT (OUTPATIENT)
Dept: PHYSICAL THERAPY | Age: 49
End: 2024-01-17
Payer: COMMERCIAL

## 2024-01-24 ENCOUNTER — OFFICE VISIT (OUTPATIENT)
Dept: PHYSICAL THERAPY | Age: 49
End: 2024-01-24
Payer: COMMERCIAL

## 2024-01-24 DIAGNOSIS — M54.12 CERVICAL RADICULOPATHY: Primary | ICD-10-CM

## 2024-01-24 PROCEDURE — 97110 THERAPEUTIC EXERCISES: CPT

## 2024-01-24 NOTE — PROGRESS NOTES
Daily Note     Today's date: 2024  Patient name: Krys Alex  : 1975  MRN: [id number]  Referring provider: Brooke Rider DO  Dx:   Encounter Diagnosis     ICD-10-CM    1. Cervical radiculopathy  M54.12                      Subjective: Tightness sub op area.      Objective: See treatment diary below      Assessment: Tolerated treatment well. Patient would benefit from continued PT      Plan: Continue per plan of care.      Precautions: no known allergies      Manuals 12/27/23 1/3/24 1/10/24 1/24         Dycem pt in prone myofascial release thoracolumbar spine 15 min   15 min man          Cervical strain , counterstrain  c 1, c2   Man   15 min man          Cervical mobs with rotation in sitting c,2, c3, c4  Man total  30 min 15 min man sitting and prone          Trigger point release left periscapular region    Man total 45 min for treatment today          Neuro Re-Ed             heelsitting 5 reps 20 sec  5 reps 20 sec hold 5 reps  10sec x 5         Cat /camel 5 reps hold 10 sec 5 reps 10 sec hold 5 reps 10sec x 10         Quadruped trunk sidestretching  5 reps 10 sec Using swiss ball x 5 hold 15-20 sec Using swiss ball x 5 20x         Supine dls alt arm and leg, bridging, partial and diagonal situps, leg thrusts, double arm  and leg ball pass 10 reps 10 reps 1# wts x 4 extremities   1# 20x         Prone dls all 10 reps  10 reps 1#x 4 extremities           Quadruped alt arm and leg raises hold 5 sec each 10 reps  10 reps 1# wts x 4 extremities           Squats,  10 reps  each squats 3 x 10 3 x 10 30x         fwd, bwd lunges 5 reps each            Treadmill, lifecycle    15 min         Zambrano pull downs, rows, biceps             Zambrano sujatha exer cross body                                                                              Ther Activity                                       Gait Training                                       Modalities

## 2024-01-31 ENCOUNTER — OFFICE VISIT (OUTPATIENT)
Dept: PHYSICAL THERAPY | Age: 49
End: 2024-01-31
Payer: COMMERCIAL

## 2024-01-31 DIAGNOSIS — M54.12 CERVICAL RADICULOPATHY: Primary | ICD-10-CM

## 2024-01-31 PROCEDURE — 97140 MANUAL THERAPY 1/> REGIONS: CPT

## 2024-02-01 NOTE — PROGRESS NOTES
"Daily Note     Today's date: 2024  Patient name: Krys Alex  : 1975  MRN: [id number]  Referring provider: Brooke Rider DO  Dx:   Encounter Diagnosis     ICD-10-CM    1. Cervical radiculopathy  M54.12                      Subjective: \"My condition is the same. \"      Objective: See treatment diary below      Assessment: Tolerated treatment well. Patient would benefit from continued PT. Pt unable to perform any strengthening due to multisite pain.  EPAT specific attachment not yet present at this facility.       Plan: Continue per plan of care. Prepare for transfer to [facility] for continued PT in the near future as EPAT present  with all attachments and dry needling also performed at this time. No vertigo present however no consistent  or long lasting improvement in status being achieved. Temporary relief of symptoms only noted.      Precautions: no known allergies      Manuals 12/27/23 1/3/24 1/10/24 1/24         Dycem pt in prone myofascial release thoracolumbar spine 15 min   15 min man          Cervical strain , counterstrain  c 1, c2   Man   15 min man 15 min man         Cervical mobs with rotation in sitting c,2, c3, c4  Man total  30 min 15 min man sitting and prone Man 30 min          Trigger point release left periscapular region    Man total 45 min for treatment today Man 15 min total 60 min man         Neuro Re-Ed             heelsitting 5 reps 20 sec  5 reps 20 sec hold 5 reps  10sec x 5         Cat /camel 5 reps hold 10 sec 5 reps 10 sec hold 5 reps 10sec x 10         Quadruped trunk sidestretching  5 reps 10 sec Using swiss ball x 5 hold 15-20 sec Using swiss ball x 5 20x         Supine dls alt arm and leg, bridging, partial and diagonal situps, leg thrusts, double arm  and leg ball pass 10 reps 10 reps 1# wts x 4 extremities   1# 20x         Prone dls all 10 reps  10 reps 1#x 4 extremities           Quadruped alt arm and leg raises hold 5 sec each 10 reps  " 10 reps 1# wts x 4 extremities           Squats,  10 reps  each squats 3 x 10 3 x 10 30x         fwd, bwd lunges 5 reps each            Treadmill, lifecycle    15 min         Juan Manuel pull downs, rows, biceps             Juan Manuel solares exer cross body                                                                              Ther Activity                                       Gait Training                                       Modalities

## 2024-02-07 ENCOUNTER — OFFICE VISIT (OUTPATIENT)
Dept: PHYSICAL THERAPY | Age: 49
End: 2024-02-07
Payer: COMMERCIAL

## 2024-02-07 DIAGNOSIS — M54.12 CERVICAL RADICULOPATHY: Primary | ICD-10-CM

## 2024-02-07 PROCEDURE — 97110 THERAPEUTIC EXERCISES: CPT

## 2024-02-07 PROCEDURE — 97140 MANUAL THERAPY 1/> REGIONS: CPT

## 2024-02-07 NOTE — PROGRESS NOTES
Daily Note     Today's date: 2024  Patient name: Krys Alex  : 1975  MRN: [id number]  Referring provider: Brooke Rider DO  Dx:   Encounter Diagnosis     ICD-10-CM    1. Cervical radiculopathy  M54.12                      Subjective: Trial EPAT left UT,sub op area and left medial scap.      Objective: See treatment diary below      Assessment: Tolerated treatment well. Patient would benefit from continued PT      Plan: Continue per plan of care.      Precautions: no known allergies      Manuals 12/27/23 1/3/24 1/10/24 1/24 2/7        Dycem pt in prone myofascial release thoracolumbar spine 15 min   15 min man          Cervical strain , counterstrain  c 1, c2   Man   15 min man 15 min man         Cervical mobs with rotation in sitting c,2, c3, c4  Man total  30 min 15 min man sitting and prone Man 30 min  EPAT  15 min        Trigger point release left periscapular region    Man total 45 min for treatment today Man 15 min total 60 min man         Neuro Re-Ed             heelsitting 5 reps 20 sec  5 reps 20 sec hold 5 reps  10sec x 5 10sec x 5        Cat /camel 5 reps hold 10 sec 5 reps 10 sec hold 5 reps 10sec x 10         Quadruped trunk sidestretching  5 reps 10 sec Using swiss ball x 5 hold 15-20 sec Using swiss ball x 5 20x         Supine dls alt arm and leg, bridging, partial and diagonal situps, leg thrusts, double arm  and leg ball pass 10 reps 10 reps 1# wts x 4 extremities   1# 20x 1.5# 20x        Prone dls all 10 reps  10 reps 1#x 4 extremities   1.5# 20x        Quadruped alt arm and leg raises hold 5 sec each 10 reps  10 reps 1# wts x 4 extremities   30x        Squats,  10 reps  each squats 3 x 10 3 x 10 30x         fwd, bwd lunges 5 reps each            Treadmill, lifecycle    15 min 25 min        Juan Manuel pull downs, rows, biceps             Juan Manuel solares exer cross body                                                                              Ther Activity                                        Gait Training                                       Modalities                                                     Authored by Resident/PA/NP

## 2024-02-14 ENCOUNTER — OFFICE VISIT (OUTPATIENT)
Dept: PHYSICAL THERAPY | Age: 49
End: 2024-02-14
Payer: COMMERCIAL

## 2024-02-14 DIAGNOSIS — M54.12 CERVICAL RADICULOPATHY: Primary | ICD-10-CM

## 2024-02-14 PROCEDURE — 97140 MANUAL THERAPY 1/> REGIONS: CPT

## 2024-02-14 PROCEDURE — 97110 THERAPEUTIC EXERCISES: CPT

## 2024-02-14 NOTE — PROGRESS NOTES
Daily Note     Today's date: 2024  Patient name: Krys Alex  : 1975  MRN: [id number]  Referring provider: Brooke Rider DO  Dx:   Encounter Diagnosis     ICD-10-CM    1. Cervical radiculopathy  M54.12                      Subjective: Pt. Reports she had two good days after EPAT treatment.      Objective: See treatment diary below      Assessment: Tolerated treatment well. Patient would benefit from continued PT      Plan: Continue per plan of care.      Precautions: no known allergies      Manuals 12/27/23 1/3/24 1/10/24 1/24 2/7 2/14       Dycem pt in prone myofascial release thoracolumbar spine 15 min   15 min man          Cervical strain , counterstrain  c 1, c2   Man   15 min man 15 min man         Cervical mobs with rotation in sitting c,2, c3, c4  Man total  30 min 15 min man sitting and prone Man 30 min  EPAT  15 min EPAT  15 min       Trigger point release left periscapular region    Man total 45 min for treatment today Man 15 min total 60 min man         Neuro Re-Ed             heelsitting 5 reps 20 sec  5 reps 20 sec hold 5 reps  10sec x 5 10sec x 5 1 min x 2       Cat /camel 5 reps hold 10 sec 5 reps 10 sec hold 5 reps 10sec x 10 1 min x 2 1 min x 2       Quadruped trunk sidestretching  5 reps 10 sec Using swiss ball x 5 hold 15-20 sec Using swiss ball x 5 20x         Supine dls alt arm and leg, bridging, partial and diagonal situps, leg thrusts, double arm  and leg ball pass 10 reps 10 reps 1# wts x 4 extremities   1# 20x 1.5# 20x 1.5#   20x       Prone dls all 10 reps  10 reps 1#x 4 extremities   1.5# 20x 1.5#  20x       Quadruped alt arm and leg raises hold 5 sec each 10 reps  10 reps 1# wts x 4 extremities   30x        Squats,  10 reps  each squats 3 x 10 3 x 10 30x  30x       fwd, bwd lunges 5 reps each            Treadmill, lifecycle    15 min 25 min        Juan Manuel pull downs, rows, biceps             Juan Manuel paloff exer cross body                                                                               Ther Activity                                       Gait Training                                       Modalities

## 2024-02-21 ENCOUNTER — OFFICE VISIT (OUTPATIENT)
Dept: PHYSICAL THERAPY | Age: 49
End: 2024-02-21
Payer: COMMERCIAL

## 2024-02-21 DIAGNOSIS — M54.12 CERVICAL RADICULOPATHY: Primary | ICD-10-CM

## 2024-02-21 PROCEDURE — 97110 THERAPEUTIC EXERCISES: CPT

## 2024-02-21 PROCEDURE — 97140 MANUAL THERAPY 1/> REGIONS: CPT

## 2024-02-21 NOTE — PROGRESS NOTES
Daily Note     Today's date: 2024  Patient name: Krys Alex  : 1975  MRN: [id number]  Referring provider: Brooke Rider DO  Dx:   Encounter Diagnosis     ICD-10-CM    1. Cervical radiculopathy  M54.12                      Subjective: Reports two days without pain.      Objective: See treatment diary below      Assessment: Tolerated treatment well. Patient would benefit from continued PT      Plan: Continue per plan of care.      Precautions: no known allergies      Manuals 12/27/23 1/3/24 1/10/24 1/24 2/7 2/14 2/21      Dycem pt in prone myofascial release thoracolumbar spine 15 min   15 min man          Cervical strain , counterstrain  c 1, c2   Man   15 min man 15 min man         Cervical mobs with rotation in sitting c,2, c3, c4  Man total  30 min 15 min man sitting and prone Man 30 min  EPAT  15 min EPAT  15 min EPAT  15 min      Trigger point release left periscapular region    Man total 45 min for treatment today Man 15 min total 60 min man         Neuro Re-Ed             heelsitting 5 reps 20 sec  5 reps 20 sec hold 5 reps  10sec x 5 10sec x 5 1 min x 2 1 min      Cat /camel 5 reps hold 10 sec 5 reps 10 sec hold 5 reps 10sec x 10 1 min x 2 1 min x 2 1 min      Quadruped trunk sidestretching  5 reps 10 sec Using swiss ball x 5 hold 15-20 sec Using swiss ball x 5 20x         Supine dls alt arm and leg, bridging, partial and diagonal situps, leg thrusts, double arm  and leg ball pass 10 reps 10 reps 1# wts x 4 extremities   1# 20x 1.5# 20x 1.5#   20x 1.5# 20x      Prone dls all 10 reps  10 reps 1#x 4 extremities   1.5# 20x 1.5#  20x 1.5# 20x      Quadruped alt arm and leg raises hold 5 sec each 10 reps  10 reps 1# wts x 4 extremities   30x        Squats,  10 reps  each squats 3 x 10 3 x 10 30x  30x       fwd, bwd lunges 5 reps each            Treadmill, lifecycle    15 min 25 min  15 min      Zambrano pull downs, rows, biceps             Zambrano paloff exer cross body                                                                               Ther Activity                                       Gait Training                                       Modalities

## 2024-02-28 ENCOUNTER — OFFICE VISIT (OUTPATIENT)
Dept: PHYSICAL THERAPY | Age: 49
End: 2024-02-28
Payer: COMMERCIAL

## 2024-02-28 DIAGNOSIS — M54.12 CERVICAL RADICULOPATHY: Primary | ICD-10-CM

## 2024-02-28 PROCEDURE — 97140 MANUAL THERAPY 1/> REGIONS: CPT

## 2024-02-28 PROCEDURE — 97110 THERAPEUTIC EXERCISES: CPT

## 2024-02-28 NOTE — PROGRESS NOTES
Daily Note     Today's date: 2024  Patient name: Krys Alex  : 1975  MRN: [id number]  Referring provider: Brooke Rider DO  Dx:   Encounter Diagnosis     ICD-10-CM    1. Cervical radiculopathy  M54.12                      Subjective: Pt. Reports she does get some relief with   E-PAT.      Objective: See treatment diary below      Assessment: Tolerated treatment well. Patient would benefit from continued PT      Plan: Continue per plan of care.      Precautions: no known allergies      Manuals 12/27/23 1/3/24 1/10/24 1/24 2/7 2/14 2/21 2/28     Dycem pt in prone myofascial release thoracolumbar spine 15 min   15 min man          Cervical strain , counterstrain  c 1, c2   Man   15 min man 15 min man         Cervical mobs with rotation in sitting c,2, c3, c4  Man total  30 min 15 min man sitting and prone Man 30 min  EPAT  15 min EPAT  15 min EPAT  15 min EPAT  15 min     Trigger point release left periscapular region    Man total 45 min for treatment today Man 15 min total 60 min man         Neuro Re-Ed             heelsitting 5 reps 20 sec  5 reps 20 sec hold 5 reps  10sec x 5 10sec x 5 1 min x 2 1 min 1 min     Cat /camel 5 reps hold 10 sec 5 reps 10 sec hold 5 reps 10sec x 10 1 min x 2 1 min x 2 1 min 1 min     Quadruped trunk sidestretching  5 reps 10 sec Using swiss ball x 5 hold 15-20 sec Using swiss ball x 5 20x         Supine dls alt arm and leg, bridging, partial and diagonal situps, leg thrusts, double arm  and leg ball pass 10 reps 10 reps 1# wts x 4 extremities   1# 20x 1.5# 20x 1.5#   20x 1.5# 20x      Prone dls all 10 reps  10 reps 1#x 4 extremities   1.5# 20x 1.5#  20x 1.5# 20x      Quadruped alt arm and leg raises hold 5 sec each 10 reps  10 reps 1# wts x 4 extremities   30x        Squats,  10 reps  each squats 3 x 10 3 x 10 30x  30x       fwd, bwd lunges 5 reps each            Treadmill, lifecycle    15 min 25 min  15 min 15 min     Juan Manuel pull downs, rows, biceps              Juan Manuel solares exer cross body                                                                              Ther Activity                                       Gait Training                                       Modalities

## 2024-04-26 ENCOUNTER — APPOINTMENT (EMERGENCY)
Dept: RADIOLOGY | Facility: HOSPITAL | Age: 49
End: 2024-04-26
Payer: COMMERCIAL

## 2024-04-26 ENCOUNTER — HOSPITAL ENCOUNTER (EMERGENCY)
Facility: HOSPITAL | Age: 49
Discharge: HOME | End: 2024-04-26
Attending: EMERGENCY MEDICINE
Payer: COMMERCIAL

## 2024-04-26 VITALS
OXYGEN SATURATION: 100 % | SYSTOLIC BLOOD PRESSURE: 134 MMHG | RESPIRATION RATE: 20 BRPM | DIASTOLIC BLOOD PRESSURE: 86 MMHG | HEART RATE: 102 BPM | TEMPERATURE: 97.2 F

## 2024-04-26 DIAGNOSIS — M62.830 BACK MUSCLE SPASM: Primary | ICD-10-CM

## 2024-04-26 PROCEDURE — 99283 EMERGENCY DEPT VISIT LOW MDM: CPT

## 2024-04-26 PROCEDURE — 71046 X-RAY EXAM CHEST 2 VIEWS: CPT

## 2024-04-26 PROCEDURE — 99284 EMERGENCY DEPT VISIT MOD MDM: CPT | Performed by: EMERGENCY MEDICINE

## 2024-04-26 RX ORDER — IBUPROFEN 800 MG/1
800 TABLET ORAL 3 TIMES DAILY
Qty: 21 TABLET | Refills: 0 | Status: SHIPPED | OUTPATIENT
Start: 2024-04-26

## 2024-04-26 RX ORDER — DIAZEPAM 5 MG/1
10 TABLET ORAL EVERY 8 HOURS PRN
Qty: 20 TABLET | Refills: 0 | Status: SHIPPED | OUTPATIENT
Start: 2024-04-26 | End: 2024-05-05

## 2024-04-26 RX ORDER — METHOCARBAMOL 500 MG/1
1000 TABLET, FILM COATED ORAL ONCE
Status: COMPLETED | OUTPATIENT
Start: 2024-04-26 | End: 2024-04-26

## 2024-04-26 RX ORDER — IBUPROFEN 400 MG/1
800 TABLET ORAL ONCE
Status: COMPLETED | OUTPATIENT
Start: 2024-04-26 | End: 2024-04-26

## 2024-04-26 RX ADMIN — METHOCARBAMOL TABLETS 1000 MG: 500 TABLET, COATED ORAL at 11:10

## 2024-04-26 RX ADMIN — IBUPROFEN 800 MG: 400 TABLET, FILM COATED ORAL at 11:10

## 2024-04-26 NOTE — Clinical Note
Krys Alex was seen and treated in our emergency department on 4/26/2024.    No restrictions            Diagnosis:     Krys  may return to work on return date.    She may return on this date: 04/29/2024         If you have any questions or concerns, please don't hesitate to call.      Gadiel Khan MD    ______________________________           _______________          _______________  Hospital Representative                              Date                                Time

## 2024-04-26 NOTE — Clinical Note
Krys Alex was seen and treated in our emergency department on 4/26/2024.                Diagnosis:     Krys  .    She may return on this date: 04/28/2024         If you have any questions or concerns, please don't hesitate to call.      Gadiel Khan MD    ______________________________           _______________          _______________  Hospital Representative                              Date                                Time

## 2024-04-26 NOTE — ED PROVIDER NOTES
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 Taking?   Cholecalciferol (VITAMIN D3 PO)  Self Yes No   Sig: Take by mouth   Triamcinolone Acetonide (Nasacort Allergy 24HR) 55 MCG/ACT nasal spray   Yes No   butalbital-acetaminophen-caffeine (FIORICET,ESGIC) -40 mg per tablet   No No   Sig: Take 1 tablet by mouth every 6 (six) hours as needed for headaches   loratadine (Claritin) 10 mg tablet   Yes No   meclizine (ANTIVERT) 12.5 MG tablet   No No   Sig: Take 1 tablet (12.5 mg total) by mouth 3 (three) times a day as needed for dizziness   Patient not taking: Reported on 12/4/2023      Facility-Administered Medications: None       Past Medical History:   Diagnosis Date    COVID-19 05/2022    Finger fracture, left     Intrinsic muscle tightness 01/16/2019    Pure hypercholesterolemia 10/14/2021    Trigger middle finger of left hand 01/16/2019    UTI symptoms 10/14/2021    Vitamin D deficiency        Past Surgical History:   Procedure Laterality Date    LAPAROSCOPIC OVARIAN CYSTECTOMY Right 2001    Lysis of adhesions, Dr. Miranda    OVARIAN CYST REMOVAL Right 1993    open, Dr. Arce    TONSILECTOMY AND ADNOIDECTOMY      TONSILLECTOMY      WISDOM TOOTH EXTRACTION         Family History   Problem Relation Age of Onset    Lung cancer Mother 72        non small cell    No Known Problems Father     No Known Problems Sister     No  Known Problems Sister     No Known Problems Brother     No Known Problems Daughter     No Known Problems Daughter     Hyperlipidemia Maternal Grandmother     Thyroid disease Maternal Grandmother     Lung cancer Maternal Grandmother 60    Coronary artery disease Maternal Grandfather     Hyperlipidemia Maternal Grandfather     Alcohol abuse Maternal Grandfather     Parkinsonism Maternal Grandfather     Heart disease Paternal Grandmother     Heart disease Paternal Grandfather     No Known Problems Maternal Aunt     No Known Problems Paternal Aunt     No Known Problems Paternal Uncle      I have reviewed and agree with the history as documented.    E-Cigarette/Vaping    E-Cigarette Use Never User      E-Cigarette/Vaping Substances    Nicotine No     THC No     CBD No     Flavoring No     Other No     Unknown No      Social History     Tobacco Use    Smoking status: Never    Smokeless tobacco: Never   Vaping Use    Vaping status: Never Used   Substance Use Topics    Alcohol use: Yes     Alcohol/week: 2.0 standard drinks of alcohol     Types: 2 Glasses of wine per week     Comment: occasional    Drug use: Never       Review of Systems   Constitutional:  Negative for fever.   HENT:  Negative for sore throat.    Respiratory:  Negative for shortness of breath.    Cardiovascular:  Negative for chest pain.   Gastrointestinal:  Negative for abdominal pain and vomiting.   Musculoskeletal:  Positive for back pain.   Skin:  Negative for color change and rash.   Neurological:  Negative for syncope, weakness and numbness.   All other systems reviewed and are negative.      Physical Exam  Physical Exam  Vitals and nursing note reviewed.   Constitutional:       General: She is not in acute distress.     Appearance: Normal appearance. She is well-developed. She is not ill-appearing or toxic-appearing.   HENT:      Head: Normocephalic and atraumatic.   Eyes:      Conjunctiva/sclera: Conjunctivae normal.   Cardiovascular:      Rate and  Rhythm: Normal rate and regular rhythm.      Heart sounds: No murmur heard.  Pulmonary:      Effort: Pulmonary effort is normal. No respiratory distress.      Breath sounds: Normal breath sounds.   Abdominal:      Palpations: Abdomen is soft. There is no mass.      Tenderness: There is no abdominal tenderness.   Musculoskeletal:         General: No swelling.      Cervical back: Neck supple.      Comments: Mild spasm to the left lower thoracic back there is no midline tenderness range of motion is limited right now secondary to pain no crepitus or bony tenderness   Skin:     General: Skin is warm and dry.      Capillary Refill: Capillary refill takes less than 2 seconds.   Neurological:      General: No focal deficit present.      Mental Status: She is alert.      Sensory: No sensory deficit.      Motor: No weakness.      Coordination: Coordination normal.      Gait: Gait normal.      Deep Tendon Reflexes: Reflexes normal.   Psychiatric:         Mood and Affect: Mood normal.         Vital Signs  ED Triage Vitals [04/26/24 1053]   Temperature Pulse Respirations Blood Pressure SpO2   (!) 97.2 °F (36.2 °C) 102 20 134/86 100 %      Temp Source Heart Rate Source Patient Position - Orthostatic VS BP Location FiO2 (%)   Tympanic Monitor Sitting Right arm --      Pain Score       10 - Worst Possible Pain           Vitals:    04/26/24 1053   BP: 134/86   Pulse: 102   Patient Position - Orthostatic VS: Sitting         Visual Acuity      ED Medications  Medications   ibuprofen (MOTRIN) tablet 800 mg (800 mg Oral Given 4/26/24 1110)   methocarbamol (ROBAXIN) tablet 1,000 mg (1,000 mg Oral Given 4/26/24 1110)       Diagnostic Studies  Results Reviewed       None                   XR chest 2 views   ED Interpretation by Gadiel Khan MD (04/26 1209)   NAD                 Procedures  Procedures         ED Course                               SBIRT 20yo+      Flowsheet Row Most Recent Value   Initial Alcohol Screen: US AUDIT-C     1.  How often do you have a drink containing alcohol? 0 Filed at: 04/26/2024 1055   2. How many drinks containing alcohol do you have on a typical day you are drinking?  0 Filed at: 04/26/2024 1055   3a. Male UNDER 65: How often do you have five or more drinks on one occasion? 0 Filed at: 04/26/2024 1055   3b. FEMALE Any Age, or MALE 65+: How often do you have 4 or more drinks on one occassion? 0 Filed at: 04/26/2024 1055   Audit-C Score 0 Filed at: 04/26/2024 1055   EDIN: How many times in the past year have you...    Used an illegal drug or used a prescription medication for non-medical reasons? Never Filed at: 04/26/2024 1055                      Medical Decision Making  Patient doing little better after medications given the Motrin and Robaxin patient did not wish anything stronger because she wants to drive understands this might take a little while to resolve she is neurologically intact no signs of cauda equina patient be discharged on pain medicine and muscle relaxers to continue care ice for 24 hours and warm compresses x-rays done to rule out pneumothorax that was negative pneumothorax pneumonia agree does not x-rays of her thoracic spine or lumbar spine with this incident no red flag    Amount and/or Complexity of Data Reviewed  Radiology: ordered and independent interpretation performed.    Risk  Prescription drug management.             Disposition  Final diagnoses:   Back muscle spasm     Time reflects when diagnosis was documented in both MDM as applicable and the Disposition within this note       Time User Action Codes Description Comment    4/26/2024 12:11 PM Gadiel Khan Add [M62.830] Back muscle spasm           ED Disposition       ED Disposition   Discharge    Condition   Stable    Date/Time   Fri Apr 26, 2024 1211    Comment   Krys Alex discharge to home/self care.                   Follow-up Information       Follow up With Specialties Details Why Contact Info    Brooke Rider, DO  Internal Medicine In 3 days  [address]  [address], [address]  [place] [address]  [phone]              Patient's Medications   Discharge Prescriptions    DIAZEPAM (VALIUM) 5 MG TABLET    Take 2 tablets (10 mg total) by mouth every 8 (eight) hours as needed for anxiety (BACK SPASM) for up to 9 days       Start Date: 4/26/2024 End Date: 5/5/2024       Order Dose: 10 mg       Quantity: 20 tablet    Refills: 0    IBUPROFEN (MOTRIN) 800 MG TABLET    Take 1 tablet (800 mg total) by mouth 3 (three) times a day       Start Date: 4/26/2024 End Date: --       Order Dose: 800 mg       Quantity: 21 tablet    Refills: 0       No discharge procedures on file.    PDMP Review         Value Time User    PDMP Reviewed  Yes 4/12/2023  4:26 PM Brooke Rider DO            ED Provider  Electronically Signed by             Gadiel Khan MD  04/26/24 8791

## 2024-04-29 ENCOUNTER — TELEPHONE (OUTPATIENT)
Age: 49
End: 2024-04-29

## 2024-04-29 NOTE — TELEPHONE ENCOUNTER
MRI is not going to be ordered until an evaluation is done to see if it is indicated.  She can try half dose of her valium to see if pain is relieved.  Also, if it is work related, she should have been advised to see occupational medicine.

## 2024-04-29 NOTE — TELEPHONE ENCOUNTER
Pt. Called, she had work related injury on Friday. I made an appointment for her on 5/2 with Dr. Rider. Pt. Is wondering if Script for Back Left Thoracic MRI caould be placed prior to her appointment. She is also looking for substitute for Valium.  According to her Valium knocks her out. She is looking for Baclofen pain reliever to be ordered.  Please advise the Pt.  Thank you!!

## 2024-04-29 NOTE — TELEPHONE ENCOUNTER
Called and spoke with Pt, she was advised by the ED to schedule with her PCP instead of occupational medicine. She is aware and understanding that Dr. Rider wanted to hold off on scheduling and MRI and prescribing baclofen until she was seen in the office and evaluated.

## 2024-04-30 ENCOUNTER — APPOINTMENT (OUTPATIENT)
Dept: URGENT CARE | Age: 49
End: 2024-04-30
Payer: OTHER MISCELLANEOUS

## 2024-04-30 ENCOUNTER — TELEPHONE (OUTPATIENT)
Age: 49
End: 2024-04-30

## 2024-04-30 PROCEDURE — G0382 LEV 3 HOSP TYPE B ED VISIT: HCPCS

## 2024-04-30 PROCEDURE — 99283 EMERGENCY DEPT VISIT LOW MDM: CPT

## 2024-04-30 NOTE — TELEPHONE ENCOUNTER
Caller: Patient     Doctor:      Reason for call: Patient WC and coming in for appointment on Friday. She has yet to receive claim # but will callback once she receives that.       All other information uploaded into chart     Call back#: [phone]

## 2024-05-02 ENCOUNTER — APPOINTMENT (OUTPATIENT)
Dept: URGENT CARE | Age: 49
End: 2024-05-02
Payer: OTHER MISCELLANEOUS

## 2024-05-02 PROCEDURE — 99213 OFFICE O/P EST LOW 20 MIN: CPT | Performed by: STUDENT IN AN ORGANIZED HEALTH CARE EDUCATION/TRAINING PROGRAM

## 2024-05-09 ENCOUNTER — APPOINTMENT (OUTPATIENT)
Dept: URGENT CARE | Age: 49
End: 2024-05-09
Payer: OTHER MISCELLANEOUS

## 2024-05-09 ENCOUNTER — EVALUATION (OUTPATIENT)
Dept: PHYSICAL THERAPY | Age: 49
End: 2024-05-09
Payer: OTHER MISCELLANEOUS

## 2024-05-09 DIAGNOSIS — S29.019A THORACIC MYOFASCIAL STRAIN, INITIAL ENCOUNTER: Primary | ICD-10-CM

## 2024-05-09 PROCEDURE — 99213 OFFICE O/P EST LOW 20 MIN: CPT | Performed by: STUDENT IN AN ORGANIZED HEALTH CARE EDUCATION/TRAINING PROGRAM

## 2024-05-09 PROCEDURE — 97110 THERAPEUTIC EXERCISES: CPT | Performed by: PHYSICAL THERAPIST

## 2024-05-09 PROCEDURE — 97161 PT EVAL LOW COMPLEX 20 MIN: CPT | Performed by: PHYSICAL THERAPIST

## 2024-05-09 NOTE — PROGRESS NOTES
PT Evaluation     Today's date: 2024  Patient name: Krys Alex  : 1975  MRN: [id number]  Referring provider: Do Mccullough DO  Dx:   Encounter Diagnosis     ICD-10-CM    1. Thoracic myofascial strain, initial encounter  S29.019A                      Assessment  Assessment details: Patient presents with L thoracic strain - presents with decreased AROM, PROM, tenderness to palpation, and pain.  Patient is an excellent candidate for PT intervention.  Impairments: abnormal or restricted ROM, impaired physical strength, lacks appropriate home exercise program and pain with function  Understanding of Dx/Px/POC: good   Prognosis: good    Goals  Short Term goals - 4 weeks  1.  Patient will be independent HEP.   2.  Patient will report a 50% decrease in pain complaints.  3.  Increase strength 1/2 grade.  4.  Increase ROM 5-10 degrees.    Long Term goals - 8 weeks  1.  Patient will report elimination of pain complaints.  2.  Patient will return to all work related activities without restriction.  3.  Patient will return to all recreational activities without restriction.  4.  ROM WFL.  5.  Strength 5/5.    Plan  Planned therapy interventions: strengthening, stretching and manual therapy  Frequency: 2x week  Duration in weeks: 4    Subjective Evaluation    History of Present Illness  Mechanism of injury: Patient presents s/p L sided thoracic strain from injury at work.  Patient was falling backwards in bed.  Current sx's - L lower thoracic/upper lumbar tightness, spasms, and pain.  Sx's are aggravated by static postures and rotational/Sb'ing movements.  MEDS:  Robaxin and ibuprofen.  Patient received a prednisone pack script today - will try and start today.  Patient is currently off work.  RN on med/surg.  Quality of life: excellent    Patient Goals  Patient goals for therapy: decreased pain, increased strength, independence with ADLs/IADLs, increased motion and return to work    Pain  Current pain  ratin  At best pain rating: 3  At worst pain ratin  Quality: tight, throbbing, dull ache and cramping  Progression: no change      Objective     Tenderness     Additional Tenderness Details  Noted in L thoracic/lumbar region.    Active Range of Motion   Cervical/Thoracic Spine       Thoracic    Flexion:  Restriction level: moderate  Extension:  with pain Restriction level: moderate  Left lateral flexion:  Restriction level: minimal  Right lateral flexion:  Restriction level: minimal  Left rotation:  Restriction level: maximal  Right rotation:  with pain Restriction level: maximal    Strength/Myotome Testing     Lumbar   Left   Normal strength  Heel walk: normal  Toe walk: normal    Right   Normal strength  Heel walk: normal  Toe walk: normal    Tests     Lumbar     Left   Negative femoral stretch, passive SLR and slump test.     Right   Negative femoral stretch, passive SLR and slump test.     Left Hip   Negative MIRIAM and FADIR.     Right Hip   Negative MIRIAM and FADIR.            Precautions: None      Manuals                                                                 Neuro Re-Ed                                                                                                        Ther Ex             Review HEP and check progress.                                                                                                        Ther Activity                                       Gait Training                                       Modalities

## 2024-05-13 ENCOUNTER — OFFICE VISIT (OUTPATIENT)
Dept: PHYSICAL THERAPY | Age: 49
End: 2024-05-13
Payer: OTHER MISCELLANEOUS

## 2024-05-13 DIAGNOSIS — S29.019A THORACIC MYOFASCIAL STRAIN, INITIAL ENCOUNTER: Primary | ICD-10-CM

## 2024-05-13 PROCEDURE — 97110 THERAPEUTIC EXERCISES: CPT | Performed by: PHYSICAL THERAPIST

## 2024-05-13 NOTE — PROGRESS NOTES
Daily Note     Today's date: 2024  Patient name: Krys Alex  : 1975  MRN: [id number]  Referring provider: Do Mccullough DO  Dx:   Encounter Diagnosis     ICD-10-CM    1. Thoracic myofascial strain, initial encounter  S29.019A                      Subjective: Overall, patient is not improved with steriod pack and flexion based HEP.  Sx's are L lumbar region and do not feel muscular in nature.  Patient desires further medical evaluation.      Objective: See treatment diary below.  Neg myotomal changes, Neg reflex changes, Neg radicular sx's.  Neg LB and SI testing, Tenderness to palpation in lumbar PVM.  Neg guarding with bed mobility and transfers.  Overall, sitting seems to be the worse aggravating activity.        Assessment: Tolerated treatment well. Patient would benefit from continued PT.  Flexion HEP has failed.  Will trial an extension based program over the next 2 days.  Patient to start with prone on elbows as prone press ups aggravated sx's.      Plan: Continue per plan of care.      Precautions: None      Manuals                                                                 Neuro Re-Ed                                                                                                        Ther Ex             Review HEP and check progress. X15 minutes                         Prone on elbows X10 reps hold 10 sec            Prone press up 2x10                                                                Ther Activity                                       Gait Training                                       Modalities

## 2024-05-15 ENCOUNTER — OFFICE VISIT (OUTPATIENT)
Dept: PHYSICAL THERAPY | Age: 49
End: 2024-05-15
Payer: OTHER MISCELLANEOUS

## 2024-05-15 DIAGNOSIS — S29.019A THORACIC MYOFASCIAL STRAIN, INITIAL ENCOUNTER: Primary | ICD-10-CM

## 2024-05-15 PROCEDURE — 97110 THERAPEUTIC EXERCISES: CPT | Performed by: PHYSICAL THERAPIST

## 2024-05-15 PROCEDURE — 97140 MANUAL THERAPY 1/> REGIONS: CPT | Performed by: PHYSICAL THERAPIST

## 2024-05-15 NOTE — PROGRESS NOTES
Daily Note     Today's date: 5/15/2024  Patient name: Krys Alex  : 1975  MRN: [id number]  Referring provider: Do Mccullough DO  Dx:   Encounter Diagnosis     ICD-10-CM    1. Thoracic myofascial strain, initial encounter  S29.019A                      Subjective: Patient feels modestly better.      Objective: See treatment diary below      Assessment: Tolerated treatment well. Patient would benefit from continued PT      Plan: Continue per plan of care.      Precautions: None      Manuals 5/13 5/15           TM  X8 minutes           PA mobs - lower thoracic and upper lumbar region  Grade III                                     Neuro Re-Ed                                                                                                        Ther Ex             Review HEP and check progress. X15 minutes                         Prone on elbows X10 reps hold 10 sec completed           Prone press up 2x10 completed                        Prone alt   2x10           Prone trunk ext  2x10           Prone quad stretch  X5 reps hold 30 sec                                                                                                      Ther Activity                                       Gait Training                                       Modalities             RPW  2500 pulses - 1.5 bar

## 2024-05-17 ENCOUNTER — OFFICE VISIT (OUTPATIENT)
Dept: PHYSICAL THERAPY | Age: 49
End: 2024-05-17
Payer: OTHER MISCELLANEOUS

## 2024-05-17 DIAGNOSIS — S29.019A THORACIC MYOFASCIAL STRAIN, INITIAL ENCOUNTER: Primary | ICD-10-CM

## 2024-05-17 PROCEDURE — 97110 THERAPEUTIC EXERCISES: CPT | Performed by: PHYSICAL THERAPIST

## 2024-05-17 PROCEDURE — 97140 MANUAL THERAPY 1/> REGIONS: CPT | Performed by: PHYSICAL THERAPIST

## 2024-05-17 NOTE — PROGRESS NOTES
Daily Note     Today's date: 2024  Patient name: Krys Alex  : 1975  MRN: [id number]  Referring provider: Do Mccullough DO  Dx:   Encounter Diagnosis     ICD-10-CM    1. Thoracic myofascial strain, initial encounter  S29.019A                      Subjective: Patient feels primary sx's are now in mid-thoracic region - L sided.      Objective: See treatment diary below.  Trial of traction.       Assessment: Tolerated treatment well. Patient would benefit from continued PT      Plan: Continue per plan of care. Recommend continuation of PT for 2 more weeks prior to RTW.     Precautions: None      Manuals 5/13 5/15 5/17          TM  X8 minutes completed          PA mobs - lower thoracic and upper lumbar region  Grade III [id number]                                    Neuro Re-Ed                                                                                                        Ther Ex             Review HEP and check progress. X15 minutes                         Prone on elbows X10 reps hold 10 sec completed x10          Prone press up 2x10 completed x10                       Prone alt   2x10 nt          Prone trunk ext  2x10 nt          Prone quad stretch  X5 reps hold 30 sec nt                                                                                                     Ther Activity                                       Gait Training                                       Modalities             RPW  2500 pulses - 1.5 bar Completed - thoracic and lumbar region          [employer] traction   60#/15# - 10 minutes intermittent.

## 2024-05-20 ENCOUNTER — APPOINTMENT (OUTPATIENT)
Dept: URGENT CARE | Age: 49
End: 2024-05-20
Payer: OTHER MISCELLANEOUS

## 2024-05-20 ENCOUNTER — OFFICE VISIT (OUTPATIENT)
Dept: PHYSICAL THERAPY | Age: 49
End: 2024-05-20
Payer: OTHER MISCELLANEOUS

## 2024-05-20 DIAGNOSIS — S29.019A THORACIC MYOFASCIAL STRAIN, INITIAL ENCOUNTER: Primary | ICD-10-CM

## 2024-05-20 PROCEDURE — 99213 OFFICE O/P EST LOW 20 MIN: CPT | Performed by: STUDENT IN AN ORGANIZED HEALTH CARE EDUCATION/TRAINING PROGRAM

## 2024-05-20 PROCEDURE — 97110 THERAPEUTIC EXERCISES: CPT | Performed by: PHYSICAL THERAPIST

## 2024-05-20 PROCEDURE — 97140 MANUAL THERAPY 1/> REGIONS: CPT | Performed by: PHYSICAL THERAPIST

## 2024-05-20 NOTE — PROGRESS NOTES
Daily Note     Today's date: 2024  Patient name: Krys Alex  : 1975  MRN: [id number]  Referring provider: Do Mccullough DO  Dx:   Encounter Diagnosis     ICD-10-CM    1. Thoracic myofascial strain, initial encounter  S29.019A                      Subjective: Patient continues to c/o stiffness and intermittent spasms      Objective: See treatment diary below      Assessment: Tolerated treatment well. Patient would benefit from continued PT      Plan: Continue per plan of care.      Precautions: None      Manuals 5/13 5/15 5/17 5/20         TM  X8 minutes completed X10 minutes         PA mobs - lower thoracic and upper lumbar region  Grade III [id number] Completed                                    Neuro Re-Ed                                                                                                        Ther Ex             Review HEP and check progress. X15 minutes                         Prone on elbows X10 reps hold 10 sec completed x10 x10         Prone press up 2x10 completed x10 x10                      Prone alt   2x10 nt 2x10         Prone trunk ext  2x10 nt 2x10         Prone quad stretch  X5 reps hold 30 sec nt nt                      Physioball abd crunch    2x10         Lower abd with hip add ball squeeze    2x10                                                             Ther Activity                                       Gait Training                                       Modalities             RPW  2500 pulses - 1.5 bar Completed - thoracic and lumbar region completed         [employer] traction   60#/15# - 10 minutes intermittent. 75#/25# - x15 minutes

## 2024-05-22 ENCOUNTER — OFFICE VISIT (OUTPATIENT)
Dept: PHYSICAL THERAPY | Age: 49
End: 2024-05-22
Payer: OTHER MISCELLANEOUS

## 2024-05-22 DIAGNOSIS — S29.019A THORACIC MYOFASCIAL STRAIN, INITIAL ENCOUNTER: Primary | ICD-10-CM

## 2024-05-22 PROCEDURE — 97140 MANUAL THERAPY 1/> REGIONS: CPT | Performed by: PHYSICAL THERAPIST

## 2024-05-22 PROCEDURE — 97110 THERAPEUTIC EXERCISES: CPT | Performed by: PHYSICAL THERAPIST

## 2024-05-22 NOTE — PROGRESS NOTES
Daily Note     Today's date: 2024  Patient name: Krys Alex  : 1975  MRN: [id number]  Referring provider: Do Mccullough DO  Dx:   Encounter Diagnosis     ICD-10-CM    1. Thoracic myofascial strain, initial encounter  S29.019A                      Subjective: Patient notes some modest gains.      Objective: See treatment diary below.  Increased thoracic mobility noted.      Assessment: Tolerated treatment well. Patient would benefit from continued PT      Plan: Continue per plan of care.      Precautions: None      Manuals 5/13 5/15 5/17 5/20 5/22        TM  X8 minutes completed X10 minutes X10 minutes        PA mobs - lower thoracic and upper lumbar region  Grade III [id number] Completed  completed                                  Neuro Re-Ed                                                                                                        Ther Ex             Review HEP and check progress. X15 minutes                         Prone on elbows X10 reps hold 10 sec completed x10 x10 nt        Prone press up 2x10 completed x10 x10 2x10                     Prone alt   2x10 nt 2x10 2x10        Prone trunk ext  2x10 nt 2x10 2x10        Prone quad stretch  X5 reps hold 30 sec nt nt nt                     Physioball abd crunch    2x10 2x10        Lower abd with hip add ball squeeze    2x10 2x10                                                            Ther Activity                                       Gait Training                                       Modalities             RPW  2500 pulses - 1.5 bar Completed - thoracic and lumbar region completed completed        [employer] traction   60#/15# - 10 minutes intermittent. 75#/25# - x15 minutes 80#/25# - x15 minutes.  Supine

## 2024-05-28 ENCOUNTER — OFFICE VISIT (OUTPATIENT)
Dept: PHYSICAL THERAPY | Age: 49
End: 2024-05-28
Payer: OTHER MISCELLANEOUS

## 2024-05-28 DIAGNOSIS — S29.019A THORACIC MYOFASCIAL STRAIN, INITIAL ENCOUNTER: Primary | ICD-10-CM

## 2024-05-28 PROCEDURE — 97110 THERAPEUTIC EXERCISES: CPT | Performed by: PHYSICAL THERAPIST

## 2024-05-28 PROCEDURE — 97140 MANUAL THERAPY 1/> REGIONS: CPT | Performed by: PHYSICAL THERAPIST

## 2024-05-28 NOTE — PROGRESS NOTES
Daily Note     Today's date: 2024  Patient name: Krys Alex  : 1975  MRN: [id number]  Referring provider: Do Mccullough DO  Dx:   Encounter Diagnosis     ICD-10-CM    1. Thoracic myofascial strain, initial encounter  S29.019A                      Subjective: Patient had good days on Sat and Sun..  Patient to f/u with MD on Friday - possible RTW the second week of .      Objective: See treatment diary below      Assessment: Tolerated treatment well. Patient would benefit from continued PT      Plan: Continue per plan of care.      Precautions: None      Manuals 5/13 5/15 5/17 5/20 5/22 5/28       TM  X8 minutes completed X10 minutes X10 minutes X10 minutes       PA mobs - lower thoracic and upper lumbar region  Grade III [id number] Completed  completed completed                                 Neuro Re-Ed                                                                                                        Ther Ex             Review HEP and check progress. X15 minutes                         Prone on elbows X10 reps hold 10 sec completed x10 x10 nt        Prone press up 2x10 completed x10 x10 2x10 3x10                    Prone alt   2x10 nt 2x10 2x10 3x10       Prone trunk ext  2x10 nt 2x10 2x10 3x10       Prone quad stretch  X5 reps hold 30 sec nt nt nt nt                    Physioball abd crunch    2x10 2x10 3x10       Lower abd with hip add ball squeeze    2x10 2x10 3x10                                                           Ther Activity                                       Gait Training                                       Modalities             RPW  2500 pulses - 1.5 bar Completed - thoracic and lumbar region completed completed completed       [employer] traction   60#/15# - 10 minutes intermittent. 75#/25# - x15 minutes 80#/25# - x15 minutes.  Supine 80#/20# - x15 minutes

## 2024-05-30 ENCOUNTER — OFFICE VISIT (OUTPATIENT)
Dept: PHYSICAL THERAPY | Age: 49
End: 2024-05-30
Payer: OTHER MISCELLANEOUS

## 2024-05-30 DIAGNOSIS — S29.019A THORACIC MYOFASCIAL STRAIN, INITIAL ENCOUNTER: Primary | ICD-10-CM

## 2024-05-30 PROCEDURE — 97140 MANUAL THERAPY 1/> REGIONS: CPT | Performed by: PHYSICAL THERAPIST

## 2024-05-30 PROCEDURE — 97110 THERAPEUTIC EXERCISES: CPT | Performed by: PHYSICAL THERAPIST

## 2024-05-30 NOTE — PROGRESS NOTES
Daily Note     Today's date: 2024  Patient name: Krys Alex  : 1975  MRN: [id number]  Referring provider: Do Mccullough DO  Dx:   Encounter Diagnosis     ICD-10-CM    1. Thoracic myofascial strain, initial encounter  S29.019A                      Subjective: Patient reports increased sx's in cervical musc and thoracic region - feels abd work with physioball aggravated cervical spine and leobardo core work aggravated thoracic region.      Objective: See treatment diary below.  Movements patterns are WNL.  Prone press ups more restricted today.      Assessment: Tolerated treatment well. Patient would benefit from continued PT      Plan: Continue per MD recommendations.  I recommend transitional RTW if available.     Precautions: None      Manuals 5/13 5/15 5/17 5/20 5/22 5/28 5/30      TM  X8 minutes completed X10 minutes X10 minutes X10 minutes X10 minutes      PA mobs - lower thoracic and upper lumbar region  Grade III [id number] Completed  completed completed completed                                Neuro Re-Ed                                                                                                        Ther Ex             Review HEP and check progress. X15 minutes                         Prone on elbows X10 reps hold 10 sec completed x10 x10 nt  nt      Prone press up 2x10 completed x10 x10 2x10 3x10 3x10                   Prone alt   2x10 nt 2x10 2x10 3x10 3x10      Prone trunk ext  2x10 nt 2x10 2x10 3x10 3x10      Prone quad stretch  X5 reps hold 30 sec nt nt nt nt nt                   Physioball abd crunch    2x10 2x10 3x10 3x10      Lower abd with hip add ball squeeze    2x10 2x10 3x10 3x10                                                          Ther Activity                                       Gait Training                                       Modalities             RPW  2500 pulses - 1.5 bar Completed - thoracic and lumbar region completed completed completed        [employer] traction   60#/15# - 10 minutes intermittent. 75#/25# - x15 minutes 80#/25# - x15 minutes.  Supine 80#/20# - x15 minutes

## 2024-05-31 ENCOUNTER — APPOINTMENT (OUTPATIENT)
Dept: URGENT CARE | Age: 49
End: 2024-05-31
Payer: OTHER MISCELLANEOUS

## 2024-05-31 PROCEDURE — 99214 OFFICE O/P EST MOD 30 MIN: CPT | Performed by: PHYSICIAN ASSISTANT

## 2024-06-04 ENCOUNTER — OFFICE VISIT (OUTPATIENT)
Dept: PHYSICAL THERAPY | Age: 49
End: 2024-06-04
Payer: OTHER MISCELLANEOUS

## 2024-06-04 DIAGNOSIS — S29.019A THORACIC MYOFASCIAL STRAIN, INITIAL ENCOUNTER: Primary | ICD-10-CM

## 2024-06-04 PROCEDURE — 97140 MANUAL THERAPY 1/> REGIONS: CPT | Performed by: PHYSICAL THERAPIST

## 2024-06-04 PROCEDURE — 97110 THERAPEUTIC EXERCISES: CPT | Performed by: PHYSICAL THERAPIST

## 2024-06-05 NOTE — PROGRESS NOTES
Daily Note     Today's date: 2024  Patient name: Krys Alex  : 1975  MRN: [id number]  Referring provider: Do Mccullough DO  Dx:   Encounter Diagnosis     ICD-10-CM    1. Thoracic myofascial strain, initial encounter  S29.019A                      Subjective: Patient still feel alignment is off.        Objective: See treatment diary below.  Supine to sit is positive today - L post innominate rotation noted.        Assessment: Tolerated treatment well. Patient would benefit from continued PT.  Trial of SI treatment and HEP initiated today.      Plan: Continue per plan of care.      Precautions: None      Manuals 5/13 5/15 5/17 5/20 5/22 5/28 5/30 6/5     TM  X8 minutes completed X10 minutes X10 minutes X10 minutes X10 minutes nt     PA mobs - lower thoracic and upper lumbar region  Grade III [id number] Completed  completed completed completed nt     L LE distraction        X15 mintues     MET L hip flexion/R hip ext        completed     Neuro Re-Ed                                                                                                        Ther Ex             Review HEP and check progress. X15 minutes                         Prone on elbows X10 reps hold 10 sec completed x10 x10 nt  nt nt     Prone press up 2x10 completed x10 x10 2x10 3x10 3x10 nt                  Prone alt   2x10 nt 2x10 2x10 3x10 3x10 nt     Prone trunk ext  2x10 nt 2x10 2x10 3x10 3x10 nt     Prone quad stretch  X5 reps hold 30 sec nt nt nt nt nt nt                  Physioball abd crunch    2x10 2x10 3x10 3x10 nt     Lower abd with hip add ball squeeze    2x10 2x10 3x10 3x10 nt                  L SLR flexion        3x10     L long sitting hamstring stretch        X5 reps                  R single bridge        3x10     R hip flexor stretch        X5 reps                  Ther Activity                                       Gait Training                                       Modalities             RPW  2500 pulses -  1.5 bar Completed - thoracic and lumbar region completed completed completed  nt     [employer] traction   60#/15# - 10 minutes intermittent. 75#/25# - x15 minutes 80#/25# - x15 minutes.  Supine 80#/20# - x15 minutes  nt

## 2024-06-06 ENCOUNTER — OFFICE VISIT (OUTPATIENT)
Dept: PHYSICAL THERAPY | Age: 49
End: 2024-06-06
Payer: OTHER MISCELLANEOUS

## 2024-06-06 DIAGNOSIS — S29.019A THORACIC MYOFASCIAL STRAIN, INITIAL ENCOUNTER: Primary | ICD-10-CM

## 2024-06-06 PROCEDURE — 97140 MANUAL THERAPY 1/> REGIONS: CPT | Performed by: PHYSICAL THERAPIST

## 2024-06-06 PROCEDURE — 97110 THERAPEUTIC EXERCISES: CPT | Performed by: PHYSICAL THERAPIST

## 2024-06-06 NOTE — PROGRESS NOTES
Daily Note     Today's date: 2024  Patient name: Krys Alex  : 1975  MRN: [id number]  Referring provider: Do Mccullough DO  Dx:   Encounter Diagnosis     ICD-10-CM    1. Thoracic myofascial strain, initial encounter  S29.019A                      Subjective: L thoracic sx's persist      Objective: See treatment diary below.  SI dysfunction is staying corrected.      Assessment: Tolerated treatment well. Patient would benefit from continued PT      Plan: Continue per plan of care.      Precautions: None      Manuals 5/13 5/15 5/17 5/20 5/22 5/28 5/30 6/5 6/6    TM  X8 minutes completed X10 minutes X10 minutes X10 minutes X10 minutes nt nt    PA mobs - lower thoracic and upper lumbar region  Grade III [id number] Completed  completed completed completed nt     L LE distraction        X15 mintues X15 minutes    MET L hip flexion/R hip ext        completed completed    Neuro Re-Ed                                                                                                        Ther Ex             Review HEP and check progress. X15 minutes                         Prone on elbows X10 reps hold 10 sec completed x10 x10 nt  nt nt     Prone press up 2x10 completed x10 x10 2x10 3x10 3x10 nt                  Prone alt   2x10 nt 2x10 2x10 3x10 3x10 nt     Prone trunk ext  2x10 nt 2x10 2x10 3x10 3x10 nt     Prone quad stretch  X5 reps hold 30 sec nt nt nt nt nt nt                  Physioball abd crunch    2x10 2x10 3x10 3x10 nt     Lower abd with hip add ball squeeze    2x10 2x10 3x10 3x10 nt                  L SLR flexion        3x10 2# - 3x10    L long sitting hamstring stretch        X5 reps X5 reps                 R single bridge        3x10 3x10    R hip flexor stretch        X5 reps X5 reps                 Ther Activity                                       Gait Training                                       Modalities             RPW  2500 pulses - 1.5 bar Completed - thoracic and lumbar region  completed completed completed  nt Completed - 2500 pulses. 4.0 bar to L mid thoracic and lower lumbar region.    [employer] traction   60#/15# - 10 minutes intermittent. 75#/25# - x15 minutes 80#/25# - x15 minutes.  Supine 80#/20# - x15 minutes  nt

## 2024-06-10 DIAGNOSIS — N30.01 ACUTE CYSTITIS WITH HEMATURIA: ICD-10-CM

## 2024-06-10 DIAGNOSIS — G43.909 MIGRAINE HEADACHE: ICD-10-CM

## 2024-06-11 ENCOUNTER — OFFICE VISIT (OUTPATIENT)
Dept: PHYSICAL THERAPY | Age: 49
End: 2024-06-11
Payer: OTHER MISCELLANEOUS

## 2024-06-11 DIAGNOSIS — S29.019A THORACIC MYOFASCIAL STRAIN, INITIAL ENCOUNTER: Primary | ICD-10-CM

## 2024-06-11 PROCEDURE — 97110 THERAPEUTIC EXERCISES: CPT | Performed by: PHYSICAL THERAPIST

## 2024-06-11 RX ORDER — BUTALBITAL, ACETAMINOPHEN AND CAFFEINE 50; 325; 40 MG/1; MG/1; MG/1
1 TABLET ORAL EVERY 6 HOURS PRN
Qty: 15 TABLET | Refills: 0 | Status: SHIPPED | OUTPATIENT
Start: 2024-06-11

## 2024-06-11 NOTE — PROGRESS NOTES
Daily Note     Today's date: 2024  Patient name: Krys Alex  : 1975  MRN: [id number]  Referring provider: Do Mccullough DO  Dx:   Encounter Diagnosis     ICD-10-CM    1. Thoracic myofascial strain, initial encounter  S29.019A                      Subjective: Patient continues to feel like something is wrong/out of alignment.  Patient unable to finish session secondary to vertigo episode.      Objective: See treatment diary below.        Assessment: Tolerated treatment fair.       Plan: Recommend D/C PT and trial of RTW as patient has reached max benefit from PT intervention.     Precautions: None      Manuals 5/13 5/15 5/17 5/20 5/22 5/28 5/30 6/5 6/6 6/11   TM  X8 minutes completed X10 minutes X10 minutes X10 minutes X10 minutes nt nt X10 minutes   PA mobs - lower thoracic and upper lumbar region  Grade III [id number] Completed  completed completed completed nt  nt   L LE distraction        X15 mintues X15 minutes Pelvis is symmetrical  - nt   MET L hip flexion/R hip ext        completed completed nt   Neuro Re-Ed                                                                                                        Ther Ex             Review HEP and check progress. X15 minutes                         Prone on elbows X10 reps hold 10 sec completed x10 x10 nt  nt nt     Prone press up 2x10 completed x10 x10 2x10 3x10 3x10 nt                  Prone alt   2x10 nt 2x10 2x10 3x10 3x10 nt     Prone trunk ext  2x10 nt 2x10 2x10 3x10 3x10 nt     Prone quad stretch  X5 reps hold 30 sec nt nt nt nt nt nt     Physioball bridging          3x10   Physioball abd crunch    2x10 2x10 3x10 3x10 nt     Lower abd with hip add ball squeeze    2x10 2x10 3x10 3x10 nt  3x10                L SLR flexion        3x10 2# - 3x10 nt   L long sitting hamstring stretch        X5 reps X5 reps nt                R single bridge        3x10 3x10 nt   R hip flexor stretch        X5 reps X5 reps nt                Ther Activity                                        Gait Training                                       Modalities             RPW  2500 pulses - 1.5 bar Completed - thoracic and lumbar region completed completed completed  nt Completed - 2500 pulses. 4.0 bar to L mid thoracic and lower lumbar region. nt   [employer] traction   60#/15# - 10 minutes intermittent. 75#/25# - x15 minutes 80#/25# - x15 minutes.  Supine 80#/20# - x15 minutes  nt  nt

## 2024-06-12 ENCOUNTER — APPOINTMENT (OUTPATIENT)
Dept: URGENT CARE | Age: 49
End: 2024-06-12
Payer: OTHER MISCELLANEOUS

## 2024-06-12 PROCEDURE — 99214 OFFICE O/P EST MOD 30 MIN: CPT | Performed by: PHYSICIAN ASSISTANT

## 2024-06-13 ENCOUNTER — APPOINTMENT (OUTPATIENT)
Dept: PHYSICAL THERAPY | Age: 49
End: 2024-06-13
Payer: OTHER MISCELLANEOUS

## 2024-06-13 ENCOUNTER — HOSPITAL ENCOUNTER (OUTPATIENT)
Dept: RADIOLOGY | Facility: IMAGING CENTER | Age: 49
End: 2024-06-13
Payer: OTHER MISCELLANEOUS

## 2024-06-13 DIAGNOSIS — S29.012D STRAIN OF LATISSIMUS DORSI MUSCLE, SUBSEQUENT ENCOUNTER: ICD-10-CM

## 2024-06-13 DIAGNOSIS — S39.012D LUMBAR SPINE STRAIN, SUBSEQUENT ENCOUNTER: ICD-10-CM

## 2024-06-13 PROCEDURE — 72148 MRI LUMBAR SPINE W/O DYE: CPT

## 2024-06-13 PROCEDURE — 72146 MRI CHEST SPINE W/O DYE: CPT

## 2024-06-20 ENCOUNTER — OFFICE VISIT (OUTPATIENT)
Dept: PHYSICAL THERAPY | Age: 49
End: 2024-06-20
Payer: OTHER MISCELLANEOUS

## 2024-06-20 DIAGNOSIS — S29.019A THORACIC MYOFASCIAL STRAIN, INITIAL ENCOUNTER: Primary | ICD-10-CM

## 2024-06-20 PROCEDURE — 97110 THERAPEUTIC EXERCISES: CPT | Performed by: PHYSICAL THERAPIST

## 2024-06-20 PROCEDURE — 97140 MANUAL THERAPY 1/> REGIONS: CPT | Performed by: PHYSICAL THERAPIST

## 2024-06-20 NOTE — PROGRESS NOTES
Daily Note     Today's date: 2024  Patient name: Krys Alex  : 1975  MRN: [id number]  Referring provider: Do cMcullough DO  Dx:   Encounter Diagnosis     ICD-10-CM    1. Thoracic myofascial strain, initial encounter  S29.019A                      Subjective: Patient continues to have constant L thoracic symptoms - T7 region.  Patient feels it is an alignment issue.      Objective: See treatment diary below.  MRI performed - not read at this point.       Assessment: Tolerated treatment well. Patient would benefit from continued PT      Plan: Continue per plan of care.      Precautions: None      Manuals 6/20 5/15 5/17 5/20 5/22 5/28 5/30 6/5 6/6 6/11   TM X10 minutes X8 minutes completed X10 minutes X10 minutes X10 minutes X10 minutes nt nt X10 minutes   PA mobs - lower thoracic and upper lumbar region nt Grade III [id number] Completed  completed completed completed nt  nt   L LE distraction nt       X15 mintues X15 minutes Pelvis is symmetrical  - nt   MET L hip flexion/R hip ext nt       completed completed nt   Neuro Re-Ed                                                                                                        Ther Ex             Corner stretch  X5 reps hold 20 sec            Lower trap stretch X5 reps hold 20 sec            Physioball - trunk flexion stretch X5 reps hold 20 sec                         Prone horiz abd with ER 2x10 - 2#            Prone Y 2x10 - 1#            Pinon b/l shld ER 2x10            Pinon b/l shld ext 2x10                                      Prone alt  nt 2x10 nt 2x10 2x10 3x10 3x10 nt     Prone trunk ext nt 2x10 nt 2x10 2x10 3x10 3x10 nt     Prone quad stretch nt X5 reps hold 30 sec nt nt nt nt nt nt     Physioball bridging nt         3x10   Physioball abd crunch nt   2x10 2x10 3x10 3x10 nt     Lower abd with hip add ball squeeze nt   2x10 2x10 3x10 3x10 nt  3x10                                                                                               Ther Activity                                       Gait Training                                       Modalities             RPW Completed - 2500 pulses.  40. Bar - to L thoracic region. 2500 pulses - 1.5 bar Completed - thoracic and lumbar region completed completed completed  nt Completed - 2500 pulses. 4.0 bar to L mid thoracic and lower lumbar region. nt   Mechanica traction   60#/15# - 10 minutes intermittent. 75#/25# - x15 minutes 80#/25# - x15 minutes.  Supine 80#/20# - x15 minutes  nt  nt

## 2024-06-24 ENCOUNTER — OFFICE VISIT (OUTPATIENT)
Dept: PHYSICAL THERAPY | Age: 49
End: 2024-06-24
Payer: OTHER MISCELLANEOUS

## 2024-06-24 DIAGNOSIS — S29.019A THORACIC MYOFASCIAL STRAIN, INITIAL ENCOUNTER: Primary | ICD-10-CM

## 2024-06-24 PROCEDURE — 97110 THERAPEUTIC EXERCISES: CPT | Performed by: PHYSICAL THERAPIST

## 2024-06-24 NOTE — PROGRESS NOTES
Daily Note     Today's date: 2024  Patient name: Krys Alex  : 1975  MRN: [id number]  Referring provider: Do Mccullough DO  Dx:   Encounter Diagnosis     ICD-10-CM    1. Thoracic myofascial strain, initial encounter  S29.019A                      Subjective: MRI's read - patient feels that findings correlate with sx's.      Objective: See treatment diary below.  Continue with core strengthening.      Assessment: Tolerated treatment well. Patient would benefit from continued PT.  Non-compressive changes on MRI's.      Plan: Continue per plan of care.      Precautions: None      Manuals    TM X10 minutes X8 minutes completed X10 minutes X10 minutes X10 minutes X10 minutes nt nt X10 minutes   PA mobs - lower thoracic and upper lumbar region nt Grade III [id number] Completed  completed completed completed nt  nt   L LE distraction nt       X15 mintues X15 minutes Pelvis is symmetrical  - nt   MET L hip flexion/R hip ext nt       completed completed nt   Neuro Re-Ed                                                                                                        Ther Ex             Corner stretch  X5 reps hold 20 sec x5           Lower trap stretch X5 reps hold 20 sec x5           Physioball - trunk flexion stretch X5 reps hold 20 sec nt                        Prone horiz abd with ER 2x10 - 2# 2x10           Prone Y 2x10 - 1# 0# - 2x10           Turrell b/l shld ER 2x10 3x10           Noy b/l shld ext 2x10 3x10                                     Prone alt  nt  nt 2x10 2x10 3x10 3x10 nt     Prone trunk ext nt  nt 2x10 2x10 3x10 3x10 nt     Prone quad stretch nt nt nt nt nt nt nt nt     Physioball bridging nt 2x10        3x10   Physioball abd crunch nt   2x10 2x10 3x10 3x10 nt     Lower abd with hip add ball squeeze nt 2x10  2x10 2x10 3x10 3x10 nt  3x10                                                                                               Ther Activity                                       Gait Training                                       Modalities             RPW Completed - 2500 pulses.  40. Bar - to L thoracic region. nt Completed - thoracic and lumbar region completed completed completed  nt Completed - 2500 pulses. 4.0 bar to L mid thoracic and lower lumbar region. nt   Mechanica traction   60#/15# - 10 minutes intermittent. 75#/25# - x15 minutes 80#/25# - x15 minutes.  Supine 80#/20# - x15 minutes  nt  nt

## 2024-06-26 ENCOUNTER — TELEPHONE (OUTPATIENT)
Age: 49
End: 2024-06-26

## 2024-06-26 ENCOUNTER — APPOINTMENT (OUTPATIENT)
Dept: URGENT CARE | Age: 49
End: 2024-06-26
Payer: OTHER MISCELLANEOUS

## 2024-06-26 PROCEDURE — 99214 OFFICE O/P EST MOD 30 MIN: CPT | Performed by: PHYSICIAN ASSISTANT

## 2024-06-26 NOTE — TELEPHONE ENCOUNTER
Krys from [facility] called stating she will be faxing referral and records over please melissa patient to schedule once received. Thank you

## 2024-06-27 ENCOUNTER — OFFICE VISIT (OUTPATIENT)
Dept: PHYSICAL THERAPY | Age: 49
End: 2024-06-27
Payer: OTHER MISCELLANEOUS

## 2024-06-27 DIAGNOSIS — S29.019A THORACIC MYOFASCIAL STRAIN, INITIAL ENCOUNTER: Primary | ICD-10-CM

## 2024-06-27 PROCEDURE — 97110 THERAPEUTIC EXERCISES: CPT | Performed by: PHYSICAL THERAPIST

## 2024-06-27 NOTE — PROGRESS NOTES
Daily Note     Today's date: 2024  Patient name: Krys Alex  : 1975  MRN: [id number]  Referring provider: Do Mccullough DO  Dx:   Encounter Diagnosis     ICD-10-CM    1. Thoracic myofascial strain, initial encounter  S29.019A                      Subjective: Patient desires to work on posture - feels like she is getting pulled forward.      Objective: See treatment diary below      Assessment: Tolerated treatment well. Patient would benefit from continued PT      Plan: Continue per plan of care.      Precautions: None      Manuals    TM X10 minutes X8 minutes completed X10 minutes X10 minutes X10 minutes X10 minutes nt nt X10 minutes   PA mobs - lower thoracic and upper lumbar region nt Grade III nt Completed  completed completed completed nt  nt   L LE distraction nt       X15 mintues X15 minutes Pelvis is symmetrical  - nt   MET L hip flexion/R hip ext nt  nt     completed completed nt   Neuro Re-Ed                                                                                                        Ther Ex             Corner stretch  X5 reps hold 20 sec x5 x5          Lower trap stretch X5 reps hold 20 sec x5 x5          Lying on roll   X3 min          Lying on roll - shoulder flexion, fly movement   X3 min, 2x10          Prone horiz abd with ER 2x10 - 2# 2x10 2# - 2x10          Prone Y 2x10 - 1# 0# - 2x10 2# - 2x10          Noy b/l shld ER 2x10 3x10 3x10          Noy b/l shld ext 2x10 3x10 3x10                                    Prone alt  nt  nt 2x10 2x10 3x10 3x10 nt     Prone trunk ext nt  nt 2x10 2x10 3x10 3x10 nt     Prone quad stretch nt nt nt nt nt nt nt nt     Physioball bridging nt 2x10        3x10   Physioball abd crunch nt   2x10 2x10 3x10 3x10 nt     Lower abd with hip add ball squeeze nt 2x10  2x10 2x10 3x10 3x10 nt  3x10                                                                                              Ther  Activity                                       Gait Training                                       Modalities             RPW Completed - 2500 pulses.  40. Bar - to L thoracic region. nt Completed - thoracic and lumbar region completed completed completed  nt Completed - 2500 pulses. 4.0 bar to L mid thoracic and lower lumbar region. nt   Mechanica traction   60#/15# - 10 minutes intermittent. 75#/25# - x15 minutes 80#/25# - x15 minutes.  Supine 80#/20# - x15 minutes  nt  nt

## 2024-07-03 ENCOUNTER — APPOINTMENT (OUTPATIENT)
Dept: PHYSICAL THERAPY | Age: 49
End: 2024-07-03
Payer: OTHER MISCELLANEOUS

## 2024-07-08 ENCOUNTER — OFFICE VISIT (OUTPATIENT)
Dept: PHYSICAL THERAPY | Age: 49
End: 2024-07-08
Payer: OTHER MISCELLANEOUS

## 2024-07-08 DIAGNOSIS — S29.019A THORACIC MYOFASCIAL STRAIN, INITIAL ENCOUNTER: Primary | ICD-10-CM

## 2024-07-08 PROCEDURE — 97140 MANUAL THERAPY 1/> REGIONS: CPT | Performed by: PHYSICAL THERAPIST

## 2024-07-08 PROCEDURE — 97110 THERAPEUTIC EXERCISES: CPT | Performed by: PHYSICAL THERAPIST

## 2024-07-08 NOTE — PROGRESS NOTES
"Daily Note     Today's date: 2024  Patient name: Krys Alex  : 1975  MRN: [id number]  Referring provider: Do Mccullough DO  Dx:   Encounter Diagnosis     ICD-10-CM    1. Thoracic myofascial strain, initial encounter  S29.019A           Start Time: 1500  Stop Time: 1548  Total time in clinic (min): 48 minutes    Subjective: Patient reports that she took her kids to [facility] a few days ago, which caused severe L sided glute pain that hasn't fully resolved. Her hips feel \"out of place.\" She expresses frustration with longevity of symptoms and lack of progress she feels that she is making.       Objective: See treatment diary below    Standing flexion: POS R SIJ hypomobility  Palpation: R ASIS/iliac crest/PSIS elevated compared to L  Supine to sit:[right upslip that was palpated in stance was corrected prior to testing] (1) POS R AI -> gr 5 correction (2) neg  Gait: inc lateral excursion/hip sway; dec pain + dec sway with cue to activate ipsilateral glute through stance    Assessment: Based on pt report, SIJ was assessed and treated per findings, which did provide some relief for patient. Time constraint did not allow for initiation of basic/non-provocative TVA activation program but this should be prioritized in a future session. Pt education regarding possible use of SIJ belt as a good short term option to provide external stability to the force closure joint. In addition, patient referred to therapist who specializes in pelvic health to do more comprehensive exam and identify other possible factors contributing to patient's complex presentation.       Plan: Allow for specialty PT to dictate POC moving forward.      Precautions: None      Manuals  7/ 6 6 6   TM X10 minutes X8 minutes completed  X10 minutes X10 minutes X10 minutes nt nt X10 minutes   PA mobs - lower thoracic and upper lumbar region nt Grade III nt  completed completed completed nt  nt   L LE " distraction nt       X15 mintues X15 minutes Pelvis is symmetrical  - nt   MET L hip flexion/R hip ext nt  nt     completed completed nt   Assess/treat SIJ    Gr 5 R LAD; gr 5 R AI mob         Neuro Re-Ed                                                                                                        Ther Ex             Corner stretch  X5 reps hold 20 sec x5 x5          Lower trap stretch X5 reps hold 20 sec x5 x5          Lying on roll   X3 min          Lying on roll - shoulder flexion, fly movement   X3 min, 2x10          Prone horiz abd with ER 2x10 - 2# 2x10 2# - 2x10          Prone Y 2x10 - 1# 0# - 2x10 2# - 2x10          Jefferson b/l shld ER 2x10 3x10 3x10          Jefferson b/l shld ext 2x10 3x10 3x10                                    Prone alt  nt  nt  2x10 3x10 3x10 nt     Prone trunk ext nt  nt  2x10 3x10 3x10 nt     Prone quad stretch nt nt nt  nt nt nt nt     Physioball bridging nt 2x10        3x10   Physioball abd crunch nt    2x10 3x10 3x10 nt     Lower abd with hip add ball squeeze nt 2x10   2x10 3x10 3x10 nt  3x10                Pt education    25'                                                                          Ther Activity                                       Gait Training                                       Modalities             RPW Completed - 2500 pulses.  40. Bar - to L thoracic region. nt Completed - thoracic and lumbar region  completed completed  nt Completed - 2500 pulses. 4.0 bar to L mid thoracic and lower lumbar region. nt   Mechanica traction   60#/15# - 10 minutes intermittent.  80#/25# - x15 minutes.  Supine 80#/20# - x15 minutes  nt  nt

## 2024-07-09 ENCOUNTER — EVALUATION (OUTPATIENT)
Dept: PHYSICAL THERAPY | Facility: CLINIC | Age: 49
End: 2024-07-09
Payer: OTHER MISCELLANEOUS

## 2024-07-09 DIAGNOSIS — R10.2 PAIN OF PELVIC GIRDLE: ICD-10-CM

## 2024-07-09 DIAGNOSIS — M54.16 LUMBAR RADICULOPATHY: ICD-10-CM

## 2024-07-09 DIAGNOSIS — S29.019D THORACIC MYOFASCIAL STRAIN, SUBSEQUENT ENCOUNTER: Primary | ICD-10-CM

## 2024-07-09 PROCEDURE — 97112 NEUROMUSCULAR REEDUCATION: CPT

## 2024-07-09 PROCEDURE — 97162 PT EVAL MOD COMPLEX 30 MIN: CPT
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
## 2024-07-10 ENCOUNTER — OFFICE VISIT (OUTPATIENT)
Age: 49
End: 2024-07-10
Payer: OTHER MISCELLANEOUS

## 2024-07-10 ENCOUNTER — TELEPHONE (OUTPATIENT)
Age: 49
End: 2024-07-10

## 2024-07-10 ENCOUNTER — CONSULT (OUTPATIENT)
Dept: PAIN MEDICINE | Facility: CLINIC | Age: 49
End: 2024-07-10

## 2024-07-10 ENCOUNTER — APPOINTMENT (OUTPATIENT)
Dept: PHYSICAL THERAPY | Age: 49
End: 2024-07-10
Payer: OTHER MISCELLANEOUS

## 2024-07-10 VITALS
DIASTOLIC BLOOD PRESSURE: 88 MMHG | WEIGHT: 160 LBS | HEART RATE: 87 BPM | BODY MASS INDEX: 25.71 KG/M2 | TEMPERATURE: 98.6 F | SYSTOLIC BLOOD PRESSURE: 146 MMHG | HEIGHT: 66 IN

## 2024-07-10 VITALS
WEIGHT: 160 LBS | SYSTOLIC BLOOD PRESSURE: 128 MMHG | DIASTOLIC BLOOD PRESSURE: 84 MMHG | HEART RATE: 85 BPM | HEIGHT: 66 IN | BODY MASS INDEX: 25.71 KG/M2

## 2024-07-10 DIAGNOSIS — M46.1 SACROILIITIS (HCC): ICD-10-CM

## 2024-07-10 DIAGNOSIS — S39.012A LUMBAR STRAIN, INITIAL ENCOUNTER: Primary | ICD-10-CM

## 2024-07-10 DIAGNOSIS — M99.02 SEGMENTAL DYSFUNCTION OF THORACIC REGION: ICD-10-CM

## 2024-07-10 DIAGNOSIS — M99.04 SEGMENTAL DYSFUNCTION OF SACRAL REGION: ICD-10-CM

## 2024-07-10 DIAGNOSIS — M24.552 HIP FLEXOR TENDON TIGHTNESS, LEFT: ICD-10-CM

## 2024-07-10 DIAGNOSIS — M79.18 MYOFASCIAL PAIN SYNDROME: Primary | ICD-10-CM

## 2024-07-10 DIAGNOSIS — M99.08 SOMATIC DYSFUNCTION OF COSTOVERTEBRAL JOINT STRUCTURE: ICD-10-CM

## 2024-07-10 DIAGNOSIS — M51.36 DDD (DEGENERATIVE DISC DISEASE), LUMBAR: ICD-10-CM

## 2024-07-10 DIAGNOSIS — M79.18 MYOFASCIAL PAIN SYNDROME: ICD-10-CM

## 2024-07-10 PROCEDURE — 97110 THERAPEUTIC EXERCISES: CPT | Performed by: CHIROPRACTOR

## 2024-07-10 PROCEDURE — 99205 OFFICE O/P NEW HI 60 MIN: CPT | Performed by: ANESTHESIOLOGY

## 2024-07-10 PROCEDURE — 99243 OFF/OP CNSLTJ NEW/EST LOW 30: CPT | Performed by: CHIROPRACTOR

## 2024-07-10 PROCEDURE — 98941 CHIROPRACT MANJ 3-4 REGIONS: CPT | Performed by: CHIROPRACTOR

## 2024-07-10 NOTE — PROGRESS NOTES
Initial date of service: 7/10/24    Diagnoses and all orders for this visit:    Lumbar strain, initial encounter    Myofascial pain syndrome  -     Ambulatory referral to Chiropractic    Sacroiliitis (HCC)  -     Ambulatory referral to Chiropractic    Segmental dysfunction of sacral region    Segmental dysfunction of thoracic region    Somatic dysfunction of costovertebral joint structure    Hip flexor tendon tightness, left    DDD (degenerative disc disease), lumbar    No red flags, radiculopathy or neurologic deficit appreciated clinically. Pt's symptoms causally related to 4/26/24 work related injury. Pt's historical account, imaging and exam findings consistent with mechanical back pain secondary to st/sp injury, exacerbated by postural/ergonomic stressors, compensation over time for injury, and underlying DDD/DJD.   Pt responded well to IASTM, flexion biased stretches and manual mobilization of the affected spinal and myofascial tissues with increased ROM; trial of conservative tx recommended consisting of stretching, graded mobilization/manipulation of the affected spinal and myofascial jt dysfunction, postural/ergonomic education and take home stretches/exercises.   Due to scheduled vacation time, pt may follow-up with another DC in this facility in interim.  Spent greater than 30 min c pt discussing hx, pe, ddx, tx options and reviewing notes/imaging    TREATMENT: 70029, 52459  Fear avoidance behavior discussion; encouraged and reassured pt that natural course of condition is to improve over time with adherence to tx plan and home care strategies. Home care recommendations: avoid bed rest, walk (but avoid trails and uneven surfaces), gradual return to activity to tolerance (avoid anything that peripheralizes symptoms), call if symptoms peripheralize, worsen, or neurologic deficit progresses. Ther-ex: IASTM; discussed post procedure soreness and/or ecchymosis for up to 36 hrs, applied to affected mm  "hypertonicities; supine hamstring stretch, supine gluteal stretch, side laying QL stretch, single knee to chest stretch, hip flexor pin-and-stretch, transitional mvmt education, abdominal bracing; greater than 15 min spent performing above mentioned ther-ex to improve ROM/flexibility. Thoracic mobilization/manipulation: prone P-A mob, supine A-P manip; Lumbar mobilization/manipulation: diversified side laying graded HVLA, flexion-traction; SIJ Manipulation/Mobilization: L SIJ HVLA - long axis distraction, mack drop table maneuver to affected SIJ    HPI:  Krys Alex is a 49 y.o. female  Chief Complaint   Patient presents with    Back Pain     Lower lumbar pain that radiates down left side . Patient states \"grabbing \" and sore . Pain score 6-8       Mid back pain \"burning\" discomfort.   Pain score 6-8     Pt presents for eval and tx fpor L sided neck to lower back pain after catching falling patient while bent and twisted to her right 4/26/24 while working for [employer]. Pt has undergone PT with modest benefit. Lumbar and Thoracic Spine MRIs 2024 demonstrate L4-L5: tiny right posterolateral annular fissure, small right foraminal disc protrusion. Mild facet arthropathy, trace facet joint effusions. Mild canal stenosis. Mild-to-moderate right and mild left foraminal narrowing L5-S1: Mild diffuse disc bulge, tiny right posterolateral annular fissure. Pt was seen by Pain Mgmt who referred here for consutl/tx  Pt feels torqued and out of whack    Back Pain  This is a new problem. The current episode started more than 1 month ago. The problem occurs constantly. The problem has been waxing and waning since onset. The pain is present in the gluteal, lumbar spine, sacro-iliac and thoracic spine. The quality of the pain is described as aching. The pain does not radiate. The pain is Worse during the day. The symptoms are aggravated by twisting and bending (transitional mvmts, prolonged walking/standing, " uphill/downhill, cobra stretch; palliative includes rest, massage, traction, laying supine). Pertinent negatives include no bladder incontinence or bowel incontinence.     Past Medical History:   Diagnosis Date    COVID-19 05/2022    Finger fracture, left     Intrinsic muscle tightness 01/16/2019    Pure hypercholesterolemia 10/14/2021    Trigger middle finger of left hand 01/16/2019    UTI symptoms 10/14/2021    Vitamin D deficiency       Past Surgical History:   Procedure Laterality Date    LAPAROSCOPIC OVARIAN CYSTECTOMY Right 2001    Lysis of adhesions, Dr. Miranda    OVARIAN CYST REMOVAL Right 1993    open, Dr. Arce    TONSILECTOMY AND ADNOIDECTOMY      TONSILLECTOMY      WISDOM TOOTH EXTRACTION       The following portions of the patient's history were reviewed and updated as appropriate: allergies, past family history, past medical history, past social history, past surgical history, and problem list.  Review of Systems   Gastrointestinal:  Negative for bowel incontinence.   Genitourinary:  Negative for bladder incontinence.   Musculoskeletal:  Positive for back pain.     Physical Exam  Eyes:      Extraocular Movements: Extraocular movements intact.   Cardiovascular:      Pulses: Normal pulses.   Abdominal:      General: There is no distension.      Tenderness: There is no abdominal tenderness.   Musculoskeletal:      Thoracic back: Spasms and tenderness present. Decreased range of motion.      Lumbar back: Spasms and tenderness present. Decreased range of motion. Negative right straight leg raise test and negative left straight leg raise test.        Back:       Comments: Pnful and limited in Rrot 10, Blf 15(contralaterally), Ext 15, Ext/Brot   Skin:     General: Skin is warm and dry.   Neurological:      Mental Status: She is alert and oriented to person, place, and time.      Cranial Nerves: Cranial nerves 2-12 are intact.      Sensory: Sensation is intact.      Motor: Motor function is intact.       Coordination: Coordination is intact.      Gait: Gait is intact.      Deep Tendon Reflexes: Babinski sign absent on the right side. Babinski sign absent on the left side.      Reflex Scores:       Patellar reflexes are 2+ on the right side and 2+ on the left side.       Achilles reflexes are 2+ on the right side and 2+ on the left side.  Psychiatric:         Mood and Affect: Mood normal.         Behavior: Behavior normal.       SOFT TISSUE ASSESSMENT Hypertonicity and tenderness palpated L T 3-7, 10-S1 erector spinae, L hip flexor, L glute med/min, L QL, L hamstring JOINT RESTRICTIONS: T3-7, 10-S1, L R6, L R9, and L SIJ ORTHO: SI jt point tenderness: +; Angie unremarkable for centralization/peripheralization; johnnie's, iliac compression, thigh thrust elicit stiffness in L SIJ; prone femoral nerve stretch neg for upper lumbar neural tension, elicits R/L SIJ stiffness; sitting root elicits no lbp on R/L; slump test elicits no neural tension R/L    Return in about 1 week (around 7/17/2024) for Next scheduled follow up. 1xwk/4wks.

## 2024-07-10 NOTE — TELEPHONE ENCOUNTER
Caller: Patient    Doctor/Office: Ortho    Call regarding :  Scheduling w/ Chiropractic     Call was transferred to: Chiro

## 2024-07-10 NOTE — PROGRESS NOTES
Assessment  1. Myofascial pain syndrome    2. Sacroiliitis (HCC)        Plan    Reviewed MRI and imaging with the patient.  She has left-sided symptoms.  I believe her pain is most likely related to left sacroiliac joint dysfunction versus facet mediated as well as overlying myofascial pain.    She is due to start dry needling in the near future.  I will refer her to chiropractic treatment for annual therapy, myofascial release and nerve glides.    I recommend following up with her primary care physician regarding an MRI of the thoracic spine in 6 months to 1 year secondary to the hemangioma.    We will follow-up for reevaluation to consider sacroiliac joint injection if her pain persists literature was provided for home review.    My impressions and treatment recommendations were discussed in detail with the patient who verbalized understanding and had no further questions.  Discharge instructions were provided. I personally saw and examined the patient and I agree with the above discussed plan of care.  This note is created using dictation transcription.  It may contain typographical errors, grammatical errors, improperly dictated words, background noise and other errors.    Orders Placed This Encounter   Procedures    Ambulatory referral to Chiropractic     Standing Status:   Future     Standing Expiration Date:   7/10/2025     Referral Priority:   Routine     Referral Type:   Chiropractic     Referral Reason:   Specialty Services Required     Referred to Provider:   Micah Cunningham DC     Requested Specialty:   Chiropractic Medicine     Number of Visits Requested:   1     Expiration Date:   7/10/2025     No orders of the defined types were placed in this encounter.    Referred By: Dion Lacy PA-C  History of Present Illness    Krys Alex is a 49 y.o. female was injured at work on April 26, 2024 when helping a patient.    Patient's symptoms are moderate to severe she rates pain 6-8 out of 10 the  visual analog scale after undergoing chiropractic treatment and physical therapy.  Describes pain as burning shooting with a sharp sensation.  She reports that lying down standing bending sitting and walking all increase her symptoms.  Denies any bowel or bladder difficulty or any motor deficits.    I have personally reviewed and/or updated the patient's past medical history, past surgical history, family history, social history, current medications, allergies, and vital signs today.     Review of Systems   Constitutional:  Positive for unexpected weight change. Negative for fever.   HENT:  Negative for trouble swallowing.    Eyes:  Negative for visual disturbance.   Respiratory:  Negative for shortness of breath and wheezing.    Cardiovascular:  Negative for chest pain and palpitations.   Gastrointestinal:  Negative for constipation, diarrhea, nausea and vomiting.   Endocrine: Negative for cold intolerance, heat intolerance and polydipsia.   Genitourinary:  Negative for difficulty urinating and frequency.   Musculoskeletal:  Positive for myalgias. Negative for arthralgias, gait problem and joint swelling.   Skin:  Negative for rash.   Neurological:  Negative for dizziness, seizures, syncope, weakness and headaches.   Hematological:  Does not bruise/bleed easily.   Psychiatric/Behavioral:  Negative for dysphoric mood.    All other systems reviewed and are negative.      Patient Active Problem List   Diagnosis    Migraine without aura    Seasonal allergic rhinitis    Vertigo       Past Medical History:   Diagnosis Date    COVID-19 05/2022    Finger fracture, left     Intrinsic muscle tightness 01/16/2019    Pure hypercholesterolemia 10/14/2021    Trigger middle finger of left hand 01/16/2019    UTI symptoms 10/14/2021    Vitamin D deficiency        Past Surgical History:   Procedure Laterality Date    LAPAROSCOPIC OVARIAN CYSTECTOMY Right 2001    Lysis of adhesions, Dr. Miranda    OVARIAN CYST REMOVAL Right 1993    open,  Dr. Arce    TONSILECTOMY AND ADNOIDECTOMY      TONSILLECTOMY      WISDOM TOOTH EXTRACTION         Family History   Problem Relation Age of Onset    Lung cancer Mother 72        non small cell    No Known Problems Father     No Known Problems Sister     No Known Problems Sister     No Known Problems Brother     No Known Problems Daughter     No Known Problems Daughter     Hyperlipidemia Maternal Grandmother     Thyroid disease Maternal Grandmother     Lung cancer Maternal Grandmother 60    Coronary artery disease Maternal Grandfather     Hyperlipidemia Maternal Grandfather     Alcohol abuse Maternal Grandfather     Parkinsonism Maternal Grandfather     Heart disease Paternal Grandmother     Heart disease Paternal Grandfather     No Known Problems Maternal Aunt     No Known Problems Paternal Aunt     No Known Problems Paternal Uncle        Social History     Occupational History    Not on file   Tobacco Use    Smoking status: Never    Smokeless tobacco: Never   Vaping Use    Vaping status: Never Used   Substance and Sexual Activity    Alcohol use: Yes     Alcohol/week: 2.0 standard drinks of alcohol     Types: 2 Glasses of wine per week     Comment: occasional    Drug use: Never    Sexual activity: Yes     Partners: Male     Birth control/protection: None, Male Sterilization       Current Outpatient Medications on File Prior to Visit   Medication Sig    butalbital-acetaminophen-caffeine (FIORICET,ESGIC) -40 mg per tablet Take 1 tablet by mouth every 6 (six) hours as needed for headaches    Cholecalciferol (VITAMIN D3 PO) Take by mouth    ibuprofen (MOTRIN) 800 mg tablet Take 1 tablet (800 mg total) by mouth 3 (three) times a day    loratadine (Claritin) 10 mg tablet     Triamcinolone Acetonide (Nasacort Allergy 24HR) 55 MCG/ACT nasal spray     diazepam (VALIUM) 5 mg tablet Take 2 tablets (10 mg total) by mouth every 8 (eight) hours as needed for anxiety (BACK SPASM) for up to 9 days    meclizine (ANTIVERT) 12.5  "MG tablet Take 1 tablet (12.5 mg total) by mouth 3 (three) times a day as needed for dizziness (Patient not taking: Reported on 12/4/2023)     No current facility-administered medications on file prior to visit.       No Known Allergies    Physical Exam    /88 (BP Location: Left arm, Patient Position: Sitting, Cuff Size: Standard)   Pulse 87   Temp 98.6 °F (37 °C)   Ht 5' 6\" (1.676 m)   Wt 72.6 kg (160 lb)   LMP 03/01/2023 (Approximate)   BMI 25.82 kg/m²     Constitutional: normal, well developed, well nourished, alert, in no distress and non-toxic and no overt pain behavior.  Eyes: anicteric  HEENT: grossly intact  Neck: supple, symmetric, trachea midline and no masses   Pulmonary:even and unlabored  Cardiovascular:No edema or pitting edema present  Skin:Normal without rashes or lesions and well hydrated  Psychiatric:Mood and affect appropriate  Neurologic:Cranial Nerves II-XII grossly intact  Musculoskeletal:normal, difficulty going from sitting to standing sitting position; no obvious skin lesions or erythema lumbar sacral spine; mild tenderness in lumbar paravertebrals, no sacroiliac or greater trochanteric tenderness bilateral; deep tendon reflexes are diminished but symmetrical bilateral patellar and achilles; no focal motor deficit appreciated lower limbs; negative bilateral straight leg raising, positive Dieudonne's maneuver on the left.    Imaging  MRI LUMBAR SPINE WITHOUT CONTRAST@  6-13-24     INDICATION: S39.012D: Strain of muscle, fascia and tendon of lower back, subsequent encounter.     COMPARISON: Same day MR thoracic spine without contrast. Entire spine radiograph 4/8/2016.     TECHNIQUE:  Multiplanar, multisequence imaging of the lumbar spine was performed. .        IMAGE QUALITY:  Diagnostic     FINDINGS:     VERTEBRAL BODIES:  There are 5 lumbar type vertebral bodies.  Normal alignment of the lumbar spine.  No spondylolysis or spondylolisthesis. No scoliosis.  No compression fracture. "  Normal marrow signal is identified within the visualized bony structures.    No discrete marrow lesion.     SACRUM:  Normal signal within the sacrum. No evidence of insufficiency or stress fracture.     DISTAL CORD AND CONUS:  Normal size and signal within the distal cord and conus.     PARASPINAL SOFT TISSUES:  Paraspinal soft tissues are unremarkable.     LOWER THORACIC DISC SPACES:  Normal disc height and signal.  No disc herniation, canal stenosis or foraminal narrowing.     LUMBAR DISC SPACES: Mild multilevel disc desiccation.     L1-L2: Normal.     L2-L3: Normal.     L3-L4: Normal.     L4-L5: Mild diffuse disc bulge, tiny right posterolateral annular fissure, small right foraminal disc protrusion. Mild facet arthropathy, trace facet joint effusions. Mild canal stenosis. Mild-to-moderate right and mild left foraminal narrowing     L5-S1: Mild diffuse disc bulge, tiny right posterolateral annular fissure. No significant canal stenosis or foraminal narrowing.     OTHER FINDINGS:  None.     IMPRESSION:     No acute abnormality of lumbar spine.     Multilevel degenerative changes of lumbar spine with varying degrees of canal stenosis (mild L4-L5) and foraminal narrowing (mild-to-moderate right L4-L5), as detailed above.    MRI THORACIC SPINE WITHOUT CONTRAST @  6-13-24     INDICATION: S29.012D: Strain of muscle and tendon of back wall of thorax, subsequent encounter.     COMPARISON: Same day MRI lumbar spine without contrast. CTA head and neck with and without contrast 4/20/2017. Entire spine radiograph 4/8/2016.     TECHNIQUE:  Multiplanar, multisequence imaging of the thoracic spine was performed. .     IMAGE QUALITY: Suboptimal image quality. Mild motion artifact on sagittal T2 sequence.     FINDINGS:     ALIGNMENT: Normal alignment of the thoracic spine.  No compression fracture.  No subluxation.  No scoliosis.     MARROW SIGNAL: T6 intraosseous vertebral body hemangioma, unchanged. Otherwise, normal bone  marrow signal.     THORACIC CORD: Normal signal within the thoracic cord.     PARAVERTEBRAL SOFT TISSUES:  Normal.     THORACIC DEGENERATIVE CHANGE: No disc herniation, canal stenosis or foraminal narrowing. No degenerative changes.     OTHER FINDINGS:  None.     IMPRESSION:     Suboptimal image quality. Mild motion artifact on sagittal T2 sequence.  - No acute abnormality of thoracic spine.  - Additional chronic/incidental findings as detailed above.     Please see same day MRI lumbar spine without contrast for further evaluation.      I have personally reviewed pertinent films in PACS and my interpretation is mild spondylosis with a disc protrusion.

## 2024-07-11 ENCOUNTER — APPOINTMENT (OUTPATIENT)
Dept: PHYSICAL THERAPY | Age: 49
End: 2024-07-11
Payer: OTHER MISCELLANEOUS

## 2024-07-12 ENCOUNTER — OFFICE VISIT (OUTPATIENT)
Dept: PHYSICAL THERAPY | Facility: CLINIC | Age: 49
End: 2024-07-12
Payer: OTHER MISCELLANEOUS

## 2024-07-12 DIAGNOSIS — M54.16 LUMBAR RADICULOPATHY: ICD-10-CM

## 2024-07-12 DIAGNOSIS — S29.019A THORACIC MYOFASCIAL STRAIN, INITIAL ENCOUNTER: ICD-10-CM

## 2024-07-12 DIAGNOSIS — R10.2 PAIN OF PELVIC GIRDLE: Primary | ICD-10-CM

## 2024-07-12 DIAGNOSIS — S29.019D THORACIC MYOFASCIAL STRAIN, SUBSEQUENT ENCOUNTER: ICD-10-CM

## 2024-07-12 PROCEDURE — 97140 MANUAL THERAPY 1/> REGIONS: CPT

## 2024-07-12 PROCEDURE — 97112 NEUROMUSCULAR REEDUCATION: CPT

## 2024-07-12 NOTE — PROGRESS NOTES
Daily Note     Today's date: 2024  Patient name: Krys Alex  : 1975  MRN: [id number]  Referring provider: Do Mccullough DO  Dx:   Encounter Diagnosis     ICD-10-CM    1. Pain of pelvic girdle  R10.2       2. Thoracic myofascial strain, subsequent encounter  S29.019D       3. Lumbar radiculopathy  M54.16       4. Thoracic myofascial strain, initial encounter  S29.019A           Start Time: 1240  Stop Time: 1330  Total time in clinic (min): 50 minutes    Subjective: Reports feeling much better after going to chiro. Currently in 4/10 discomfort      Objective: See treatment diary below  Dry needling consent for reviewed and signed by patient. Pt educated on dry needling to include but not limited to: risks/benefits/aftercare instructions. Provided with educational handout on DN. All questions and concerns answered and addressed.     Assessment: Tolerated treatment well. Patient would benefit from continued PT in order to down train central nervous system.  Worked on diaphragmatic breathing to engage vagus nerve. Right thoracic erector spinae with very decreased tissue extensibility. Needle placement kept to minimum as patient hyper sensitive to placement.   Pt verbally consented to dry needling treatment session. Risks/benefits/aftercare instructions reviewed. All questions/concerns addressed.  Pt in prone position. Hand hygiene performed pre and post DN treatment session. Placement of needles in pathological tissue.   4 ears, 4 CTJ, 10 erector spinae (needle location).  Total treatment duration to include set up/break down/in situ: 40 minutes. Patient was supervised by clinician throughout entirety of treatment session to include when DN in situ; clinician next to patient. All needles counted upon insertion and removal-- 18 needles. All accounted for and disposed in appropriate sharp container.     No adverse reaction to treatment. Pt advised may experience muscular fatigue and soreness. Pt  verbalized understanding.        Plan: Continue per plan of care.      Precautions: standard      Date: 7/9/24 7/12/24         IE 2        Manuals          Dry needling  performed          PEMF 10 min lvl 12                            Neuro Re-Ed          Neural reset          360 breathing          Bridge with right knee drop out BTB 20x 2x day           Gentle diaphragmatic breathing                                      Ther Ex          Standing hip abd/ ext          [place] pose with walk out          Cobbler's pose                                                            Ther Activity                              Gait Training                              Modalities

## 2024-07-16 ENCOUNTER — OFFICE VISIT (OUTPATIENT)
Dept: PHYSICAL THERAPY | Facility: CLINIC | Age: 49
End: 2024-07-16
Payer: OTHER MISCELLANEOUS

## 2024-07-16 DIAGNOSIS — S29.019A THORACIC MYOFASCIAL STRAIN, INITIAL ENCOUNTER: ICD-10-CM

## 2024-07-16 DIAGNOSIS — S29.019D THORACIC MYOFASCIAL STRAIN, SUBSEQUENT ENCOUNTER: ICD-10-CM

## 2024-07-16 DIAGNOSIS — M54.16 LUMBAR RADICULOPATHY: ICD-10-CM

## 2024-07-16 DIAGNOSIS — R10.2 PAIN OF PELVIC GIRDLE: Primary | ICD-10-CM

## 2024-07-16 PROCEDURE — 97112 NEUROMUSCULAR REEDUCATION: CPT

## 2024-07-16 PROCEDURE — 97140 MANUAL THERAPY 1/> REGIONS: CPT

## 2024-07-16 NOTE — PROGRESS NOTES
Daily Note     Today's date: 2024  Patient name: Krys Alex  : 1975  MRN: [id number]  Referring provider: Do Mccullough DO  Dx:   Encounter Diagnosis     ICD-10-CM    1. Pain of pelvic girdle  R10.2       2. Thoracic myofascial strain, subsequent encounter  S29.019D       3. Lumbar radiculopathy  M54.16       4. Thoracic myofascial strain, initial encounter  S29.019A                      Subjective: Reports discomfort level of 7/10 which has had a low point of 4/10 this week      Objective: See treatment diary below      Assessment: Tolerated treatment well. Patient would benefit from continued PT in order to continue down training central nervous system. Left piriformis and upper trap with decreased tissue extensibility. Right glute med with decreased tissue extensibility. Trialed cupping to improve tissue extensibility of b/l erectors and QL.       Plan: Continue per plan of care.  Dry needling next session     Precautions: standard      Date: 24        IE 2 3       Manuals          Dry needling  performed          PEMF 10 min lvl 12 PEMF 10 min lvl 12          TPR b/ glutes, piriformis, erector spinae, upper trap          Gr V CTJ mobilization b/l          Cupping b/l QL and erector spinae                           Neuro Re-Ed          Neural reset          360 breathing          Bridge with right knee drop out BTB 20x 2x day           Gentle diaphragmatic breathing Gentle diaphragmatic breathing                                      Ther Ex          Standing hip abd/ ext          [place] pose with walk out          Cobbler's pose                                                            Ther Activity                              Gait Training                              Modalities

## 2024-07-17 ENCOUNTER — APPOINTMENT (OUTPATIENT)
Dept: LAB | Age: 49
End: 2024-07-17

## 2024-07-17 ENCOUNTER — APPOINTMENT (OUTPATIENT)
Dept: URGENT CARE | Age: 49
End: 2024-07-17
Payer: OTHER MISCELLANEOUS

## 2024-07-17 DIAGNOSIS — Z00.8 HEALTH EXAMINATION IN POPULATION SURVEY: ICD-10-CM

## 2024-07-17 LAB
CHOLEST SERPL-MCNC: 262 MG/DL
EST. AVERAGE GLUCOSE BLD GHB EST-MCNC: 105 MG/DL
HBA1C MFR BLD: 5.3 %
HDLC SERPL-MCNC: 80 MG/DL
LDLC SERPL CALC-MCNC: 169 MG/DL (ref 0–100)
NONHDLC SERPL-MCNC: 182 MG/DL
TRIGL SERPL-MCNC: 67 MG/DL

## 2024-07-17 PROCEDURE — 83036 HEMOGLOBIN GLYCOSYLATED A1C: CPT

## 2024-07-17 PROCEDURE — 80061 LIPID PANEL: CPT

## 2024-07-17 PROCEDURE — 99214 OFFICE O/P EST MOD 30 MIN: CPT | Performed by: PHYSICIAN ASSISTANT

## 2024-07-17 PROCEDURE — 36415 COLL VENOUS BLD VENIPUNCTURE: CPT

## 2024-07-18 ENCOUNTER — OFFICE VISIT (OUTPATIENT)
Dept: PHYSICAL THERAPY | Facility: CLINIC | Age: 49
End: 2024-07-18
Payer: OTHER MISCELLANEOUS

## 2024-07-18 DIAGNOSIS — S29.019D THORACIC MYOFASCIAL STRAIN, SUBSEQUENT ENCOUNTER: ICD-10-CM

## 2024-07-18 DIAGNOSIS — R10.2 PAIN OF PELVIC GIRDLE: Primary | ICD-10-CM

## 2024-07-18 DIAGNOSIS — M54.16 LUMBAR RADICULOPATHY: ICD-10-CM

## 2024-07-18 DIAGNOSIS — S29.019A THORACIC MYOFASCIAL STRAIN, INITIAL ENCOUNTER: ICD-10-CM

## 2024-07-18 PROCEDURE — 97112 NEUROMUSCULAR REEDUCATION: CPT

## 2024-07-18 PROCEDURE — 97140 MANUAL THERAPY 1/> REGIONS: CPT

## 2024-07-18 NOTE — PROGRESS NOTES
Daily Note     Today's date: 2024  Patient name: Krys Alex  : 1975  MRN: [id number]  Referring provider: Do Mccullough DO  Dx:   Encounter Diagnosis     ICD-10-CM    1. Pain of pelvic girdle  R10.2       2. Thoracic myofascial strain, subsequent encounter  S29.019D       3. Lumbar radiculopathy  M54.16       4. Thoracic myofascial strain, initial encounter  S29.019A                      Subjective: Feels more sxs in right side now where she believes injury is as well      Objective: See treatment diary below      Assessment: Tolerated treatment well. Patient would benefit from continued PT in order to continue working on down training central nervous system.  Much more tolerant to dry needling this session. Right glute med and erectors with decreased tissue extensibility.   Pt verbally consented to dry needling treatment session. Risks/benefits/aftercare instructions reviewed. All questions/concerns addressed.  Pt in prone position. Hand hygiene performed pre and post DN treatment session. Placement of needles in pathological tissue.   4 ears, 4 CTJ, 6 sacrum, 8 erector spinae, 2 glute med (needle location).  Total treatment duration to include set up/break down/in situ: 55 minutes. Patient was supervised by clinician throughout entirety of treatment session to include when DN in situ; clinician next to patient. All needles counted upon insertion and removal-- 24 needles. All accounted for and disposed in appropriate sharp container.     No adverse reaction to treatment. Pt advised may experience muscular fatigue and soreness. Pt verbalized understanding.          Plan: Continue per plan of care.      Precautions: standard      Date: 24       IE 2 3 4      Manuals          Dry needling  performed          PEMF 10 min lvl 12 PEMF 10 min lvl 12 DN- see above         TPR b/ glutes, piriformis, erector spinae, upper trap          Gr V CTJ mobilization b/l           Cupping b/l QL and erector spinae                           Neuro Re-Ed          Neural reset          360 breathing          Bridge with right knee drop out BTB 20x 2x day           Gentle diaphragmatic breathing Gentle diaphragmatic breathing  Gentle diaphragmatic breathing                                    Ther Ex          Standing hip abd/ ext          [place] pose with walk out          Cobbler's pose                                                            Ther Activity                              Gait Training                              Modalities

## 2024-07-23 ENCOUNTER — APPOINTMENT (OUTPATIENT)
Dept: URGENT CARE | Age: 49
End: 2024-07-23
Payer: OTHER MISCELLANEOUS

## 2024-07-23 PROCEDURE — 99215 OFFICE O/P EST HI 40 MIN: CPT | Performed by: PREVENTIVE MEDICINE

## 2024-07-30 ENCOUNTER — OFFICE VISIT (OUTPATIENT)
Dept: PHYSICAL THERAPY | Facility: CLINIC | Age: 49
End: 2024-07-30
Payer: OTHER MISCELLANEOUS

## 2024-07-30 DIAGNOSIS — M54.16 LUMBAR RADICULOPATHY: ICD-10-CM

## 2024-07-30 DIAGNOSIS — S29.019A THORACIC MYOFASCIAL STRAIN, INITIAL ENCOUNTER: ICD-10-CM

## 2024-07-30 DIAGNOSIS — S29.019D THORACIC MYOFASCIAL STRAIN, SUBSEQUENT ENCOUNTER: ICD-10-CM

## 2024-07-30 DIAGNOSIS — R10.2 PAIN OF PELVIC GIRDLE: Primary | ICD-10-CM

## 2024-07-30 PROCEDURE — 97140 MANUAL THERAPY 1/> REGIONS: CPT

## 2024-07-30 PROCEDURE — 97112 NEUROMUSCULAR REEDUCATION: CPT

## 2024-07-30 NOTE — PROGRESS NOTES
Daily Note     Today's date: 2024  Patient name: Krys Alex  : 1975  MRN: [id number]  Referring provider: Do Mccullough DO  Dx:   Encounter Diagnosis     ICD-10-CM    1. Pain of pelvic girdle  R10.2       2. Thoracic myofascial strain, subsequent encounter  S29.019D       3. Lumbar radiculopathy  M54.16       4. Thoracic myofascial strain, initial encounter  S29.019A           Start Time: 1200  Stop Time: 1255  Total time in clinic (min): 55 minutes    Subjective: Reports some minimal improvement from dry needling      Objective: See treatment diary below      Assessment: Tolerated treatment well. Patient would benefit from continued PT in order to increase tissue extensibility of b/l piriformis, QL, glutes, erector spinae and upper traps.  Next session will revisit dry needling. Supine to sit test negative for rotated innominate.       Plan: Continue per plan of care.      Precautions: standard      Date: 24      IE 2 3 4 5     Manuals          Dry needling  performed   Forward flexion and supine to sit test       PEMF 10 min lvl 12 PEMF 10 min lvl 12 DN- see above TPR  b/l piriformis, QL, glutes, erector spinae and upper traps        TPR b/ glutes, piriformis, erector spinae, upper trap  PEMF 10 min lvl 12        Gr V CTJ mobilization b/l          Cupping b/l QL and erector spinae                           Neuro Re-Ed          Neural reset          360 breathing          Bridge with right knee drop out BTB 20x 2x day           Gentle diaphragmatic breathing Gentle diaphragmatic breathing  Gentle diaphragmatic breathing Gentle diaphragmatic breathing                                   Ther Ex          Standing hip abd/ ext          [place] pose with walk out          Cobbler's pose                                                            Ther Activity                              Gait Training                              Modalities

## 2024-08-01 ENCOUNTER — PROCEDURE VISIT (OUTPATIENT)
Age: 49
End: 2024-08-01
Payer: OTHER MISCELLANEOUS

## 2024-08-01 VITALS
DIASTOLIC BLOOD PRESSURE: 76 MMHG | OXYGEN SATURATION: 98 % | BODY MASS INDEX: 25.71 KG/M2 | WEIGHT: 160 LBS | HEIGHT: 66 IN | HEART RATE: 97 BPM | SYSTOLIC BLOOD PRESSURE: 118 MMHG

## 2024-08-01 DIAGNOSIS — M46.1 SACROILIITIS (HCC): ICD-10-CM

## 2024-08-01 DIAGNOSIS — M79.18 MYOFASCIAL PAIN SYNDROME: Primary | ICD-10-CM

## 2024-08-01 DIAGNOSIS — M99.02 SEGMENTAL DYSFUNCTION OF THORACIC REGION: ICD-10-CM

## 2024-08-01 DIAGNOSIS — S39.012A LUMBAR STRAIN, INITIAL ENCOUNTER: ICD-10-CM

## 2024-08-01 DIAGNOSIS — M99.04 SEGMENTAL DYSFUNCTION OF SACRAL REGION: ICD-10-CM

## 2024-08-01 DIAGNOSIS — M51.36 DDD (DEGENERATIVE DISC DISEASE), LUMBAR: ICD-10-CM

## 2024-08-01 DIAGNOSIS — M24.552 HIP FLEXOR TENDON TIGHTNESS, LEFT: ICD-10-CM

## 2024-08-01 DIAGNOSIS — M99.08 SOMATIC DYSFUNCTION OF COSTOVERTEBRAL JOINT STRUCTURE: ICD-10-CM

## 2024-08-01 PROCEDURE — 97110 THERAPEUTIC EXERCISES: CPT | Performed by: CHIROPRACTOR

## 2024-08-01 PROCEDURE — 98941 CHIROPRACT MANJ 3-4 REGIONS: CPT | Performed by: CHIROPRACTOR

## 2024-08-01 NOTE — PROGRESS NOTES
Initial date of service: 7/10/24    Diagnoses and all orders for this visit:    Myofascial pain syndrome    Sacroiliitis (HCC)    Segmental dysfunction of sacral region    Segmental dysfunction of thoracic region    Somatic dysfunction of costovertebral joint structure    Hip flexor tendon tightness, left    DDD (degenerative disc disease), lumbar    Lumbar strain, initial encounter    Pt slightly improved    TREATMENT: [id number], [id number]  Ther-ex: IASTM; discussed post procedure soreness and/or ecchymosis for up to 36 hrs, applied to affected mm hypertonicities; supine hamstring stretch, supine gluteal stretch, side laying QL stretch, single knee to chest stretch, hip flexor pin-and-stretch, transitional mvmt education, abdominal bracing; greater than 15 min spent performing above mentioned ther-ex to improve ROM/flexibility. Thoracic mobilization/manipulation: prone P-A mob, supine A-P manip; Lumbar mobilization/manipulation: diversified side laying graded HVLA, flexion-traction; SIJ Manipulation/Mobilization: L SIJ HVLA - long axis distraction, mack drop table maneuver to affected SIJ    HPI:  Krys Alex is a 49 y.o. female  Chief Complaint   Patient presents with    Back Pain     Lower back pain-7     Pt presents for tx fpor L sided neck to lower back pain after catching falling patient while bent and twisted to her right 4/26/24 while working for [employer]. Pt has undergone PT with modest benefit. Lumbar and Thoracic Spine MRIs 2024 demonstrate L4-L5: tiny right posterolateral annular fissure, small right foraminal disc protrusion. Mild facet arthropathy, trace facet joint effusions. Mild canal stenosis. Mild-to-moderate right and mild left foraminal narrowing L5-S1: Mild diffuse disc bulge, tiny right posterolateral annular fissure. Pt was seen by Pain Mgmt who referred here for consutl/tx  8/1: Pt reports feeling slightly better; not as torqued    Back Pain  This is a new problem. The  current episode started more than 1 month ago. The problem occurs constantly. The problem has been waxing and waning since onset. The pain is present in the gluteal, lumbar spine, sacro-iliac and thoracic spine. The quality of the pain is described as aching. The pain does not radiate. The pain is Worse during the day. The symptoms are aggravated by twisting and bending (transitional mvmts, prolonged walking/standing, uphill/downhill, cobra stretch; palliative includes rest, massage, traction, laying supine). Pertinent negatives include no bladder incontinence or bowel incontinence.     Past Medical History:   Diagnosis Date    COVID-19 05/2022    Finger fracture, left     Intrinsic muscle tightness 01/16/2019    Pure hypercholesterolemia 10/14/2021    Trigger middle finger of left hand 01/16/2019    UTI symptoms 10/14/2021    Vitamin D deficiency       Past Surgical History:   Procedure Laterality Date    LAPAROSCOPIC OVARIAN CYSTECTOMY Right 2001    Lysis of adhesions, Dr. Miranda    OVARIAN CYST REMOVAL Right 1993    open, Dr. Arce    TONSILECTOMY AND ADNOIDECTOMY      TONSILLECTOMY      WISDOM TOOTH EXTRACTION       The following portions of the patient's history were reviewed and updated as appropriate: allergies, past family history, past medical history, past social history, past surgical history, and problem list.  Review of Systems   Gastrointestinal:  Negative for bowel incontinence.   Genitourinary:  Negative for bladder incontinence.   Musculoskeletal:  Positive for back pain.     Physical Exam  Musculoskeletal:      Thoracic back: Spasms and tenderness present. Decreased range of motion.      Lumbar back: Spasms and tenderness present. Decreased range of motion. Negative right straight leg raise test and negative left straight leg raise test.        Back:       Comments: Pnful and limited in Rrot 10, Blf 15(contralaterally), Ext 15, Ext/Brot   Skin:     General: Skin is warm and dry.   Neurological:       Mental Status: She is alert and oriented to person, place, and time.      Gait: Gait is intact.   Psychiatric:         Mood and Affect: Mood normal.         Behavior: Behavior normal.       SOFT TISSUE ASSESSMENT Hypertonicity and tenderness palpated L T 3-7, 10-S1 erector spinae, L hip flexor, L glute med/min, L QL, L hamstring JOINT RESTRICTIONS: T3-7, 10-S1, L R6, L R9, and L SIJ     Return in about 1 week (around 8/8/2024) for Next scheduled follow up.

## 2024-08-02 ENCOUNTER — OFFICE VISIT (OUTPATIENT)
Dept: PHYSICAL THERAPY | Facility: CLINIC | Age: 49
End: 2024-08-02
Payer: OTHER MISCELLANEOUS

## 2024-08-02 DIAGNOSIS — S29.019D THORACIC MYOFASCIAL STRAIN, SUBSEQUENT ENCOUNTER: ICD-10-CM

## 2024-08-02 DIAGNOSIS — S29.019A THORACIC MYOFASCIAL STRAIN, INITIAL ENCOUNTER: ICD-10-CM

## 2024-08-02 DIAGNOSIS — M54.16 LUMBAR RADICULOPATHY: ICD-10-CM

## 2024-08-02 DIAGNOSIS — R10.2 PAIN OF PELVIC GIRDLE: Primary | ICD-10-CM

## 2024-08-02 PROCEDURE — 97112 NEUROMUSCULAR REEDUCATION: CPT

## 2024-08-02 PROCEDURE — 97140 MANUAL THERAPY 1/> REGIONS: CPT

## 2024-08-02 NOTE — PROGRESS NOTES
Daily Note     Today's date: 2024  Patient name: Krys Alex  : 1975  MRN: [id number]  Referring provider: Do Mccullough DO  Dx:   Encounter Diagnosis     ICD-10-CM    1. Pain of pelvic girdle  R10.2       2. Thoracic myofascial strain, subsequent encounter  S29.019D       3. Lumbar radiculopathy  M54.16       4. Thoracic myofascial strain, initial encounter  S29.019A                      Subjective: Reports feeling a little bit better      Objective: See treatment diary below      Assessment: Tolerated treatment well. Patient would benefit from continued PT in order to address decreased tissue extensibility of bilateral QL, erector spinae, upper traps, scalenes and suboccipitals. Responded well to TPR followed by cupping and Aura wave PEMF.  Will DN next session.       Plan: Continue per plan of care.      Precautions: standard      Date: 24    IE 2 3 4 5     Manuals          Dry needling  performed   Forward flexion and supine to sit test  PEMF 10 min lvl 12     PEMF 10 min lvl 12 PEMF 10 min lvl 12 DN- see above TPR  b/l piriformis, QL, glutes, erector spinae and upper traps  TPR  b/l piriformis, QL, glutes, erector spinae and upper traps+ scalenes, suboccipitals      TPR b/ glutes, piriformis, erector spinae, upper trap  PEMF 10 min lvl 12        Gr V CTJ mobilization b/l          Cupping b/l QL and erector spinae    Cupping b/l QL and erector spinae                       Neuro Re-Ed          Neural reset          360 breathing          Bridge with right knee drop out BTB 20x 2x day           Gentle diaphragmatic breathing Gentle diaphragmatic breathing  Gentle diaphragmatic breathing Gentle diaphragmatic breathing  Gentle diaphragmatic breathing                                 Ther Ex          Standing hip abd/ ext          [place] pose with walk out          Cobbler's pose                                                            Ther Activity                               Gait Training                              Modalities

## 2024-08-05 DIAGNOSIS — G43.909 MIGRAINE HEADACHE: ICD-10-CM

## 2024-08-05 RX ORDER — BUTALBITAL, ACETAMINOPHEN AND CAFFEINE 50; 325; 40 MG/1; MG/1; MG/1
1 TABLET ORAL EVERY 6 HOURS PRN
Qty: 15 TABLET | Refills: 0 | Status: SHIPPED | OUTPATIENT
Start: 2024-08-05

## 2024-08-06 ENCOUNTER — PROCEDURE VISIT (OUTPATIENT)
Age: 49
End: 2024-08-06
Payer: OTHER MISCELLANEOUS

## 2024-08-06 ENCOUNTER — OFFICE VISIT (OUTPATIENT)
Dept: PHYSICAL THERAPY | Facility: CLINIC | Age: 49
End: 2024-08-06
Payer: OTHER MISCELLANEOUS

## 2024-08-06 VITALS
SYSTOLIC BLOOD PRESSURE: 118 MMHG | BODY MASS INDEX: 25.71 KG/M2 | DIASTOLIC BLOOD PRESSURE: 70 MMHG | OXYGEN SATURATION: 99 % | HEART RATE: 91 BPM | WEIGHT: 160 LBS | HEIGHT: 66 IN

## 2024-08-06 DIAGNOSIS — M99.04 SEGMENTAL DYSFUNCTION OF SACRAL REGION: ICD-10-CM

## 2024-08-06 DIAGNOSIS — M24.552 HIP FLEXOR TENDON TIGHTNESS, LEFT: ICD-10-CM

## 2024-08-06 DIAGNOSIS — M46.1 SACROILIITIS (HCC): ICD-10-CM

## 2024-08-06 DIAGNOSIS — M54.16 LUMBAR RADICULOPATHY: ICD-10-CM

## 2024-08-06 DIAGNOSIS — M99.02 SEGMENTAL DYSFUNCTION OF THORACIC REGION: ICD-10-CM

## 2024-08-06 DIAGNOSIS — S29.019A THORACIC MYOFASCIAL STRAIN, INITIAL ENCOUNTER: ICD-10-CM

## 2024-08-06 DIAGNOSIS — M79.18 MYOFASCIAL PAIN SYNDROME: Primary | ICD-10-CM

## 2024-08-06 DIAGNOSIS — M99.08 SOMATIC DYSFUNCTION OF COSTOVERTEBRAL JOINT STRUCTURE: ICD-10-CM

## 2024-08-06 DIAGNOSIS — S39.012A LUMBAR STRAIN, INITIAL ENCOUNTER: ICD-10-CM

## 2024-08-06 DIAGNOSIS — M51.36 DDD (DEGENERATIVE DISC DISEASE), LUMBAR: ICD-10-CM

## 2024-08-06 DIAGNOSIS — S29.019D THORACIC MYOFASCIAL STRAIN, SUBSEQUENT ENCOUNTER: ICD-10-CM

## 2024-08-06 DIAGNOSIS — R10.2 PAIN OF PELVIC GIRDLE: Primary | ICD-10-CM

## 2024-08-06 PROCEDURE — 97110 THERAPEUTIC EXERCISES: CPT | Performed by: CHIROPRACTOR

## 2024-08-06 PROCEDURE — 98941 CHIROPRACT MANJ 3-4 REGIONS: CPT | Performed by: CHIROPRACTOR

## 2024-08-06 PROCEDURE — 97140 MANUAL THERAPY 1/> REGIONS: CPT

## 2024-08-06 PROCEDURE — 97112 NEUROMUSCULAR REEDUCATION: CPT

## 2024-08-06 NOTE — PROGRESS NOTES
Daily Note     Today's date: 2024  Patient name: Krys Alex  : 1975  MRN: [id number]  Referring provider: Do Mccullough DO  Dx:   Encounter Diagnosis     ICD-10-CM    1. Pain of pelvic girdle  R10.2       2. Thoracic myofascial strain, subsequent encounter  S29.019D       3. Lumbar radiculopathy  M54.16       4. Thoracic myofascial strain, initial encounter  S29.019A           Start Time: 1400  Stop Time: 1455  Total time in clinic (min): 55 minutes    Subjective: Reports feeling ok, just came from chiro, 2 migraines in last week. Back pain is a 6/10 down from an 8/10      Objective: See treatment diary below      Assessment: Tolerated treatment well. Patient would benefit from continued PT in order to continue down training central nervous system. Did not tolerate DN into left erector spinae which went into spasm.  Other needles more readily accepted. Slowly introducing more needles into program but Krys is hyper sensitive to insertion with very good body awareness.   Pt verbally consented to dry needling treatment session. Risks/benefits/aftercare instructions reviewed. All questions/concerns addressed.  Pt in supine position. Hand hygiene performed pre and post DN treatment session. Placement of needles in pathological tissue.   4 ears, 6 cervical spine, 4 CTJ 8 left shoulder, 2 erector spinae, 6 sacrum (needle location).  Total treatment duration to include set up/break down/in situ: 45 minutes. Patient was supervised by clinician throughout entirety of treatment session to include when DN in situ; clinician next to patient. All needles counted upon insertion and removal-- 30 needles. All accounted for and disposed in appropriate sharp container.     No adverse reaction to treatment. Pt advised may experience muscular soreness and fatigue. Pt verbalized understanding.          Plan: Continue per plan of care.      Precautions: standard      Date: 24   8/2/24 8/6/24    IE 2 3 4 5      Manuals           Dry needling  performed   Forward flexion and supine to sit test  PEMF 10 min lvl 12 PEMF 10 min lvl 12     PEMF 10 min lvl 12 PEMF 10 min lvl 12 DN- see above TPR  b/l piriformis, QL, glutes, erector spinae and upper traps  TPR  b/l piriformis, QL, glutes, erector spinae and upper traps+ scalenes, suboccipitals DN- see above      TPR b/ glutes, piriformis, erector spinae, upper trap  PEMF 10 min lvl 12         Gr V CTJ mobilization b/l           Cupping b/l QL and erector spinae    Cupping b/l QL and erector spinae                          Neuro Re-Ed           Neural reset           360 breathing           Bridge with right knee drop out BTB 20x 2x day            Gentle diaphragmatic breathing Gentle diaphragmatic breathing  Gentle diaphragmatic breathing Gentle diaphragmatic breathing  Gentle diaphragmatic breathing Gentle diaphragmatic breathing                                    Ther Ex           Standing hip abd/ ext           [place] pose with walk out           Cobbler's pose                                                                  Ther Activity                                 Gait Training                                 Modalities

## 2024-08-06 NOTE — PROGRESS NOTES
Initial date of service: 7/10/24    Diagnoses and all orders for this visit:    Myofascial pain syndrome    Sacroiliitis (HCC)    Segmental dysfunction of sacral region    Segmental dysfunction of thoracic region    Somatic dysfunction of costovertebral joint structure    Hip flexor tendon tightness, left    DDD (degenerative disc disease), lumbar    Lumbar strain, initial encounter    Pt slightly improved    TREATMENT: 89506, 22752  Ther-ex: IASTM; discussed post procedure soreness and/or ecchymosis for up to 36 hrs, applied to affected mm hypertonicities; supine hamstring stretch, supine gluteal stretch, side laying QL stretch, single knee to chest stretch, hip flexor pin-and-stretch, transitional mvmt education, abdominal bracing; greater than 15 min spent performing above mentioned ther-ex to improve ROM/flexibility. Thoracic mobilization/manipulation: prone P-A mob, supine A-P manip; Lumbar mobilization/manipulation: diversified side laying graded HVLA, flexion-traction; SIJ Manipulation/Mobilization: L SIJ HVLA - long axis distraction, mack drop table maneuver to affected SIJ    HPI:  Krys Alex is a 49 y.o. female  Chief Complaint   Patient presents with    Back Pain     Lower back pain-6     Pt presents for tx fpor L sided neck to lower back pain after catching falling patient while bent and twisted to her right 4/26/24 while working for [employer]. Pt has undergone PT with modest benefit. Lumbar and Thoracic Spine MRIs 2024 demonstrate L4-L5: tiny right posterolateral annular fissure, small right foraminal disc protrusion. Mild facet arthropathy, trace facet joint effusions. Mild canal stenosis. Mild-to-moderate right and mild left foraminal narrowing L5-S1: Mild diffuse disc bulge, tiny right posterolateral annular fissure. Pt was seen by Pain Mgmt who referred here for consutl/tx  8/6: Pt reports feeling slightly better; pain today more in mb than lb    Back Pain  This is a new  problem. The current episode started more than 1 month ago. The problem occurs constantly. The problem has been waxing and waning since onset. The pain is present in the gluteal, lumbar spine, sacro-iliac and thoracic spine. The quality of the pain is described as aching. The pain does not radiate. The pain is Worse during the day. The symptoms are aggravated by twisting and bending (transitional mvmts, prolonged walking/standing, uphill/downhill, cobra stretch; palliative includes rest, massage, traction, laying supine). Pertinent negatives include no bladder incontinence or bowel incontinence.     Past Medical History:   Diagnosis Date    COVID-19 05/2022    Finger fracture, left     Intrinsic muscle tightness 01/16/2019    Pure hypercholesterolemia 10/14/2021    Trigger middle finger of left hand 01/16/2019    UTI symptoms 10/14/2021    Vitamin D deficiency       Past Surgical History:   Procedure Laterality Date    LAPAROSCOPIC OVARIAN CYSTECTOMY Right 2001    Lysis of adhesions, Dr. Miranda    OVARIAN CYST REMOVAL Right 1993    open, Dr. Arce    TONSILECTOMY AND ADNOIDECTOMY      TONSILLECTOMY      WISDOM TOOTH EXTRACTION       The following portions of the patient's history were reviewed and updated as appropriate: allergies, past family history, past medical history, past social history, past surgical history, and problem list.  Review of Systems   Gastrointestinal:  Negative for bowel incontinence.   Genitourinary:  Negative for bladder incontinence.   Musculoskeletal:  Positive for back pain.     Physical Exam  Musculoskeletal:      Thoracic back: Spasms and tenderness present. Decreased range of motion.      Lumbar back: Spasms and tenderness present. Decreased range of motion. Negative right straight leg raise test and negative left straight leg raise test.        Back:       Comments: Pnful and limited in Rrot 10, Blf 15(contralaterally), Ext 15, Ext/Brot   Skin:     General: Skin is warm and dry.    Neurological:      Mental Status: She is alert and oriented to person, place, and time.      Gait: Gait is intact.   Psychiatric:         Mood and Affect: Mood normal.         Behavior: Behavior normal.       SOFT TISSUE ASSESSMENT Hypertonicity and tenderness palpated L T 3-7, 10-S1 erector spinae, L hip flexor, L glute med/min, L QL, L hamstring JOINT RESTRICTIONS: T3-7, 10-S1, L R6, L R9, and L SIJ     Return in about 1 week (around 8/13/2024) for Next scheduled follow up.

## 2024-08-08 ENCOUNTER — OFFICE VISIT (OUTPATIENT)
Dept: INTERNAL MEDICINE CLINIC | Facility: CLINIC | Age: 49
End: 2024-08-08
Payer: COMMERCIAL

## 2024-08-08 ENCOUNTER — PROCEDURE VISIT (OUTPATIENT)
Age: 49
End: 2024-08-08
Payer: OTHER MISCELLANEOUS

## 2024-08-08 VITALS
WEIGHT: 158.6 LBS | OXYGEN SATURATION: 97 % | HEART RATE: 87 BPM | TEMPERATURE: 97.9 F | HEIGHT: 66 IN | BODY MASS INDEX: 25.49 KG/M2

## 2024-08-08 VITALS
HEART RATE: 90 BPM | DIASTOLIC BLOOD PRESSURE: 78 MMHG | SYSTOLIC BLOOD PRESSURE: 122 MMHG | WEIGHT: 160 LBS | BODY MASS INDEX: 25.71 KG/M2 | OXYGEN SATURATION: 99 % | HEIGHT: 66 IN

## 2024-08-08 DIAGNOSIS — M99.08 SOMATIC DYSFUNCTION OF COSTOVERTEBRAL JOINT STRUCTURE: ICD-10-CM

## 2024-08-08 DIAGNOSIS — S39.012A LUMBAR STRAIN, INITIAL ENCOUNTER: ICD-10-CM

## 2024-08-08 DIAGNOSIS — M24.552 HIP FLEXOR TENDON TIGHTNESS, LEFT: ICD-10-CM

## 2024-08-08 DIAGNOSIS — M79.18 MYOFASCIAL PAIN SYNDROME: Primary | ICD-10-CM

## 2024-08-08 DIAGNOSIS — M99.02 SEGMENTAL DYSFUNCTION OF THORACIC REGION: ICD-10-CM

## 2024-08-08 DIAGNOSIS — M46.1 SACROILIITIS (HCC): ICD-10-CM

## 2024-08-08 DIAGNOSIS — M99.04 SEGMENTAL DYSFUNCTION OF SACRAL REGION: ICD-10-CM

## 2024-08-08 DIAGNOSIS — M51.36 DDD (DEGENERATIVE DISC DISEASE), LUMBAR: ICD-10-CM

## 2024-08-08 DIAGNOSIS — M62.838 MUSCLE SPASM: ICD-10-CM

## 2024-08-08 DIAGNOSIS — H01.9 INFECTION OF EYELID: Primary | ICD-10-CM

## 2024-08-08 PROCEDURE — 99213 OFFICE O/P EST LOW 20 MIN: CPT | Performed by: INTERNAL MEDICINE

## 2024-08-08 PROCEDURE — 97110 THERAPEUTIC EXERCISES: CPT | Performed by: CHIROPRACTOR

## 2024-08-08 PROCEDURE — 98941 CHIROPRACT MANJ 3-4 REGIONS: CPT | Performed by: CHIROPRACTOR

## 2024-08-08 RX ORDER — TOBRAMYCIN AND DEXAMETHASONE 3; 1 MG/ML; MG/ML
1 SUSPENSION/ DROPS OPHTHALMIC
Qty: 2.5 ML | Refills: 0 | Status: SHIPPED | OUTPATIENT
Start: 2024-08-08

## 2024-08-08 RX ORDER — CYCLOBENZAPRINE HCL 10 MG
10 TABLET ORAL
COMMUNITY

## 2024-08-08 RX ORDER — METHOCARBAMOL 750 MG/1
TABLET, FILM COATED ORAL
Qty: 10 TABLET | Refills: 0 | Status: SHIPPED | OUTPATIENT
Start: 2024-08-08

## 2024-08-08 RX ORDER — SULFAMETHOXAZOLE AND TRIMETHOPRIM 800; 160 MG/1; MG/1
1 TABLET ORAL EVERY 12 HOURS SCHEDULED
Qty: 14 TABLET | Refills: 0 | Status: SHIPPED | OUTPATIENT
Start: 2024-08-08 | End: 2024-08-15

## 2024-08-08 NOTE — PROGRESS NOTES
Initial date of service: 7/10/24    Diagnoses and all orders for this visit:    Myofascial pain syndrome    Sacroiliitis (HCC)    Segmental dysfunction of sacral region    Segmental dysfunction of thoracic region    Somatic dysfunction of costovertebral joint structure    Hip flexor tendon tightness, left    DDD (degenerative disc disease), lumbar    Lumbar strain, initial encounter    Pt expectedly sore after thorough myofascial tx here and then dry needling later that day; responded to tx today with reduced pain and increased ROM. Discussed maintaining proper postures, good biomechanics and consistent home exercise program while on vacation over next week plus; recommended frequent stops and short walks during long drive to [place]    TREATMENT: 21316, 64237  Ther-ex: IASTM; discussed post procedure soreness and/or ecchymosis for up to 36 hrs, applied to affected mm hypertonicities; supine hamstring stretch, supine gluteal stretch, side laying QL stretch, single knee to chest stretch, hip flexor pin-and-stretch, transitional mvmt education, abdominal bracing; greater than 15 min spent performing above mentioned ther-ex to improve ROM/flexibility. Thoracic mobilization/manipulation: prone P-A mob, supine A-P manip; Lumbar mobilization/manipulation: diversified side laying graded HVLA, flexion-traction; SIJ Manipulation/Mobilization: L SIJ HVLA - long axis distraction, mack drop table maneuver to affected SIJ    HPI:  Krys Alex is a 49 y.o. female  Chief Complaint   Patient presents with    Back Pain     Lower back pain-7     Pt presents for tx fpor L sided neck to lower back pain after catching falling patient while bent and twisted to her right 4/26/24 while working for [employer]. Pt has undergone PT with modest benefit. Lumbar and Thoracic Spine MRIs 2024 demonstrate L4-L5: tiny right posterolateral annular fissure, small right foraminal disc protrusion. Mild facet arthropathy, trace facet  joint effusions. Mild canal stenosis. Mild-to-moderate right and mild left foraminal narrowing L5-S1: Mild diffuse disc bulge, tiny right posterolateral annular fissure. Pt was seen by Pain Mgmt who referred here for consutl/tx  8/8: Pt reports increased soreness post tx Tuesday combined with multiple dry needling later that day    Back Pain  This is a new problem. The current episode started more than 1 month ago. The problem occurs constantly. The problem has been waxing and waning since onset. The pain is present in the gluteal, lumbar spine, sacro-iliac and thoracic spine. The quality of the pain is described as aching. The pain does not radiate. The pain is Worse during the day. The symptoms are aggravated by twisting and bending (transitional mvmts, prolonged walking/standing, uphill/downhill, cobra stretch; palliative includes rest, massage, traction, laying supine). Pertinent negatives include no bladder incontinence or bowel incontinence.     Past Medical History:   Diagnosis Date    COVID-19 05/2022    Finger fracture, left     Intrinsic muscle tightness 01/16/2019    Pure hypercholesterolemia 10/14/2021    Trigger middle finger of left hand 01/16/2019    UTI symptoms 10/14/2021    Vitamin D deficiency       Past Surgical History:   Procedure Laterality Date    LAPAROSCOPIC OVARIAN CYSTECTOMY Right 2001    Lysis of adhesions, Dr. Miranda    OVARIAN CYST REMOVAL Right 1993    open, Dr. Arce    TONSILECTOMY AND ADNOIDECTOMY      TONSILLECTOMY      WISDOM TOOTH EXTRACTION       The following portions of the patient's history were reviewed and updated as appropriate: allergies, past family history, past medical history, past social history, past surgical history, and problem list.  Review of Systems   Gastrointestinal:  Negative for bowel incontinence.   Genitourinary:  Negative for bladder incontinence.   Musculoskeletal:  Positive for back pain.     Physical Exam  Musculoskeletal:      Thoracic back: Spasms and  tenderness present. Decreased range of motion.      Lumbar back: Spasms and tenderness present. Decreased range of motion. Negative right straight leg raise test and negative left straight leg raise test.        Back:       Comments: Pnful and limited in Rrot 10, Blf 15(contralaterally), Ext 15, Ext/Brot   Skin:     General: Skin is warm and dry.   Neurological:      Mental Status: She is alert and oriented to person, place, and time.      Gait: Gait is intact.   Psychiatric:         Mood and Affect: Mood normal.         Behavior: Behavior normal.       SOFT TISSUE ASSESSMENT Hypertonicity and tenderness palpated L T 3-7, 10-S1 erector spinae, L hip flexor, L glute med/min, L QL, L hamstring JOINT RESTRICTIONS: T3-7, 10-S1, L R6, L R9, and L SIJ     Return in about 1 week (around 8/15/2024) for Next scheduled follow up.

## 2024-08-09 ENCOUNTER — APPOINTMENT (OUTPATIENT)
Dept: PHYSICAL THERAPY | Facility: CLINIC | Age: 49
End: 2024-08-09
Payer: OTHER MISCELLANEOUS

## 2024-08-09 ENCOUNTER — HOSPITAL ENCOUNTER (EMERGENCY)
Facility: HOSPITAL | Age: 49
Discharge: HOME | End: 2024-08-09
Attending: EMERGENCY MEDICINE
Payer: COMMERCIAL

## 2024-08-09 ENCOUNTER — OFFICE VISIT (OUTPATIENT)
Dept: INTERNAL MEDICINE CLINIC | Facility: CLINIC | Age: 49
End: 2024-08-09
Payer: COMMERCIAL

## 2024-08-09 VITALS
OXYGEN SATURATION: 99 % | RESPIRATION RATE: 18 BRPM | HEART RATE: 111 BPM | DIASTOLIC BLOOD PRESSURE: 89 MMHG | SYSTOLIC BLOOD PRESSURE: 150 MMHG | TEMPERATURE: 98.9 F

## 2024-08-09 VITALS — HEIGHT: 66 IN | WEIGHT: 158 LBS | HEART RATE: 112 BPM | BODY MASS INDEX: 25.39 KG/M2 | OXYGEN SATURATION: 97 %

## 2024-08-09 DIAGNOSIS — H05.012 CELLULITIS OF LEFT ORBITAL REGION: ICD-10-CM

## 2024-08-09 DIAGNOSIS — H00.036 CELLULITIS OF LEFT EYELID: Primary | ICD-10-CM

## 2024-08-09 DIAGNOSIS — H00.019 HORDEOLUM EXTERNUM (STYE): Primary | ICD-10-CM

## 2024-08-09 PROCEDURE — 99215 OFFICE O/P EST HI 40 MIN: CPT | Performed by: STUDENT IN AN ORGANIZED HEALTH CARE EDUCATION/TRAINING PROGRAM

## 2024-08-09 PROCEDURE — 99284 EMERGENCY DEPT VISIT MOD MDM: CPT | Performed by: EMERGENCY MEDICINE

## 2024-08-09 PROCEDURE — 99283 EMERGENCY DEPT VISIT LOW MDM: CPT

## 2024-08-09 RX ORDER — ERYTHROMYCIN 5 MG/G
OINTMENT OPHTHALMIC
Qty: 3.5 G | Refills: 0 | Status: SHIPPED | OUTPATIENT
Start: 2024-08-09 | End: 2024-08-16

## 2024-08-09 NOTE — DISCHARGE INSTRUCTIONS
You were seen today for a hordeolum which is a stye on the left eye    Please continue taking the antibiotics.  I sent over erythromycin ointment please use this twice daily    Please continue using warm compresses several times per day    Please return to emergency department if you develop any increased swelling, fever, chills, pain in your eye, pain with eye movements or any other symptoms that are concerning to you

## 2024-08-09 NOTE — PROGRESS NOTES
Ambulatory Visit  Name: Krys Alex      : 1975      MRN: [id number]  Encounter Provider: Mak Rust MD  Encounter Date: 2024   Encounter department: [facility] INTERNAL MEDICINE    Assessment & Plan   1. Infection of eyelid  -     tobramycin-dexamethasone (TOBRADEX) ophthalmic suspension; Administer 1 drop into the left eye every 4 (four) hours while awake  -     sulfamethoxazole-trimethoprim (BACTRIM DS) 800-160 mg per tablet; Take 1 tablet by mouth every 12 (twelve) hours for 7 days  2. Muscle spasm  -     methocarbamol (Robaxin-750) 750 mg tablet; Take one pill daily for muscle spasm         History of Present Illness     This 49-year-old female patient presents to our office today for evaluation of a acute infection of the upper eyelid of her left eye.  Patient indicates that she began to experience a swelling of the upper eyelid yesterday which has progressed as the day went on yesterday and today.  Is not aware of any trauma to the eye does not seem to have any actual irritation in the conjunctive of the eye.      Review of Systems   Eyes:         Swelling of the superior eyelid left eye consistent with cellulitis     Past Medical History:   Diagnosis Date    COVID-19 2022    Finger fracture, left     Intrinsic muscle tightness 2019    Pure hypercholesterolemia 10/14/2021    Trigger middle finger of left hand 2019    UTI symptoms 10/14/2021    Vitamin D deficiency      Past Surgical History:   Procedure Laterality Date    LAPAROSCOPIC OVARIAN CYSTECTOMY Right     Lysis of adhesions, Dr. Miranda    OVARIAN CYST REMOVAL Right     open, Dr. Arce    TONSILECTOMY AND ADNOIDECTOMY      TONSILLECTOMY      WISDOM TOOTH EXTRACTION       Family History   Problem Relation Age of Onset    Lung cancer Mother 72        non small cell    No Known Problems Father     No Known Problems Sister     No Known Problems Sister     No Known Problems Brother     No Known  Problems Daughter     No Known Problems Daughter     Hyperlipidemia Maternal Grandmother     Thyroid disease Maternal Grandmother     Lung cancer Maternal Grandmother 60    Coronary artery disease Maternal Grandfather     Hyperlipidemia Maternal Grandfather     Alcohol abuse Maternal Grandfather     Parkinsonism Maternal Grandfather     Heart disease Paternal Grandmother     Heart disease Paternal Grandfather     No Known Problems Maternal Aunt     No Known Problems Paternal Aunt     No Known Problems Paternal Uncle      Social History     Tobacco Use    Smoking status: Never    Smokeless tobacco: Never   Vaping Use    Vaping status: Never Used   Substance and Sexual Activity    Alcohol use: Yes     Alcohol/week: 2.0 standard drinks of alcohol     Types: 2 Glasses of wine per week     Comment: occasional    Drug use: Never    Sexual activity: Yes     Partners: Male     Birth control/protection: None, Male Sterilization     Current Outpatient Medications on File Prior to Visit   Medication Sig    butalbital-acetaminophen-caffeine (FIORICET,ESGIC) -40 mg per tablet Take 1 tablet by mouth every 6 (six) hours as needed for headaches    Cholecalciferol (VITAMIN D3 PO) Take by mouth    cyclobenzaprine (FLEXERIL) 10 mg tablet Take 10 mg by mouth daily at bedtime    diazepam (VALIUM) 5 mg tablet Take 2 tablets (10 mg total) by mouth every 8 (eight) hours as needed for anxiety (BACK SPASM) for up to 9 days    ibuprofen (MOTRIN) 800 mg tablet Take 1 tablet (800 mg total) by mouth 3 (three) times a day    loratadine (Claritin) 10 mg tablet     Triamcinolone Acetonide (Nasacort Allergy 24HR) 55 MCG/ACT nasal spray     meclizine (ANTIVERT) 12.5 MG tablet Take 1 tablet (12.5 mg total) by mouth 3 (three) times a day as needed for dizziness (Patient not taking: Reported on 12/4/2023)     No Known Allergies  Immunization History   Administered Date(s) Administered    COVID-19 MODERNA VACC 0.5 ML IM 01/04/2021, 02/09/2021     "COVID-19 PFIZER VACCINE 0.3 ML IM 01/15/2022    INFLUENZA 10/07/2022    Influenza Quadrivalent Preservative Free 3 years and older IM 10/06/2023     Objective     Pulse 87   Temp 97.9 °F (36.6 °C) (Tympanic)   Ht 5' 6\" (1.676 m)   Wt 71.9 kg (158 lb 9.6 oz)   LMP 03/01/2023 (Approximate)   SpO2 97%   BMI 25.60 kg/m²     Physical Exam  Eyes:      General:         Right eye: No discharge.         Left eye: No discharge.      Extraocular Movements: Extraocular movements intact.      Conjunctiva/sclera: Conjunctivae normal.      Pupils: Pupils are equal, round, and reactive to light.      Comments: Swelling of the upper eyelid left side consistent with cellulitis.   Neck:      Vascular: No carotid bruit.   Cardiovascular:      Rate and Rhythm: Regular rhythm.   Pulmonary:      Effort: Pulmonary effort is normal. No respiratory distress.      Breath sounds: No wheezing, rhonchi or rales.   Musculoskeletal:      Cervical back: Normal range of motion and neck supple. No rigidity or tenderness.   Lymphadenopathy:      Cervical: No cervical adenopathy.         "

## 2024-08-09 NOTE — ED ATTENDING ATTESTATION
8/9/2024  I, Marc Santos DO, saw and evaluated the patient. I have discussed the patient with the resident/non-physician practitioner and agree with the resident's/non-physician practitioner's findings, Plan of Care, and MDM as documented in the resident's/non-physician practitioner's note, except where noted. All available labs and Radiology studies were reviewed.  I was present for key portions of any procedure(s) performed by the resident/non-physician practitioner and I was immediately available to provide assistance.       At this point I agree with the current assessment done in the Emergency Department.  I have conducted an independent evaluation of this patient a history and physical is as follows:    50 yo woman presents for evaluation of L upper eyelid pain, swelling mostly over the lateral aspect of the upper lid. Has associated erythema extending toward medial canthus and involves the lower eyelid as well. Pt was started on TMP/SMX and tobramycin ophthalmic gtt by PMD, started last night. No fever. No change in vision. No diplopia, no pain with EOM. Does not use contact lenses. Traveling to [place] in 2 days.    No proptosis  EOMI, PERRL  No RAPD  No proptosis    OS:  Mild swelling upper lid over lateral lid area  Stray lash removed under slit lamp, but this was over medial upper lid - unrelated to area of swelling  Unable to alma lid due to swelling  No FB noted    Imp: hordeolum OS plan: warm compresses. Ok to continue TMP/SMX although unlikely to accelerate course of hordeolum. Will change tobramycin ophth gtt to erythromycin ophth oint. No concern for orbital cellulitis, does not need CT scan at this time.        ED Course         Critical Care Time  Procedures

## 2024-08-09 NOTE — ED PROVIDER NOTES
History  Chief Complaint   Patient presents with    Eye Swelling     Pt has c/o L eye swelling since Wednesday.     Krys Alex is a 49 y.o. female who presents to the Emergency department with left eye swelling. Patient reports a past medical history of none.  She says on Wednesday morning she woke up and noticed that her left eye really was a little red.  It got progressively worse throughout the day.  She had a doctor's appointment on Thursday where she was started on tobramycin and Bactrim.  Today she noticed she had redness underneath her eye as well and her doctor told her to come the emergency department.  She has not had any pain with extraocular movements or pain with bright light.  She has not had fevers or chills.  This has not happened her before.  She has not had any discharge from the eye.           Prior to Admission Medications   Prescriptions Last Dose Informant Patient Reported? Taking?   Cholecalciferol (VITAMIN D3 PO)  Self Yes No   Sig: Take by mouth   Triamcinolone Acetonide (Nasacort Allergy 24HR) 55 MCG/ACT nasal spray  Self Yes No   butalbital-acetaminophen-caffeine (FIORICET,ESGIC) -40 mg per tablet  Self No No   Sig: Take 1 tablet by mouth every 6 (six) hours as needed for headaches   cyclobenzaprine (FLEXERIL) 10 mg tablet   Yes No   Sig: Take 10 mg by mouth daily at bedtime   diazepam (VALIUM) 5 mg tablet  Self No No   Sig: Take 2 tablets (10 mg total) by mouth every 8 (eight) hours as needed for anxiety (BACK SPASM) for up to 9 days   ibuprofen (MOTRIN) 800 mg tablet  Self No No   Sig: Take 1 tablet (800 mg total) by mouth 3 (three) times a day   loratadine (Claritin) 10 mg tablet  Self Yes No   meclizine (ANTIVERT) 12.5 MG tablet  Self No No   Sig: Take 1 tablet (12.5 mg total) by mouth 3 (three) times a day as needed for dizziness   Patient not taking: Reported on 12/4/2023   methocarbamol (Robaxin-750) 750 mg tablet   No No   Sig: Take one pill daily for muscle spasm    sulfamethoxazole-trimethoprim (BACTRIM DS) 800-160 mg per tablet   No No   Sig: Take 1 tablet by mouth every 12 (twelve) hours for 7 days   tobramycin-dexamethasone (TOBRADEX) ophthalmic suspension   No No   Sig: Administer 1 drop into the left eye every 4 (four) hours while awake      Facility-Administered Medications: None       Past Medical History:   Diagnosis Date    COVID-19 05/2022    Finger fracture, left     Intrinsic muscle tightness 01/16/2019    Pure hypercholesterolemia 10/14/2021    Trigger middle finger of left hand 01/16/2019    UTI symptoms 10/14/2021    Vitamin D deficiency        Past Surgical History:   Procedure Laterality Date    LAPAROSCOPIC OVARIAN CYSTECTOMY Right 2001    Lysis of adhesions, Dr. Miranda    OVARIAN CYST REMOVAL Right 1993    open, Dr. Arce    TONSILECTOMY AND ADNOIDECTOMY      TONSILLECTOMY      WISDOM TOOTH EXTRACTION         Family History   Problem Relation Age of Onset    Lung cancer Mother 72        non small cell    No Known Problems Father     No Known Problems Sister     No Known Problems Sister     No Known Problems Brother     No Known Problems Daughter     No Known Problems Daughter     Hyperlipidemia Maternal Grandmother     Thyroid disease Maternal Grandmother     Lung cancer Maternal Grandmother 60    Coronary artery disease Maternal Grandfather     Hyperlipidemia Maternal Grandfather     Alcohol abuse Maternal Grandfather     Parkinsonism Maternal Grandfather     Heart disease Paternal Grandmother     Heart disease Paternal Grandfather     No Known Problems Maternal Aunt     No Known Problems Paternal Aunt     No Known Problems Paternal Uncle      I have reviewed and agree with the history as documented.    E-Cigarette/Vaping    E-Cigarette Use Never User      E-Cigarette/Vaping Substances    Nicotine No     THC No     CBD No     Flavoring No     Other No     Unknown No      Social History     Tobacco Use    Smoking status: Never    Smokeless tobacco: Never    Vaping Use    Vaping status: Never Used   Substance Use Topics    Alcohol use: Yes     Alcohol/week: 2.0 standard drinks of alcohol     Types: 2 Glasses of wine per week     Comment: occasional    Drug use: Never        Review of Systems   Constitutional:  Negative for chills and fever.   Respiratory:  Negative for cough and shortness of breath.    Cardiovascular:  Negative for chest pain and leg swelling.   Gastrointestinal:  Negative for abdominal pain.   Genitourinary:  Negative for dysuria.   Neurological:  Negative for seizures and syncope.       Physical Exam  ED Triage Vitals [08/09/24 1424]   Temperature Pulse Respirations Blood Pressure SpO2   98.9 °F (37.2 °C) (!) 111 18 150/89 99 %      Temp Source Heart Rate Source Patient Position - Orthostatic VS BP Location FiO2 (%)   Temporal Monitor Sitting Left arm --      Pain Score       8             Orthostatic Vital Signs  Vitals:    08/09/24 1424   BP: 150/89   Pulse: (!) 111   Patient Position - Orthostatic VS: Sitting       Physical Exam  Vitals reviewed.   Constitutional:       General: She is not in acute distress.     Appearance: Normal appearance. She is not ill-appearing, toxic-appearing or diaphoretic.   HENT:      Head: Normocephalic and atraumatic. No right periorbital erythema.        Comments: Fairly benign appearing left orbit.  No significant swelling.  She has erythema on her left upper eyelid but otherwise no significant swelling, redness and no discharge.     Nose: Nose normal.   Eyes:      General: No scleral icterus.        Right eye: No discharge.         Left eye: No discharge.      Extraocular Movements: Extraocular movements intact.      Pupils: Pupils are equal, round, and reactive to light.      Comments: No pain with extraocular movements.  Extraocular movements are full.  No pain on ophthalmologic exam.   Pulmonary:      Effort: Pulmonary effort is normal. No tachypnea, bradypnea or accessory muscle usage.   Abdominal:       General: Abdomen is flat. There is no distension.      Palpations: Abdomen is soft.      Tenderness: There is no abdominal tenderness. There is no guarding.   Musculoskeletal:      Right lower leg: No edema.      Left lower leg: No edema.   Skin:     General: Skin is warm and dry.      Capillary Refill: Capillary refill takes less than 2 seconds.   Neurological:      Mental Status: She is alert and oriented to person, place, and time.      GCS: GCS eye subscore is 4. GCS verbal subscore is 5. GCS motor subscore is 6.      Cranial Nerves: No dysarthria or facial asymmetry.   Psychiatric:         Mood and Affect: Mood normal.         Behavior: Behavior normal. Behavior is cooperative.         Thought Content: Thought content normal.         Judgment: Judgment normal.         ED Medications  Medications - No data to display    Diagnostic Studies  Results Reviewed       None                   No orders to display         Procedures  Procedures      ED Course                             SBIRT 20yo+      Flowsheet Row Most Recent Value   Initial Alcohol Screen: US AUDIT-C     1. How often do you have a drink containing alcohol? 0 Filed at: 08/09/2024 1426   2. How many drinks containing alcohol do you have on a typical day you are drinking?  0 Filed at: 08/09/2024 1426   3a. Male UNDER 65: How often do you have five or more drinks on one occasion? 0 Filed at: 08/09/2024 1426   3b. FEMALE Any Age, or MALE 65+: How often do you have 4 or more drinks on one occassion? 0 Filed at: 08/09/2024 1426   Audit-C Score 0 Filed at: 08/09/2024 1426   EDIN: How many times in the past year have you...    Used an illegal drug or used a prescription medication for non-medical reasons? Never Filed at: 08/09/2024 1426                  Medical Decision Making  Krys Alex is a 49 y.o. female who presents to the emergency department for left eyelid swelling    Based on patient's clinical history and physical exam there are no red flag  signs or symptoms     Differential diagnosis includes but is not limited to: Hordeolum, preseptal cellulitis.  At this time I doubt orbital cellulitis given her history and clinical exam.    We did a bedside slit-lamp exam which did not reveal any significant areas of swelling.    At time we can treat patient for hordeolum and preseptal cellulitis.  Patient was given erythromycin ointment in place of her tobramycin and was instructed to continue taking her antibiotics.  She was given appropriate return precautions and demonstrated understanding.      Risk  Prescription drug management.          Disposition  Final diagnoses:   Hordeolum externum (stye)     Time reflects when diagnosis was documented in both MDM as applicable and the Disposition within this note       Time User Action Codes Description Comment    8/9/2024  3:59 PM Jorge Luis Mansfield Add [L03.213] Preseptal cellulitis of right eye     8/9/2024  3:59 PM Jorge Luis Mansfield Remove [L03.213] Preseptal cellulitis of right eye     8/9/2024  3:59 PM Jorge Luis Mansfield Add [H00.019] Hordeolum externum (stye)           ED Disposition       ED Disposition   Discharge    Condition   Stable    Date/Time   Fri Aug 9, 2024 1559    Comment   Krys Alex discharge to home/self care.                   Follow-up Information    None         Discharge Medication List as of 8/9/2024  4:01 PM        START taking these medications    Details   erythromycin (ILOTYCIN) ophthalmic ointment Place a 1/2 inch ribbon of ointment into the lower eyelid twice per day, Normal           CONTINUE these medications which have NOT CHANGED    Details   butalbital-acetaminophen-caffeine (FIORICET,ESGIC) -40 mg per tablet Take 1 tablet by mouth every 6 (six) hours as needed for headaches, Starting Mon 8/5/2024, Normal      Cholecalciferol (VITAMIN D3 PO) Take by mouth, Historical Med      cyclobenzaprine (FLEXERIL) 10 mg tablet Take 10 mg by mouth daily at bedtime, Historical Med      diazepam  (VALIUM) 5 mg tablet Take 2 tablets (10 mg total) by mouth every 8 (eight) hours as needed for anxiety (BACK SPASM) for up to 9 days, Starting Fri 4/26/2024, Until Thu 8/8/2024 at 2359, Normal      ibuprofen (MOTRIN) 800 mg tablet Take 1 tablet (800 mg total) by mouth 3 (three) times a day, Starting Fri 4/26/2024, Normal      loratadine (Claritin) 10 mg tablet Historical Med      meclizine (ANTIVERT) 12.5 MG tablet Take 1 tablet (12.5 mg total) by mouth 3 (three) times a day as needed for dizziness, Starting Wed 10/18/2023, Normal      methocarbamol (Robaxin-750) 750 mg tablet Take one pill daily for muscle spasm, Normal      sulfamethoxazole-trimethoprim (BACTRIM DS) 800-160 mg per tablet Take 1 tablet by mouth every 12 (twelve) hours for 7 days, Starting Thu 8/8/2024, Until Thu 8/15/2024, Normal      tobramycin-dexamethasone (TOBRADEX) ophthalmic suspension Administer 1 drop into the left eye every 4 (four) hours while awake, Starting Thu 8/8/2024, Normal      Triamcinolone Acetonide (Nasacort Allergy 24HR) 55 MCG/ACT nasal spray Historical Med           No discharge procedures on file.    PDMP Review         Value Time User    PDMP Reviewed  Yes 8/5/2024  5:07 PM Brooke Rider DO             ED Provider  Attending physically available and evaluated Krys Alex. I managed the patient along with the ED Attending.    Electronically Signed by           Jorge Luis Mansfield MD  08/09/24 4750

## 2024-08-09 NOTE — PROGRESS NOTES
[facility]  OFF[facility]ISIT     PATIENT INFORMATION     Krys Alex   49 y.o. female   MRN: [id number]    ASSESSMENT/PLAN     1. Cellulitis of left eyelid  -     Transfer to other facility  2. Cellulitis of left orbital region     Patient was seen in office on 08/08/2024 for concern of infection of the eyelid of the left eye.  At that time, she states that she began to experience swelling of the upper eyelid on the left side the day prior, 08/07/2024.  She is not aware of any trauma to eye.  She did not have any injection to conjunctive of left eye as well.  Exam did appear to have swelling of the left upper eyelid consistent with a cellulitis.  She was prescribed tobramycin/dexamethasone ophthalmic eyedrops along with Bactrim.  She now presents back in office today on 08/09 for concern of worsening swelling in left eye.  She states she has not noticed swelling below her left eyelid she has had some crusting in her left eye.  She also states that she has felt like she was having mild visual disturbances in her left eye to.  There is also pain around left eye.  Patient did fill prescription for TobraDex and Bactrim.  She has taken 2 doses of Bactrim at this time.  Examination today showed marked erythema, edema, and pain to palpation around the left eye.  This now includes both left upper eyelid and left lower eyelid.  This is a notable change from examination on day prior.  I am concerned that this may be progressing to orbital cellulitis.  I recommended for patient to proceed to emergency department for stat CT scan to rule out orbital cellulitis along with blood work to evaluate for systemic signs of infection.  If CT scan positive for orbital cellulitis, patient may require inpatient IV antibiotics and this was explained to her.  I also explained that if this is orbital cellulitis or requires emergent treatment as it could rapidly progress and present risk to orbital mortality  including vision loss.  If CT scan negative for orbital cellulitis, patient should continue with prescribed medications.    HEALTH MAINTENANCE     Immunization History   Administered Date(s) Administered    COVID-19 MODERNA VACC 0.5 ML IM 01/04/2021, 02/09/2021    COVID-19 PFIZER VACCINE 0.3 ML IM 01/15/2022    INFLUENZA 10/07/2022    Influenza Quadrivalent Preservative Free 3 years and older IM 10/06/2023         CHIEF COMPLAINT     Chief Complaint   Patient presents with    Eye Problem     Stating it worst compared to yesterday , little drainage and crust       HISTORY OF PRESENT ILLNESS     Ms. Krys Alex is a 49-year-old female with past medical history of migraines, seasonal allergic rhinitis, vertigo.  Patient was seen in office on 08/08/2024 for concern of infection of the eyelid of the left eye.  At that time, she states that she began to experience swelling of the upper eyelid on the left side the day prior, 08/07/thousand 24.  She is not aware of any trauma to eye.  She did not have any injection to conjunctive of left eye as well.  Exam did appear to have swelling of the left upper eyelid consistent with a cellulitis.  She was prescribed tobramycin/dexamethasone ophthalmic eyedrops along with Bactrim.    She now presents back in office today on 08/09 for concern of worsening swelling in left eye. She states she has not noticed swelling below her left eyelid she has had some crusting in her left eye.  She also states that she has felt like she was having mild visual disturbances in her left eye too.  There is also pain around left eye. Patient did fill prescription for TobraDex and Bactrim.  She has taken 2 doses of Bactrim at this time and has been using TobraDex as prescribed. Examination today showed marked erythema, edema, and pain to palpation around the left eye.  This now includes both left upper eyelid and left lower eyelid.  This is a notable change from documented examination on day  "prior.    REVIEW OF SYSTEMS     Review of Systems   Constitutional:  Negative for chills, fatigue and fever.   HENT:  Negative for congestion, rhinorrhea and sore throat.    Eyes:  Positive for pain and visual disturbance. Negative for redness.   Respiratory:  Negative for cough, shortness of breath and wheezing.    Cardiovascular:  Negative for chest pain, palpitations and leg swelling.   Gastrointestinal:  Negative for abdominal distention, abdominal pain, constipation, diarrhea, nausea and vomiting.   Musculoskeletal:  Negative for neck pain and neck stiffness.   Skin:  Positive for color change. Negative for rash and wound.   Neurological:  Negative for dizziness, weakness, light-headedness and headaches.   Psychiatric/Behavioral:  Negative for agitation, behavioral problems and confusion.      OBJECTIVE     Vitals:    08/09/24 0922   Pulse: (!) 112   SpO2: 97%   Weight: 71.7 kg (158 lb)   Height: 5' 6\" (1.676 m)     Physical Exam  Constitutional:       General: She is not in acute distress.     Appearance: Normal appearance. She is not ill-appearing.   Eyes:      General: No visual field deficit.        Right eye: No foreign body, discharge or hordeolum.         Left eye: No foreign body, discharge or hordeolum.      Extraocular Movements: Extraocular movements intact.      Conjunctiva/sclera:      Right eye: Right conjunctiva is not injected. No exudate.     Left eye: Left conjunctiva is not injected. No exudate.     Comments: Significant erythema, edema in the left upper eyelid.  There is now also significant erythema and edema below the left eye.  Extraocular movements intact without pain.  However, there is tenderness to palpation.   Cardiovascular:      Rate and Rhythm: Normal rate and regular rhythm.      Pulses: Normal pulses.      Heart sounds: Normal heart sounds. No murmur heard.  Pulmonary:      Effort: Pulmonary effort is normal. No respiratory distress.      Breath sounds: Normal breath sounds. No " wheezing, rhonchi or rales.   Abdominal:      General: Abdomen is flat. Bowel sounds are normal. There is no distension.      Palpations: Abdomen is soft.      Tenderness: There is no abdominal tenderness.   Musculoskeletal:      Right lower leg: No edema.      Left lower leg: No edema.   Skin:     General: Skin is warm and dry.   Neurological:      Mental Status: She is alert and oriented to person, place, and time.   Psychiatric:         Mood and Affect: Mood normal.         Behavior: Behavior normal.         Thought Content: Thought content normal.       CURRENT MEDICATIONS     Current Outpatient Medications:     butalbital-acetaminophen-caffeine (FIORICET,ESGIC) -40 mg per tablet, Take 1 tablet by mouth every 6 (six) hours as needed for headaches, Disp: 15 tablet, Rfl: 0    Cholecalciferol (VITAMIN D3 PO), Take by mouth, Disp: , Rfl:     cyclobenzaprine (FLEXERIL) 10 mg tablet, Take 10 mg by mouth daily at bedtime, Disp: , Rfl:     diazepam (VALIUM) 5 mg tablet, Take 2 tablets (10 mg total) by mouth every 8 (eight) hours as needed for anxiety (BACK SPASM) for up to 9 days, Disp: 20 tablet, Rfl: 0    ibuprofen (MOTRIN) 800 mg tablet, Take 1 tablet (800 mg total) by mouth 3 (three) times a day, Disp: 21 tablet, Rfl: 0    loratadine (Claritin) 10 mg tablet, , Disp: , Rfl:     meclizine (ANTIVERT) 12.5 MG tablet, Take 1 tablet (12.5 mg total) by mouth 3 (three) times a day as needed for dizziness (Patient not taking: Reported on 12/4/2023), Disp: 30 tablet, Rfl: 0    methocarbamol (Robaxin-750) 750 mg tablet, Take one pill daily for muscle spasm, Disp: 10 tablet, Rfl: 0    sulfamethoxazole-trimethoprim (BACTRIM DS) 800-160 mg per tablet, Take 1 tablet by mouth every 12 (twelve) hours for 7 days, Disp: 14 tablet, Rfl: 0    tobramycin-dexamethasone (TOBRADEX) ophthalmic suspension, Administer 1 drop into the left eye every 4 (four) hours while awake, Disp: 2.5 mL, Rfl: 0    Triamcinolone Acetonide (Nasacort  Allergy 24HR) 55 MCG/ACT nasal spray, , Disp: , Rfl:     PAST MEDICAL & SURGICAL HISTORY     Past Medical History:   Diagnosis Date    COVID-19 05/2022    Finger fracture, left     Intrinsic muscle tightness 01/16/2019    Pure hypercholesterolemia 10/14/2021    Trigger middle finger of left hand 01/16/2019    UTI symptoms 10/14/2021    Vitamin D deficiency      Past Surgical History:   Procedure Laterality Date    LAPAROSCOPIC OVARIAN CYSTECTOMY Right 2001    Lysis of adhesions, Dr. Miranda    OVARIAN CYST REMOVAL Right 1993    open, Dr. Arce    TONSILECTOMY AND ADNOIDECTOMY      TONSILLECTOMY      WISDOM TOOTH EXTRACTION       SOCIAL & FAMILY HISTORY     Social History     Socioeconomic History    Marital status: Single     Spouse name: Not on file    Number of children: Not on file    Years of education: Not on file    Highest education level: Not on file   Occupational History    Not on file   Tobacco Use    Smoking status: Never    Smokeless tobacco: Never   Vaping Use    Vaping status: Never Used   Substance and Sexual Activity    Alcohol use: Yes     Alcohol/week: 2.0 standard drinks of alcohol     Types: 2 Glasses of wine per week     Comment: occasional    Drug use: Never    Sexual activity: Yes     Partners: Male     Birth control/protection: None, Male Sterilization   Other Topics Concern    Not on file   Social History Narrative    ** Merged History Encounter ** Lives with her 2 daughters    Works full time    Exercise: 4 days per week    Diet: balanced    Caffeine: 1-2 servings coffee daily        Med Surg nurse at [employer]     Social Determinants of Health     Financial Resource Strain: Not on file   Food Insecurity: Not on file   Transportation Needs: Not on file   Physical Activity: Not on file   Stress: Not on file   Social Connections: Not on file   Intimate Partner Violence: Not on file   Housing Stability: Not on file     Social History     Substance and Sexual Activity   Alcohol Use Yes     Alcohol/week: 2.0 standard drinks of alcohol    Types: 2 Glasses of wine per week    Comment: occasional       Social History     Substance and Sexual Activity   Drug Use Never     Social History     Tobacco Use   Smoking Status Never   Smokeless Tobacco Never     Family History   Problem Relation Age of Onset    Lung cancer Mother 72        non small cell    No Known Problems Father     No Known Problems Sister     No Known Problems Sister     No Known Problems Brother     No Known Problems Daughter     No Known Problems Daughter     Hyperlipidemia Maternal Grandmother     Thyroid disease Maternal Grandmother     Lung cancer Maternal Grandmother 60    Coronary artery disease Maternal Grandfather     Hyperlipidemia Maternal Grandfather     Alcohol abuse Maternal Grandfather     Parkinsonism Maternal Grandfather     Heart disease Paternal Grandmother     Heart disease Paternal Grandfather     No Known Problems Maternal Aunt     No Known Problems Paternal Aunt     No Known Problems Paternal Uncle      ==  Rex Hilario DO  [facility] Internal Medicine- [place] [address] [place] [address]  Office: [phone]

## 2024-08-09 NOTE — ASSESSMENT & PLAN NOTE
Cellulitis of the upper eyelid left eye no indication of conjunctivitis plan is for cool compresses for 10 to 15 minutes 2-3 times a day.  I have prescribed oral antibiotic of Bactrim 1 pill twice a day for 7 days and also eyedrops TobraDex 1 drop 4 times a day for 5 days.  Follow-up if symptoms worsen.

## 2024-08-20 ENCOUNTER — PROCEDURE VISIT (OUTPATIENT)
Age: 49
End: 2024-08-20
Payer: OTHER MISCELLANEOUS

## 2024-08-20 ENCOUNTER — OFFICE VISIT (OUTPATIENT)
Dept: PAIN MEDICINE | Facility: CLINIC | Age: 49
End: 2024-08-20
Payer: OTHER MISCELLANEOUS

## 2024-08-20 VITALS
HEART RATE: 86 BPM | BODY MASS INDEX: 25.39 KG/M2 | OXYGEN SATURATION: 96 % | SYSTOLIC BLOOD PRESSURE: 124 MMHG | HEIGHT: 66 IN | DIASTOLIC BLOOD PRESSURE: 70 MMHG | WEIGHT: 158 LBS

## 2024-08-20 VITALS
BODY MASS INDEX: 25.39 KG/M2 | HEIGHT: 66 IN | DIASTOLIC BLOOD PRESSURE: 78 MMHG | WEIGHT: 158 LBS | TEMPERATURE: 98.1 F | HEART RATE: 88 BPM | SYSTOLIC BLOOD PRESSURE: 126 MMHG

## 2024-08-20 DIAGNOSIS — M24.552 HIP FLEXOR TENDON TIGHTNESS, LEFT: ICD-10-CM

## 2024-08-20 DIAGNOSIS — M46.1 SACROILIITIS (HCC): ICD-10-CM

## 2024-08-20 DIAGNOSIS — S39.012A LUMBAR STRAIN, INITIAL ENCOUNTER: ICD-10-CM

## 2024-08-20 DIAGNOSIS — M79.18 MYOFASCIAL PAIN SYNDROME: Primary | ICD-10-CM

## 2024-08-20 DIAGNOSIS — G57.02 PIRIFORMIS SYNDROME OF LEFT SIDE: ICD-10-CM

## 2024-08-20 DIAGNOSIS — M51.16 LUMBAR DISC DISEASE WITH RADICULOPATHY: Primary | ICD-10-CM

## 2024-08-20 DIAGNOSIS — M99.02 SEGMENTAL DYSFUNCTION OF THORACIC REGION: ICD-10-CM

## 2024-08-20 DIAGNOSIS — M99.04 SEGMENTAL DYSFUNCTION OF SACRAL REGION: ICD-10-CM

## 2024-08-20 DIAGNOSIS — M99.08 SOMATIC DYSFUNCTION OF COSTOVERTEBRAL JOINT STRUCTURE: ICD-10-CM

## 2024-08-20 DIAGNOSIS — M51.36 DDD (DEGENERATIVE DISC DISEASE), LUMBAR: ICD-10-CM

## 2024-08-20 PROCEDURE — 97110 THERAPEUTIC EXERCISES: CPT | Performed by: CHIROPRACTOR

## 2024-08-20 PROCEDURE — 99213 OFFICE O/P EST LOW 20 MIN: CPT | Performed by: PHYSICIAN ASSISTANT

## 2024-08-20 PROCEDURE — 98941 CHIROPRACT MANJ 3-4 REGIONS: CPT | Performed by: CHIROPRACTOR

## 2024-08-20 NOTE — PROGRESS NOTES
Initial date of service: 7/10/24    Diagnoses and all orders for this visit:    Myofascial pain syndrome    Sacroiliitis (HCC)    Segmental dysfunction of sacral region    Segmental dysfunction of thoracic region    Somatic dysfunction of costovertebral joint structure    Hip flexor tendon tightness, left    DDD (degenerative disc disease), lumbar    Lumbar strain, initial encounter      Pt improved with reduced pain and increased ROM. Pt has upcoming appt with [facility]; expected to RTW; will continue to follow up here and progress her strengthening/stabilization at [facility]    TREATMENT: 37510, [id number]  Ther-ex: IASTM; discussed post procedure soreness and/or ecchymosis for up to 36 hrs, applied to affected mm hypertonicities; supine hamstring stretch, supine gluteal stretch, side laying QL stretch, single knee to chest stretch, hip flexor pin-and-stretch, transitional mvmt education, abdominal bracing; greater than 15 min spent performing above mentioned ther-ex to improve ROM/flexibility. Thoracic mobilization/manipulation: prone P-A mob, supine A-P manip; Lumbar mobilization/manipulation: diversified side laying graded HVLA, flexion-traction; SIJ Manipulation/Mobilization: L SIJ HVLA - long axis distraction, mack drop table maneuver to affected SIJ    HPI:  Krys Alex is a 49 y.o. female  Chief Complaint   Patient presents with    Back Pain     Lower back pain-3     Pt presents for tx fpor L sided neck to lower back pain after catching falling patient while bent and twisted to her right 4/26/24 while working for [employer]. Pt has undergone PT with modest benefit. Lumbar and Thoracic Spine MRIs 2024 demonstrate L4-L5: tiny right posterolateral annular fissure, small right foraminal disc protrusion. Mild facet arthropathy, trace facet joint effusions. Mild canal stenosis. Mild-to-moderate right and mild left foraminal narrowing L5-S1: Mild diffuse disc bulge, tiny right  posterolateral annular fissure. Pt was seen by Pain Mgmt who referred here for consutl/tx  8/20: Pt reports felt much better over vacation; mild flare-up with return trip home    Back Pain  This is a new problem. The current episode started more than 1 month ago. The problem occurs constantly. The problem has been waxing and waning since onset. The pain is present in the gluteal, lumbar spine, sacro-iliac and thoracic spine. The quality of the pain is described as aching. The pain does not radiate. The pain is Worse during the day. The symptoms are aggravated by twisting and bending (transitional mvmts, prolonged walking/standing, uphill/downhill, cobra stretch; palliative includes rest, massage, traction, laying supine). Pertinent negatives include no bladder incontinence or bowel incontinence.     Past Medical History:   Diagnosis Date    COVID-19 05/2022    Finger fracture, left     Intrinsic muscle tightness 01/16/2019    Pure hypercholesterolemia 10/14/2021    Trigger middle finger of left hand 01/16/2019    UTI symptoms 10/14/2021    Vitamin D deficiency       Past Surgical History:   Procedure Laterality Date    LAPAROSCOPIC OVARIAN CYSTECTOMY Right 2001    Lysis of adhesions, Dr. Miranda    OVARIAN CYST REMOVAL Right 1993    open, Dr. Arce    TONSILECTOMY AND ADNOIDECTOMY      TONSILLECTOMY      WISDOM TOOTH EXTRACTION       The following portions of the patient's history were reviewed and updated as appropriate: allergies, past family history, past medical history, past social history, past surgical history, and problem list.  Review of Systems   Gastrointestinal:  Negative for bowel incontinence.   Genitourinary:  Negative for bladder incontinence.   Musculoskeletal:  Positive for back pain.     Physical Exam  Musculoskeletal:      Thoracic back: Spasms and tenderness present. Decreased range of motion.      Lumbar back: Spasms and tenderness present. Decreased range of motion. Negative right straight leg raise  test and negative left straight leg raise test.        Back:       Comments: Pnful and limited in Rrot 10, Blf 15(contralaterally), Ext 15, Ext/Brot   Skin:     General: Skin is warm and dry.   Neurological:      Mental Status: She is alert and oriented to person, place, and time.      Gait: Gait is intact.   Psychiatric:         Mood and Affect: Mood normal.         Behavior: Behavior normal.       SOFT TISSUE ASSESSMENT Hypertonicity and tenderness palpated L T 3-7, 10-S1 erector spinae, L hip flexor, L glute med/min, L QL, L hamstring JOINT RESTRICTIONS: T3-7, 10-S1, L R6, L R9, and L SIJ     Return in about 1 week (around 8/27/2024) for Next scheduled follow up.

## 2024-08-20 NOTE — PROGRESS NOTES
Assessment:  1. Lumbar disc disease with radiculopathy    2. Sacroiliitis (HCC)    3. Piriformis syndrome of left side        Plan:  While the patient was in the office today, I did have a thorough conversation regarding their chronic pain syndrome, medication management, and treatment plan options.    I have reviewed the patient's lumbar spine and thoracic spine MRI on today's visit.  We discussed different interventional options, explaining that an epidural steroid injection would be indicated for a radiculopathy however her symptoms certainly seem consistent with sacroiliitis/sacroiliac joint dysfunction.  I provided the patient with literature on sacroiliac joint injection for consideration.  She does states that she wishes to hold off on injection therapy and continue with conservative efforts to include chiropractic care and she will be working on her own home exercise program.    Patient will follow-up with us on a as needed basis if the pain changes or worsens.    The patient was advised to contact the office should their symptoms worsen in the interim. The patient was agreeable and verbalized an understanding.        History of Present Illness:    The patient is a 49 y.o. female last seen on 7/10/2024 who presents for a follow up office visit in regards to low back pain that began as a result of a work-related injury in April 2024.  The patient currently reports low back pain, primarily left-sided with radiation into the left buttock and posterior thigh.  She rates her current pain a 4 out of 10 and describes it as a constant burning, dull, aching, sharp and shooting pain.  Patient has been attending chiropractic therapy and dry needling and overall she has noticed a mild amount of improvement.  She underwent the lumbar and thoracic spine MRI since her last visit and presents today to review this and discuss potential treatment options.    I have personally reviewed and/or updated the patient's past medical  history, past surgical history, family history, social history, current medications, allergies, and vital signs today.       Review of Systems:    Review of Systems   Respiratory:  Negative for shortness of breath.    Cardiovascular:  Negative for chest pain.   Gastrointestinal:  Negative for constipation, diarrhea, nausea and vomiting.   Musculoskeletal:  Positive for gait problem. Negative for arthralgias, joint swelling and myalgias.   Skin:  Negative for rash.   Neurological:  Negative for dizziness, seizures and weakness.   All other systems reviewed and are negative.        Past Medical History:   Diagnosis Date   • COVID-19 05/2022   • Finger fracture, left    • Intrinsic muscle tightness 01/16/2019   • Pure hypercholesterolemia 10/14/2021   • Trigger middle finger of left hand 01/16/2019   • UTI symptoms 10/14/2021   • Vitamin D deficiency        Past Surgical History:   Procedure Laterality Date   • LAPAROSCOPIC OVARIAN CYSTECTOMY Right 2001    Lysis of adhesions, Dr. Miranda   • OVARIAN CYST REMOVAL Right 1993    open, Dr. Arce   • TONSILECTOMY AND ADNOIDECTOMY     • TONSILLECTOMY     • WISDOM TOOTH EXTRACTION         Family History   Problem Relation Age of Onset   • Lung cancer Mother 72        non small cell   • No Known Problems Father    • No Known Problems Sister    • No Known Problems Sister    • No Known Problems Brother    • No Known Problems Daughter    • No Known Problems Daughter    • Hyperlipidemia Maternal Grandmother    • Thyroid disease Maternal Grandmother    • Lung cancer Maternal Grandmother 60   • Coronary artery disease Maternal Grandfather    • Hyperlipidemia Maternal Grandfather    • Alcohol abuse Maternal Grandfather    • Parkinsonism Maternal Grandfather    • Heart disease Paternal Grandmother    • Heart disease Paternal Grandfather    • No Known Problems Maternal Aunt    • No Known Problems Paternal Aunt    • No Known Problems Paternal Uncle        Social History     Occupational  "History   • Not on file   Tobacco Use   • Smoking status: Never   • Smokeless tobacco: Never   Vaping Use   • Vaping status: Never Used   Substance and Sexual Activity   • Alcohol use: Yes     Alcohol/week: 2.0 standard drinks of alcohol     Types: 2 Glasses of wine per week     Comment: occasional   • Drug use: Never   • Sexual activity: Yes     Partners: Male     Birth control/protection: None, Male Sterilization         Current Outpatient Medications:   •  butalbital-acetaminophen-caffeine (FIORICET,ESGIC) -40 mg per tablet, Take 1 tablet by mouth every 6 (six) hours as needed for headaches, Disp: 15 tablet, Rfl: 0  •  Cholecalciferol (VITAMIN D3 PO), Take by mouth, Disp: , Rfl:   •  cyclobenzaprine (FLEXERIL) 10 mg tablet, Take 10 mg by mouth daily at bedtime, Disp: , Rfl:   •  diazepam (VALIUM) 5 mg tablet, Take 2 tablets (10 mg total) by mouth every 8 (eight) hours as needed for anxiety (BACK SPASM) for up to 9 days, Disp: 20 tablet, Rfl: 0  •  ibuprofen (MOTRIN) 800 mg tablet, Take 1 tablet (800 mg total) by mouth 3 (three) times a day, Disp: 21 tablet, Rfl: 0  •  loratadine (Claritin) 10 mg tablet, , Disp: , Rfl:   •  tobramycin-dexamethasone (TOBRADEX) ophthalmic suspension, Administer 1 drop into the left eye every 4 (four) hours while awake, Disp: 2.5 mL, Rfl: 0  •  Triamcinolone Acetonide (Nasacort Allergy 24HR) 55 MCG/ACT nasal spray, , Disp: , Rfl:   •  meclizine (ANTIVERT) 12.5 MG tablet, Take 1 tablet (12.5 mg total) by mouth 3 (three) times a day as needed for dizziness (Patient not taking: Reported on 12/4/2023), Disp: 30 tablet, Rfl: 0    No Known Allergies    Physical Exam:    /78 (BP Location: Left arm, Patient Position: Sitting, Cuff Size: Standard)   Pulse 88   Temp 98.1 °F (36.7 °C)   Ht 5' 6\" (1.676 m)   Wt 71.7 kg (158 lb)   LMP 03/01/2023 (Approximate)   BMI 25.50 kg/m²     Constitutional:normal, well developed, well nourished, alert, in no distress and non-toxic and no " overt pain behavior.  Eyes:anicteric  HEENT:grossly intact  Pulmonary:even and unlabored  Cardiovascular:No edema or pitting edema present  Skin:Normal without rashes or lesions and well hydrated  Psychiatric:Mood and affect appropriate  Neurologic:Cranial Nerves II-XII grossly intact  Musculoskeletal: Stable gait without the use of assistive devices, tenderness palpation over the left sacroiliac joint, tenderness left piriformis.      Imaging  MRI LUMBAR SPINE WITHOUT CONTRAST     INDICATION: S39.012D: Strain of muscle, fascia and tendon of lower back, subsequent encounter.     COMPARISON: Same day MR thoracic spine without contrast. Entire spine radiograph 4/8/2016.     TECHNIQUE:  Multiplanar, multisequence imaging of the lumbar spine was performed. .        IMAGE QUALITY:  Diagnostic     FINDINGS:     VERTEBRAL BODIES:  There are 5 lumbar type vertebral bodies.  Normal alignment of the lumbar spine.  No spondylolysis or spondylolisthesis. No scoliosis.  No compression fracture.  Normal marrow signal is identified within the visualized bony structures.    No discrete marrow lesion.     SACRUM:  Normal signal within the sacrum. No evidence of insufficiency or stress fracture.     DISTAL CORD AND CONUS:  Normal size and signal within the distal cord and conus.     PARASPINAL SOFT TISSUES:  Paraspinal soft tissues are unremarkable.     LOWER THORACIC DISC SPACES:  Normal disc height and signal.  No disc herniation, canal stenosis or foraminal narrowing.     LUMBAR DISC SPACES: Mild multilevel disc desiccation.     L1-L2: Normal.     L2-L3: Normal.     L3-L4: Normal.     L4-L5: Mild diffuse disc bulge, tiny right posterolateral annular fissure, small right foraminal disc protrusion. Mild facet arthropathy, trace facet joint effusions. Mild canal stenosis. Mild-to-moderate right and mild left foraminal narrowing     L5-S1: Mild diffuse disc bulge, tiny right posterolateral annular fissure. No significant canal  stenosis or foraminal narrowing.     OTHER FINDINGS:  None.     IMPRESSION:     No acute abnormality of lumbar spine.     Multilevel degenerative changes of lumbar spine with varying degrees of canal stenosis (mild L4-L5) and foraminal narrowing (mild-to-moderate right L4-L5), as detailed above.     No orders to display         No orders of the defined types were placed in this encounter.

## 2024-08-22 ENCOUNTER — PROCEDURE VISIT (OUTPATIENT)
Age: 49
End: 2024-08-22
Payer: OTHER MISCELLANEOUS

## 2024-08-22 ENCOUNTER — APPOINTMENT (OUTPATIENT)
Dept: URGENT CARE | Age: 49
End: 2024-08-22
Payer: OTHER MISCELLANEOUS

## 2024-08-22 VITALS
SYSTOLIC BLOOD PRESSURE: 124 MMHG | DIASTOLIC BLOOD PRESSURE: 80 MMHG | HEIGHT: 66 IN | BODY MASS INDEX: 25.39 KG/M2 | HEART RATE: 77 BPM | WEIGHT: 158 LBS | OXYGEN SATURATION: 99 %

## 2024-08-22 DIAGNOSIS — M24.552 HIP FLEXOR TENDON TIGHTNESS, LEFT: ICD-10-CM

## 2024-08-22 DIAGNOSIS — M99.08 SOMATIC DYSFUNCTION OF COSTOVERTEBRAL JOINT STRUCTURE: ICD-10-CM

## 2024-08-22 DIAGNOSIS — S39.012A LUMBAR STRAIN, INITIAL ENCOUNTER: ICD-10-CM

## 2024-08-22 DIAGNOSIS — M99.04 SEGMENTAL DYSFUNCTION OF SACRAL REGION: ICD-10-CM

## 2024-08-22 DIAGNOSIS — M99.02 SEGMENTAL DYSFUNCTION OF THORACIC REGION: ICD-10-CM

## 2024-08-22 DIAGNOSIS — M79.18 MYOFASCIAL PAIN SYNDROME: Primary | ICD-10-CM

## 2024-08-22 DIAGNOSIS — M51.36 DDD (DEGENERATIVE DISC DISEASE), LUMBAR: ICD-10-CM

## 2024-08-22 PROCEDURE — 97110 THERAPEUTIC EXERCISES: CPT | Performed by: CHIROPRACTOR

## 2024-08-22 PROCEDURE — 99213 OFFICE O/P EST LOW 20 MIN: CPT | Performed by: PREVENTIVE MEDICINE

## 2024-08-22 PROCEDURE — 98941 CHIROPRACT MANJ 3-4 REGIONS: CPT | Performed by: CHIROPRACTOR

## 2024-08-22 NOTE — PROGRESS NOTES
Initial date of service: 7/10/24    Diagnoses and all orders for this visit:    Myofascial pain syndrome    Segmental dysfunction of sacral region    Segmental dysfunction of thoracic region    Somatic dysfunction of costovertebral joint structure    Hip flexor tendon tightness, left    DDD (degenerative disc disease), lumbar    Lumbar strain, initial encounter    No significant change since seen earlier this week; responded to tx with reduced pain and increased ROM    TREATMENT: 19075, [id number]  Ther-ex: IASTM; discussed post procedure soreness and/or ecchymosis for up to 36 hrs, applied to affected mm hypertonicities; supine hamstring stretch, supine gluteal stretch, side laying QL stretch, single knee to chest stretch, hip flexor pin-and-stretch, transitional mvmt education, abdominal bracing; greater than 15 min spent performing above mentioned ther-ex to improve ROM/flexibility. Thoracic mobilization/manipulation: prone P-A mob, supine A-P manip; Lumbar mobilization/manipulation: diversified side laying graded HVLA, flexion-traction; SIJ Manipulation/Mobilization: L SIJ HVLA - long axis distraction, mack drop table maneuver to affected SIJ    HPI:  Krys Alex is a 49 y.o. female  Chief Complaint   Patient presents with    Back Pain     Lower back pain-4     Pt presents for tx fpor L sided neck to lower back pain after catching falling patient while bent and twisted to her right 4/26/24 while working for [employer]. Pt has undergone PT with modest benefit. Lumbar and Thoracic Spine MRIs 2024 demonstrate L4-L5: tiny right posterolateral annular fissure, small right foraminal disc protrusion. Mild facet arthropathy, trace facet joint effusions. Mild canal stenosis. Mild-to-moderate right and mild left foraminal narrowing L5-S1: Mild diffuse disc bulge, tiny right posterolateral annular fissure. Pt was seen by Pain Mgmt who referred here for consutl/tx  8/22: Pt reports felt much better over  vacation; mild flare-up with return trip home    Back Pain  This is a new problem. The current episode started more than 1 month ago. The problem occurs constantly. The problem has been waxing and waning since onset. The pain is present in the gluteal, lumbar spine, sacro-iliac and thoracic spine. The quality of the pain is described as aching. The pain does not radiate. The pain is Worse during the day. The symptoms are aggravated by twisting and bending (transitional mvmts, prolonged walking/standing, uphill/downhill, cobra stretch; palliative includes rest, massage, traction, laying supine). Pertinent negatives include no bladder incontinence or bowel incontinence.     Past Medical History:   Diagnosis Date    COVID-19 05/2022    Finger fracture, left     Intrinsic muscle tightness 01/16/2019    Pure hypercholesterolemia 10/14/2021    Trigger middle finger of left hand 01/16/2019    UTI symptoms 10/14/2021    Vitamin D deficiency       Past Surgical History:   Procedure Laterality Date    LAPAROSCOPIC OVARIAN CYSTECTOMY Right 2001    Lysis of adhesions, Dr. Miranda    OVARIAN CYST REMOVAL Right 1993    open, Dr. Arce    TONSILECTOMY AND ADNOIDECTOMY      TONSILLECTOMY      WISDOM TOOTH EXTRACTION       The following portions of the patient's history were reviewed and updated as appropriate: allergies, past family history, past medical history, past social history, past surgical history, and problem list.  Review of Systems   Gastrointestinal:  Negative for bowel incontinence.   Genitourinary:  Negative for bladder incontinence.   Musculoskeletal:  Positive for back pain.     Physical Exam  Musculoskeletal:      Thoracic back: Spasms and tenderness present. Decreased range of motion.      Lumbar back: Spasms and tenderness present. Decreased range of motion. Negative right straight leg raise test and negative left straight leg raise test.        Back:       Comments: Pnful and limited in Rrot 10, Blf  15(contralaterally), Ext 15, Ext/Brot   Skin:     General: Skin is warm and dry.   Neurological:      Mental Status: She is alert and oriented to person, place, and time.      Gait: Gait is intact.   Psychiatric:         Mood and Affect: Mood normal.         Behavior: Behavior normal.       SOFT TISSUE ASSESSMENT Hypertonicity and tenderness palpated L T 3-7, 10-S1 erector spinae, L hip flexor, L glute med/min, L QL, L hamstring JOINT RESTRICTIONS: T3-7, 10-S1, L R6, L R9, and L SIJ     Return in about 1 week (around 8/29/2024) for Next scheduled follow up.

## 2024-08-27 ENCOUNTER — PROCEDURE VISIT (OUTPATIENT)
Age: 49
End: 2024-08-27
Payer: OTHER MISCELLANEOUS

## 2024-08-27 VITALS
HEIGHT: 66 IN | DIASTOLIC BLOOD PRESSURE: 76 MMHG | SYSTOLIC BLOOD PRESSURE: 118 MMHG | HEART RATE: 95 BPM | WEIGHT: 158 LBS | BODY MASS INDEX: 25.39 KG/M2 | OXYGEN SATURATION: 99 %

## 2024-08-27 DIAGNOSIS — M99.02 SEGMENTAL DYSFUNCTION OF THORACIC REGION: ICD-10-CM

## 2024-08-27 DIAGNOSIS — M79.18 MYOFASCIAL PAIN SYNDROME: Primary | ICD-10-CM

## 2024-08-27 DIAGNOSIS — M24.552 HIP FLEXOR TENDON TIGHTNESS, LEFT: ICD-10-CM

## 2024-08-27 DIAGNOSIS — M99.04 SEGMENTAL DYSFUNCTION OF SACRAL REGION: ICD-10-CM

## 2024-08-27 DIAGNOSIS — M51.36 DDD (DEGENERATIVE DISC DISEASE), LUMBAR: ICD-10-CM

## 2024-08-27 DIAGNOSIS — M99.08 SOMATIC DYSFUNCTION OF COSTOVERTEBRAL JOINT STRUCTURE: ICD-10-CM

## 2024-08-27 PROCEDURE — 97110 THERAPEUTIC EXERCISES: CPT | Performed by: CHIROPRACTOR

## 2024-08-27 PROCEDURE — 98941 CHIROPRACT MANJ 3-4 REGIONS: CPT | Performed by: CHIROPRACTOR

## 2024-08-29 ENCOUNTER — PROCEDURE VISIT (OUTPATIENT)
Age: 49
End: 2024-08-29
Payer: OTHER MISCELLANEOUS

## 2024-08-29 VITALS
SYSTOLIC BLOOD PRESSURE: 132 MMHG | WEIGHT: 158 LBS | HEART RATE: 73 BPM | BODY MASS INDEX: 25.39 KG/M2 | DIASTOLIC BLOOD PRESSURE: 78 MMHG | OXYGEN SATURATION: 99 % | HEIGHT: 66 IN

## 2024-08-29 DIAGNOSIS — M99.08 SOMATIC DYSFUNCTION OF COSTOVERTEBRAL JOINT STRUCTURE: ICD-10-CM

## 2024-08-29 DIAGNOSIS — M24.552 HIP FLEXOR TENDON TIGHTNESS, LEFT: ICD-10-CM

## 2024-08-29 DIAGNOSIS — M99.04 SEGMENTAL DYSFUNCTION OF SACRAL REGION: ICD-10-CM

## 2024-08-29 DIAGNOSIS — M79.18 MYOFASCIAL PAIN SYNDROME: Primary | ICD-10-CM

## 2024-08-29 DIAGNOSIS — S39.012A LUMBAR STRAIN, INITIAL ENCOUNTER: ICD-10-CM

## 2024-08-29 DIAGNOSIS — M51.36 DDD (DEGENERATIVE DISC DISEASE), LUMBAR: ICD-10-CM

## 2024-08-29 DIAGNOSIS — M99.02 SEGMENTAL DYSFUNCTION OF THORACIC REGION: ICD-10-CM

## 2024-08-29 PROCEDURE — 98941 CHIROPRACT MANJ 3-4 REGIONS: CPT | Performed by: CHIROPRACTOR

## 2024-08-29 PROCEDURE — 97110 THERAPEUTIC EXERCISES: CPT | Performed by: CHIROPRACTOR

## 2024-08-29 NOTE — PROGRESS NOTES
Initial date of service: 7/10/24    Diagnoses and all orders for this visit:    Myofascial pain syndrome    Segmental dysfunction of sacral region    Segmental dysfunction of thoracic region    Hip flexor tendon tightness, left    DDD (degenerative disc disease), lumbar    Somatic dysfunction of costovertebral joint structure    Pt suffered reported exacerbation; discussed being consistent with and progressing home strengthening/stabilization program; discussed work conditioning program and/or ramp up in allowable activities/hours with RTW    TREATMENT: 95656, [id number]  Ther-ex: IASTM; discussed post procedure soreness and/or ecchymosis for up to 36 hrs, applied to affected mm hypertonicities; supine hamstring stretch, supine gluteal stretch, side laying QL stretch, single knee to chest stretch, hip flexor pin-and-stretch, transitional mvmt education, abdominal bracing; greater than 15 min spent performing above mentioned ther-ex to improve ROM/flexibility. Thoracic mobilization/manipulation: prone P-A mob, supine A-P manip; Lumbar mobilization/manipulation: diversified side laying graded HVLA, flexion-traction; SIJ Manipulation/Mobilization: L SIJ HVLA - long axis distraction, mack drop table maneuver to affected SIJ    HPI:  Krys Alex is a 49 y.o. female  Chief Complaint   Patient presents with    Back Pain     Lower back pain-8     Pt presents for tx fpor L sided neck to lower back pain after catching falling patient while bent and twisted to her right 4/26/24 while working for [employer]. Pt has undergone PT with modest benefit. Lumbar and Thoracic Spine MRIs 2024 demonstrate L4-L5: tiny right posterolateral annular fissure, small right foraminal disc protrusion. Mild facet arthropathy, trace facet joint effusions. Mild canal stenosis. Mild-to-moderate right and mild left foraminal narrowing L5-S1: Mild diffuse disc bulge, tiny right posterolateral annular fissure. Pt was seen by Pain Mgmt  who referred here for consutl/tx  8/29: Pt reports flare-up     Back Pain  This is a new problem. The current episode started more than 1 month ago. The problem occurs constantly. The problem has been waxing and waning since onset. The pain is present in the gluteal, lumbar spine, sacro-iliac and thoracic spine. The quality of the pain is described as aching. The pain does not radiate. The pain is Worse during the day. The symptoms are aggravated by twisting and bending (transitional mvmts, prolonged walking/standing, uphill/downhill, cobra stretch; palliative includes rest, massage, traction, laying supine). Pertinent negatives include no bladder incontinence or bowel incontinence.     Past Medical History:   Diagnosis Date    COVID-19 05/2022    Finger fracture, left     Intrinsic muscle tightness 01/16/2019    Pure hypercholesterolemia 10/14/2021    Trigger middle finger of left hand 01/16/2019    UTI symptoms 10/14/2021    Vitamin D deficiency       Past Surgical History:   Procedure Laterality Date    LAPAROSCOPIC OVARIAN CYSTECTOMY Right 2001    Lysis of adhesions, Dr. Miranda    OVARIAN CYST REMOVAL Right 1993    open, Dr. Arce    TONSILECTOMY AND ADNOIDECTOMY      TONSILLECTOMY      WISDOM TOOTH EXTRACTION       The following portions of the patient's history were reviewed and updated as appropriate: allergies, past family history, past medical history, past social history, past surgical history, and problem list.  Review of Systems   Gastrointestinal:  Negative for bowel incontinence.   Genitourinary:  Negative for bladder incontinence.   Musculoskeletal:  Positive for back pain.     Physical Exam  Musculoskeletal:      Thoracic back: Spasms and tenderness present. Decreased range of motion.      Lumbar back: Spasms and tenderness present. Decreased range of motion. Negative right straight leg raise test and negative left straight leg raise test.        Back:       Comments: Pnful and limited in Rrot 10, Blf  15(contralaterally), Ext 15, Ext/Brot   Skin:     General: Skin is warm and dry.   Neurological:      Mental Status: She is alert and oriented to person, place, and time.      Gait: Gait is intact.   Psychiatric:         Mood and Affect: Mood normal.         Behavior: Behavior normal.       SOFT TISSUE ASSESSMENT Hypertonicity and tenderness palpated L T 3-7, 10-S1 erector spinae, L hip flexor, L glute med/min, L QL, L hamstring JOINT RESTRICTIONS: T3-7, 10-S1, L R6, L R9, and L SIJ     Return in about 1 week (around 9/3/2024) for Next scheduled follow up.

## 2024-08-29 NOTE — PROGRESS NOTES
Initial date of service: 7/10/24    Diagnoses and all orders for this visit:    Myofascial pain syndrome    Segmental dysfunction of sacral region    Segmental dysfunction of thoracic region    Hip flexor tendon tightness, left    DDD (degenerative disc disease), lumbar    Somatic dysfunction of costovertebral joint structure    Lumbar strain, initial encounter    Pt improved since last visit but discussed progression to injection to allow for further strengthening/stabilization without repeated flare-ups; discussed being consistent with and progressing home strengthening/stabilization program; discussed work conditioning program and/or ramp up in allowable activities/hours with RTW    TREATMENT: 57087, [id number]  Ther-ex: IASTM; discussed post procedure soreness and/or ecchymosis for up to 36 hrs, applied to affected mm hypertonicities; supine hamstring stretch, supine gluteal stretch, side laying QL stretch, single knee to chest stretch, hip flexor pin-and-stretch, transitional mvmt education, abdominal bracing; greater than 15 min spent performing above mentioned ther-ex to improve ROM/flexibility. Thoracic mobilization/manipulation: prone P-A mob, supine A-P manip; Lumbar mobilization/manipulation: diversified side laying graded HVLA, flexion-traction; SIJ Manipulation/Mobilization: L SIJ HVLA - long axis distraction, mack drop table maneuver to affected SIJ    HPI:  Krys Alex is a 49 y.o. female  Chief Complaint   Patient presents with    Back Pain     Lower back pain-5     Pt presents for tx fpor L sided neck to lower back pain after catching falling patient while bent and twisted to her right 4/26/24 while working for [employer]. Pt has undergone PT with modest benefit. Lumbar and Thoracic Spine MRIs 2024 demonstrate L4-L5: tiny right posterolateral annular fissure, small right foraminal disc protrusion. Mild facet arthropathy, trace facet joint effusions. Mild canal stenosis.  Mild-to-moderate right and mild left foraminal narrowing L5-S1: Mild diffuse disc bulge, tiny right posterolateral annular fissure. Pt was seen by Pain Mgmt who referred here for consutl/tx  8/29: Pt reports slightly better but discouraged that she suffers repeated flare-ups; fearful or returning to work full duty    Back Pain  This is a new problem. The current episode started more than 1 month ago. The problem occurs constantly. The problem has been waxing and waning since onset. The pain is present in the gluteal, lumbar spine, sacro-iliac and thoracic spine. The quality of the pain is described as aching. The pain does not radiate. The pain is Worse during the day. The symptoms are aggravated by twisting and bending (transitional mvmts, prolonged walking/standing, uphill/downhill, cobra stretch; palliative includes rest, massage, traction, laying supine). Pertinent negatives include no bladder incontinence or bowel incontinence.     Past Medical History:   Diagnosis Date    COVID-19 05/2022    Finger fracture, left     Intrinsic muscle tightness 01/16/2019    Pure hypercholesterolemia 10/14/2021    Trigger middle finger of left hand 01/16/2019    UTI symptoms 10/14/2021    Vitamin D deficiency       Past Surgical History:   Procedure Laterality Date    LAPAROSCOPIC OVARIAN CYSTECTOMY Right 2001    Lysis of adhesions, Dr. Miranda    OVARIAN CYST REMOVAL Right 1993    open, Dr. Arce    TONSILECTOMY AND ADNOIDECTOMY      TONSILLECTOMY      WISDOM TOOTH EXTRACTION       The following portions of the patient's history were reviewed and updated as appropriate: allergies, past family history, past medical history, past social history, past surgical history, and problem list.  Review of Systems   Gastrointestinal:  Negative for bowel incontinence.   Genitourinary:  Negative for bladder incontinence.   Musculoskeletal:  Positive for back pain.     Physical Exam  Musculoskeletal:      Thoracic back: Spasms and tenderness  present. Decreased range of motion.      Lumbar back: Spasms and tenderness present. Decreased range of motion. Negative right straight leg raise test and negative left straight leg raise test.        Back:       Comments: Pnful and limited in Rrot 10, Blf 15(contralaterally), Ext 15, Ext/Brot   Skin:     General: Skin is warm and dry.   Neurological:      Mental Status: She is alert and oriented to person, place, and time.      Gait: Gait is intact.   Psychiatric:         Mood and Affect: Mood normal.         Behavior: Behavior normal.       SOFT TISSUE ASSESSMENT Hypertonicity and tenderness palpated L T 3-7, 10-S1 erector spinae, L hip flexor, L glute med/min, L QL, L hamstring JOINT RESTRICTIONS: T3-7, 10-S1, L R6, L R9, and L SIJ     No follow-ups on file.

## 2024-08-30 ENCOUNTER — OFFICE VISIT (OUTPATIENT)
Dept: PHYSICAL THERAPY | Facility: CLINIC | Age: 49
End: 2024-08-30
Payer: OTHER MISCELLANEOUS

## 2024-08-30 DIAGNOSIS — S29.019D THORACIC MYOFASCIAL STRAIN, SUBSEQUENT ENCOUNTER: ICD-10-CM

## 2024-08-30 DIAGNOSIS — M54.16 LUMBAR RADICULOPATHY: ICD-10-CM

## 2024-08-30 DIAGNOSIS — S29.019A THORACIC MYOFASCIAL STRAIN, INITIAL ENCOUNTER: ICD-10-CM

## 2024-08-30 DIAGNOSIS — R10.2 PAIN OF PELVIC GIRDLE: Primary | ICD-10-CM

## 2024-08-30 PROCEDURE — 97112 NEUROMUSCULAR REEDUCATION: CPT

## 2024-08-30 PROCEDURE — 97140 MANUAL THERAPY 1/> REGIONS: CPT

## 2024-08-30 NOTE — PROGRESS NOTES
Daily Note     Today's date: 2024  Patient name: Krys Alex  : 1975  MRN: [id number]  Referring provider: Do Mccullough DO  Dx:   Encounter Diagnosis     ICD-10-CM    1. Pain of pelvic girdle  R10.2       2. Thoracic myofascial strain, subsequent encounter  S29.019D       3. Lumbar radiculopathy  M54.16       4. Thoracic myofascial strain, initial encounter  S29.019A           Start Time: 1030  Stop Time: 1115  Total time in clinic (min): 45 minutes    Subjective: Reports that she will be returning to work . Still pain in mid thoracic on left      Objective: See treatment diary below      Assessment: Tolerated treatment well. Patient would benefit from continued PT in order to increase tissue extensibility of left lower traps, rhomboids, piriformis, and glute med. HEP for thoracic mobility exercises.        Plan: Continue per plan of care.  DN glutes, piriformis, greater trochanter next session     Precautions: standard      Date: 24    3 4 5       Manuals          Dry needling   Forward flexion and supine to sit test  PEMF 10 min lvl 12 PEMF 10 min lvl 12 PEMF 10 min lvl 12    PEMF 10 min lvl 12 DN- see above TPR  b/l piriformis, QL, glutes, erector spinae and upper traps  TPR  b/l piriformis, QL, glutes, erector spinae and upper traps+ scalenes, suboccipitals DN- see above TPR b/l lower traps, rhomboids, piriformis    TPR b/ glutes, piriformis, erector spinae, upper trap  PEMF 10 min lvl 12        Gr V CTJ mobilization b/l          Cupping b/l QL and erector spinae    Cupping b/l QL and erector spinae                         Neuro Re-Ed          Neural reset          360 breathing          Bridge with right knee drop out           Gentle diaphragmatic breathing  Gentle diaphragmatic breathing Gentle diaphragmatic breathing  Gentle diaphragmatic breathing Gentle diaphragmatic breathing Gentle diaphragmatic breathing                                  Ther Ex          Standing hip abd/ ext          [place] pose with walk out          Cobbler's pose                                                            Ther Activity                              Gait Training                              Modalities                                Access Code: [email or web address]  URL: [email or web address]  Date: 08/30/2024  Prepared by: Isi Gauthier    Exercises  - Standing Thoracic Open Book at Wall  - 1 x daily - 7 x weekly - 3 sets - 10 reps  - Sidelying Open Book Thoracic Rotation with Knee on Foam Roll  - 1 x daily - 7 x weekly - 3 sets - 10 reps  - Seated Thoracic Lumbar Extension  - 1 x daily - 7 x weekly - 3 sets - 10 reps

## 2024-09-03 ENCOUNTER — PROCEDURE VISIT (OUTPATIENT)
Age: 49
End: 2024-09-03
Payer: OTHER MISCELLANEOUS

## 2024-09-03 VITALS
BODY MASS INDEX: 25.39 KG/M2 | OXYGEN SATURATION: 98 % | SYSTOLIC BLOOD PRESSURE: 126 MMHG | WEIGHT: 158 LBS | DIASTOLIC BLOOD PRESSURE: 70 MMHG | HEART RATE: 86 BPM | HEIGHT: 66 IN

## 2024-09-03 DIAGNOSIS — M99.04 SEGMENTAL DYSFUNCTION OF SACRAL REGION: Primary | ICD-10-CM

## 2024-09-03 DIAGNOSIS — M99.02 SEGMENTAL DYSFUNCTION OF THORACIC REGION: ICD-10-CM

## 2024-09-03 DIAGNOSIS — M79.18 MYOFASCIAL PAIN SYNDROME: ICD-10-CM

## 2024-09-03 DIAGNOSIS — M46.1 SACROILIITIS (HCC): ICD-10-CM

## 2024-09-03 DIAGNOSIS — M99.08 SOMATIC DYSFUNCTION OF COSTOVERTEBRAL JOINT STRUCTURE: ICD-10-CM

## 2024-09-03 DIAGNOSIS — S39.012A LUMBAR STRAIN, INITIAL ENCOUNTER: ICD-10-CM

## 2024-09-03 DIAGNOSIS — M24.552 HIP FLEXOR TENDON TIGHTNESS, LEFT: ICD-10-CM

## 2024-09-03 DIAGNOSIS — M51.36 DDD (DEGENERATIVE DISC DISEASE), LUMBAR: ICD-10-CM

## 2024-09-03 PROCEDURE — 98941 CHIROPRACT MANJ 3-4 REGIONS: CPT | Performed by: CHIROPRACTOR

## 2024-09-03 PROCEDURE — 97110 THERAPEUTIC EXERCISES: CPT | Performed by: CHIROPRACTOR

## 2024-09-03 NOTE — PROGRESS NOTES
Initial date of service: 7/10/24    Diagnoses and all orders for this visit:    Segmental dysfunction of sacral region    Hip flexor tendon tightness, left    Myofascial pain syndrome    DDD (degenerative disc disease), lumbar    Somatic dysfunction of costovertebral joint structure    Segmental dysfunction of thoracic region    Lumbar strain, initial encounter    Sacroiliitis (HCC)    Pt improved since last visit but still moderately symptomatic. Discussed progression to injections but patient declined; discussed at length to expect increased soreness with RTW    TREATMENT: 87952, [id number]  Ther-ex: IASTM; discussed post procedure soreness and/or ecchymosis for up to 36 hrs, applied to affected mm hypertonicities; supine hamstring stretch, supine gluteal stretch, side laying QL stretch, single knee to chest stretch, hip flexor pin-and-stretch, transitional mvmt education, abdominal bracing; greater than 15 min spent performing above mentioned ther-ex to improve ROM/flexibility. Thoracic mobilization/manipulation: prone P-A mob, supine A-P manip; Lumbar mobilization/manipulation: diversified side laying graded HVLA, flexion-traction; SIJ Manipulation/Mobilization: L SIJ HVLA - long axis distraction, mack drop table maneuver to affected SIJ    HPI:  Krys Alex is a 49 y.o. female  Chief Complaint   Patient presents with    Back Pain     Lower back pain-6     Pt presents for tx fpor L sided neck to lower back pain after catching falling patient while bent and twisted to her right 4/26/24 while working for [employer]. Pt has undergone PT with modest benefit. Lumbar and Thoracic Spine MRIs 2024 demonstrate L4-L5: tiny right posterolateral annular fissure, small right foraminal disc protrusion. Mild facet arthropathy, trace facet joint effusions. Mild canal stenosis. Mild-to-moderate right and mild left foraminal narrowing L5-S1: Mild diffuse disc bulge, tiny right posterolateral annular fissure. Pt  was seen by Pain Mgmt who referred here for consutl/tx  9/3: Pt reports again slightly better but discouraged that she suffers repeated flare-ups; fearful or returning to work full duty    Back Pain  This is a new problem. The current episode started more than 1 month ago. The problem occurs constantly. The problem has been waxing and waning since onset. The pain is present in the gluteal, lumbar spine, sacro-iliac and thoracic spine. The quality of the pain is described as aching. The pain does not radiate. The pain is Worse during the day. The symptoms are aggravated by twisting and bending (transitional mvmts, prolonged walking/standing, uphill/downhill, cobra stretch; palliative includes rest, massage, traction, laying supine). Pertinent negatives include no bladder incontinence or bowel incontinence.     Past Medical History:   Diagnosis Date    COVID-19 05/2022    Finger fracture, left     Intrinsic muscle tightness 01/16/2019    Pure hypercholesterolemia 10/14/2021    Trigger middle finger of left hand 01/16/2019    UTI symptoms 10/14/2021    Vitamin D deficiency       Past Surgical History:   Procedure Laterality Date    LAPAROSCOPIC OVARIAN CYSTECTOMY Right 2001    Lysis of adhesions, Dr. Miranda    OVARIAN CYST REMOVAL Right 1993    open, Dr. Arce    TONSILECTOMY AND ADNOIDECTOMY      TONSILLECTOMY      WISDOM TOOTH EXTRACTION       The following portions of the patient's history were reviewed and updated as appropriate: allergies, past family history, past medical history, past social history, past surgical history, and problem list.  Review of Systems   Gastrointestinal:  Negative for bowel incontinence.   Genitourinary:  Negative for bladder incontinence.   Musculoskeletal:  Positive for back pain.     Physical Exam  Musculoskeletal:      Thoracic back: Spasms and tenderness present. Decreased range of motion.      Lumbar back: Spasms and tenderness present. Decreased range of motion. Negative right straight  leg raise test and negative left straight leg raise test.        Back:       Comments: Pnful and limited in Rrot 10, Blf 15(contralaterally), Ext 15, Ext/Brot   Skin:     General: Skin is warm and dry.   Neurological:      Mental Status: She is alert and oriented to person, place, and time.      Gait: Gait is intact.   Psychiatric:         Mood and Affect: Mood normal.         Behavior: Behavior normal.       SOFT TISSUE ASSESSMENT Hypertonicity and tenderness palpated L T 3-7, 10-S1 erector spinae, L hip flexor, L glute med/min, L QL, L hamstring JOINT RESTRICTIONS: T3-7, 10-S1, L R6, L R9, and L SIJ     No follow-ups on file.

## 2024-09-05 ENCOUNTER — PROCEDURE VISIT (OUTPATIENT)
Age: 49
End: 2024-09-05
Payer: OTHER MISCELLANEOUS

## 2024-09-05 VITALS
BODY MASS INDEX: 25.39 KG/M2 | DIASTOLIC BLOOD PRESSURE: 78 MMHG | HEIGHT: 66 IN | OXYGEN SATURATION: 98 % | SYSTOLIC BLOOD PRESSURE: 114 MMHG | WEIGHT: 158 LBS | HEART RATE: 83 BPM

## 2024-09-05 DIAGNOSIS — M99.04 SEGMENTAL DYSFUNCTION OF SACRAL REGION: Primary | ICD-10-CM

## 2024-09-05 DIAGNOSIS — M51.36 DDD (DEGENERATIVE DISC DISEASE), LUMBAR: ICD-10-CM

## 2024-09-05 DIAGNOSIS — M99.02 SEGMENTAL DYSFUNCTION OF THORACIC REGION: ICD-10-CM

## 2024-09-05 DIAGNOSIS — S39.012A LUMBAR STRAIN, INITIAL ENCOUNTER: ICD-10-CM

## 2024-09-05 DIAGNOSIS — M24.552 HIP FLEXOR TENDON TIGHTNESS, LEFT: ICD-10-CM

## 2024-09-05 DIAGNOSIS — M99.08 SOMATIC DYSFUNCTION OF COSTOVERTEBRAL JOINT STRUCTURE: ICD-10-CM

## 2024-09-05 DIAGNOSIS — M79.18 MYOFASCIAL PAIN SYNDROME: ICD-10-CM

## 2024-09-05 PROCEDURE — 98941 CHIROPRACT MANJ 3-4 REGIONS: CPT | Performed by: CHIROPRACTOR

## 2024-09-05 PROCEDURE — 97110 THERAPEUTIC EXERCISES: CPT | Performed by: CHIROPRACTOR

## 2024-09-09 NOTE — PROGRESS NOTES
Initial date of service: 7/10/24    Diagnoses and all orders for this visit:    Segmental dysfunction of sacral region    Hip flexor tendon tightness, left    Myofascial pain syndrome    DDD (degenerative disc disease), lumbar    Somatic dysfunction of costovertebral joint structure    Segmental dysfunction of thoracic region    Lumbar strain, initial encounter    Pt improved since last visit but still moderately symptomatic. Discussed progression to injections but patient clarified that she's lashell coppola use that as last resort and is hopeful that conservative care will continue to provide improvement, albeit slowly    TREATMENT: 17823, 39648  Ther-ex: IASTM; discussed post procedure soreness and/or ecchymosis for up to 36 hrs, applied to affected mm hypertonicities; supine hamstring stretch, supine gluteal stretch, side laying QL stretch, single knee to chest stretch, hip flexor pin-and-stretch, transitional mvmt education, abdominal bracing; greater than 15 min spent performing above mentioned ther-ex to improve ROM/flexibility. Thoracic mobilization/manipulation: prone P-A mob, supine A-P manip; Lumbar mobilization/manipulation: diversified side laying graded HVLA, flexion-traction; SIJ Manipulation/Mobilization: L SIJ HVLA - long axis distraction, mack drop table maneuver to affected SIJ    HPI:  Krys Alex is a 49 y.o. 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 Pt has undergone PT with modest benefit. Lumbar and Thoracic Spine MRIs 2024 demonstrate L4-L5: tiny right posterolateral annular fissure, small right foraminal disc protrusion. Mild facet arthropathy, trace facet joint effusions. Mild canal stenosis. Mild-to-moderate right and mild left  foraminal narrowing L5-S1: Mild diffuse disc bulge, tiny right posterolateral annular fissure. Pt was seen by Pain Mgmt who referred here for consutl/tx  9/5: Pt reports again slightly better, particularly in lower back, but discouraged that she suffers repeated flare-ups; fearful or returning to work full duty    Back Pain  This is a new problem. The current episode started more than 1 month ago. The problem occurs constantly. The problem has been waxing and waning since onset. The pain is present in the gluteal, lumbar spine, sacro-iliac and thoracic spine. The quality of the pain is described as aching. The pain does not radiate. The pain is Worse during the day. The symptoms are aggravated by twisting and bending (transitional mvmts, prolonged walking/standing, uphill/downhill, cobra stretch; palliative includes rest, massage, traction, laying supine). Pertinent negatives include no bladder incontinence or bowel incontinence.   Shoulder Pain       Past Medical History:   Diagnosis Date    COVID-19 05/2022    Finger fracture, left     Intrinsic muscle tightness 01/16/2019    Pure hypercholesterolemia 10/14/2021    Trigger middle finger of left hand 01/16/2019    UTI symptoms 10/14/2021    Vitamin D deficiency       Past Surgical History:   Procedure Laterality Date    LAPAROSCOPIC OVARIAN CYSTECTOMY Right 2001    Lysis of adhesions, Dr. Miranda    OVARIAN CYST REMOVAL Right 1993    open, Dr. Arce    TONSILECTOMY AND ADNOIDECTOMY      TONSILLECTOMY      WISDOM TOOTH EXTRACTION       The following portions of the patient's history were reviewed and updated as appropriate: allergies, past family history, past medical history, past social history, past surgical history, and problem list.  Review of Systems   Gastrointestinal:  Negative for bowel incontinence.   Genitourinary:  Negative for bladder incontinence.   Musculoskeletal:  Positive for back pain.     Physical Exam  Musculoskeletal:      Thoracic back: Spasms and  tenderness present. Decreased range of motion.      Lumbar back: Spasms and tenderness present. Decreased range of motion. Negative right straight leg raise test and negative left straight leg raise test.        Back:       Comments: Pnful and limited in Rrot 10, Blf 15(contralaterally), Ext 15, Ext/Brot   Skin:     General: Skin is warm and dry.   Neurological:      Mental Status: She is alert and oriented to person, place, and time.      Gait: Gait is intact.   Psychiatric:         Mood and Affect: Mood normal.         Behavior: Behavior normal.       SOFT TISSUE ASSESSMENT Hypertonicity and tenderness palpated L T 3-7, 10-S1 erector spinae, L hip flexor, L glute med/min, L QL, L hamstring JOINT RESTRICTIONS: T3-7, 10-S1, L R6, L R9, and L SIJ     Return in about 1 week (around 9/12/2024) for Next scheduled follow up.

## 2024-09-10 ENCOUNTER — PROCEDURE VISIT (OUTPATIENT)
Age: 49
End: 2024-09-10
Payer: COMMERCIAL

## 2024-09-10 VITALS
HEIGHT: 66 IN | BODY MASS INDEX: 25.39 KG/M2 | DIASTOLIC BLOOD PRESSURE: 78 MMHG | WEIGHT: 158 LBS | OXYGEN SATURATION: 99 % | HEART RATE: 76 BPM | SYSTOLIC BLOOD PRESSURE: 124 MMHG

## 2024-09-10 DIAGNOSIS — S39.012A LUMBAR STRAIN, INITIAL ENCOUNTER: ICD-10-CM

## 2024-09-10 DIAGNOSIS — M51.36 DDD (DEGENERATIVE DISC DISEASE), LUMBAR: ICD-10-CM

## 2024-09-10 DIAGNOSIS — M99.04 SEGMENTAL DYSFUNCTION OF SACRAL REGION: Primary | ICD-10-CM

## 2024-09-10 DIAGNOSIS — M99.08 SOMATIC DYSFUNCTION OF COSTOVERTEBRAL JOINT STRUCTURE: ICD-10-CM

## 2024-09-10 DIAGNOSIS — M24.552 HIP FLEXOR TENDON TIGHTNESS, LEFT: ICD-10-CM

## 2024-09-10 DIAGNOSIS — M79.18 MYOFASCIAL PAIN SYNDROME: ICD-10-CM

## 2024-09-10 DIAGNOSIS — M99.02 SEGMENTAL DYSFUNCTION OF THORACIC REGION: ICD-10-CM

## 2024-09-10 PROCEDURE — 97110 THERAPEUTIC EXERCISES: CPT | Performed by: CHIROPRACTOR

## 2024-09-10 PROCEDURE — 98941 CHIROPRACT MANJ 3-4 REGIONS: CPT | Performed by: CHIROPRACTOR

## 2024-09-10 NOTE — PROGRESS NOTES
"Initial date of service: 7/10/24    Diagnoses and all orders for this visit:    Segmental dysfunction of sacral region    Hip flexor tendon tightness, left    Myofascial pain syndrome    DDD (degenerative disc disease), lumbar    Somatic dysfunction of costovertebral joint structure    Segmental dysfunction of thoracic region    Lumbar strain, initial encounter    Pt suffered expected flare-up of post onset mm soreness with return to physicality of work. Discussed although most symptoms left sided and disc bulge is right posterolateral, we will switch to extension biased for disc to attain necessary progress that has been limited. Discussed only stretching into extension \"until pressure\" to avoid exacerbation of SIJ/facet.    TREATMENT: 03778, 42363  Ther-ex: IASTM; discussed post procedure soreness and/or ecchymosis for up to 36 hrs, applied to affected mm hypertonicities; cobra stretch, standing extensions, hip flexor pin-and-stretch, transitional mvmt education, abdominal bracing; greater than 15 min spent performing above mentioned ther-ex to improve ROM/flexibility. Thoracic mobilization/manipulation: prone P-A mob, supine A-P manip; Lumbar mobilization/manipulation: diversified side laying graded HVLA, extension-traction; SIJ Manipulation/Mobilization: L SIJ HVLA - long axis distraction, mack drop table maneuver to affected SIJ    HPI:  Krys Alex is a 49 y.o. 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 Pt has undergone PT with modest benefit. Lumbar and Thoracic Spine MRIs 2024 demonstrate L4-L5: tiny right posterolateral annular fissure, small right foraminal disc protrusion. Mild facet arthropathy, trace facet joint effusions. Mild canal stenosis. Mild-to-moderate right and mild left " foraminal narrowing L5-S1: Mild diffuse disc bulge, tiny right posterolateral annular fissure. Pt was seen by Pain Mgmt who referred here for consutl/tx  9/10: Pt reports achy/stiff/sore after return to work but avoided the most physically demanding parts of job    Back Pain  This is a new problem. The current episode started more than 1 month ago. The problem occurs constantly. The problem has been waxing and waning since onset. The pain is present in the gluteal, lumbar spine, sacro-iliac and thoracic spine. The quality of the pain is described as aching. The pain does not radiate. The pain is Worse during the day. The symptoms are aggravated by twisting and bending (transitional mvmts, prolonged walking/standing, uphill/downhill, cobra stretch; palliative includes rest, massage, traction, laying supine). Pertinent negatives include no bladder incontinence or bowel incontinence.   Shoulder Pain       Past Medical History:   Diagnosis Date    COVID-19 05/2022    Finger fracture, left     Intrinsic muscle tightness 01/16/2019    Pure hypercholesterolemia 10/14/2021    Trigger middle finger of left hand 01/16/2019    UTI symptoms 10/14/2021    Vitamin D deficiency       Past Surgical History:   Procedure Laterality Date    LAPAROSCOPIC OVARIAN CYSTECTOMY Right 2001    Lysis of adhesions, Dr. Miranda    OVARIAN CYST REMOVAL Right 1993    open, Dr. Arce    TONSILECTOMY AND ADNOIDECTOMY      TONSILLECTOMY      WISDOM TOOTH EXTRACTION       The following portions of the patient's history were reviewed and updated as appropriate: allergies, past family history, past medical history, past social history, past surgical history, and problem list.  Review of Systems   Gastrointestinal:  Negative for bowel incontinence.   Genitourinary:  Negative for bladder incontinence.   Musculoskeletal:  Positive for back pain.     Physical Exam  Musculoskeletal:      Thoracic back: Spasms and tenderness present. Decreased range of motion.       Lumbar back: Spasms and tenderness present. Decreased range of motion. Negative right straight leg raise test and negative left straight leg raise test.        Back:       Comments: Pnful and limited in Rrot 10, Blf 15(contralaterally), Ext 15, Ext/Brot   Skin:     General: Skin is warm and dry.   Neurological:      Mental Status: She is alert and oriented to person, place, and time.      Gait: Gait is intact.   Psychiatric:         Mood and Affect: Mood normal.         Behavior: Behavior normal.       SOFT TISSUE ASSESSMENT Hypertonicity and tenderness palpated L T 3-7, 10-S1 erector spinae, L hip flexor, L glute med/min, L QL, L hamstring JOINT RESTRICTIONS: T3-7, 10-S1, L R6, L R9, and L SIJ     Return in about 1 week (around 9/17/2024) for Next scheduled follow up.

## 2024-09-11 ENCOUNTER — OFFICE VISIT (OUTPATIENT)
Dept: PHYSICAL THERAPY | Facility: CLINIC | Age: 49
End: 2024-09-11
Payer: OTHER MISCELLANEOUS

## 2024-09-11 DIAGNOSIS — S29.019A THORACIC MYOFASCIAL STRAIN, INITIAL ENCOUNTER: ICD-10-CM

## 2024-09-11 DIAGNOSIS — R10.2 PAIN OF PELVIC GIRDLE: Primary | ICD-10-CM

## 2024-09-11 DIAGNOSIS — S29.019D THORACIC MYOFASCIAL STRAIN, SUBSEQUENT ENCOUNTER: ICD-10-CM

## 2024-09-11 DIAGNOSIS — M54.16 LUMBAR RADICULOPATHY: ICD-10-CM

## 2024-09-11 PROCEDURE — 97140 MANUAL THERAPY 1/> REGIONS: CPT

## 2024-09-11 PROCEDURE — 97112 NEUROMUSCULAR REEDUCATION: CPT

## 2024-09-11 NOTE — PROGRESS NOTES
PT Discharge/ Daily Note     Today's date: 2024  Patient name: Krys Alex  : 1975  MRN: [id number]  Referring provider: Do Mccullough DO  Dx:   Encounter Diagnosis     ICD-10-CM    1. Pain of pelvic girdle  R10.2       2. Thoracic myofascial strain, subsequent encounter  S29.019D       3. Lumbar radiculopathy  M54.16       4. 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 Patient  is discharged to [facility].   Discussed coming back any time she feels that PT would be beneficial. She has had longer periods of relief after treatment with lasting carry over.   Pt verbally consented to dry needling treatment session. Risks/benefits/aftercare instructions reviewed. All questions/concerns addressed.  Pt in prone position. Hand hygiene performed pre and post DN treatment session. Placement of needles in pathological tissue.   6 erector spinae, 10 sacrum, 10 glutes, 2 piriformis (needle location).  Total treatment duration to include set up/break down/in situ: 40 minutes. Patient was supervised by clinician throughout entirety of treatment session to include when DN in situ; clinician next to patient. All needles counted upon insertion and removal-- 28 needles. All accounted for and disposed in appropriate sharp container.     No adverse reaction to treatment. Pt advised may experience muscular fatigue and soreness. Pt verbalized understanding.        Plan: Continue per plan of care.      Precautions: standard      Date: 24    3 4 5        Manuals           Dry needling   Forward flexion and supine to sit test  PEMF 10 min lvl 12 PEMF 10 min lvl 12 PEMF 10 min lvl 12     PEMF 10 min lvl 12 DN- see above TPR  b/l piriformis, QL, glutes, erector spinae and upper traps   TPR  b/l piriformis, QL, glutes, erector spinae and upper traps+ scalenes, suboccipitals DN- see above TPR b/l lower traps, rhomboids, piriformis DN- see above    TPR b/ glutes, piriformis, erector spinae, upper trap  PEMF 10 min lvl 12         Gr V CTJ mobilization b/l           Cupping b/l QL and erector spinae    Cupping b/l QL and erector spinae                            Neuro Re-Ed           Neural reset           360 breathing           Bridge with right knee drop out            Gentle diaphragmatic breathing  Gentle diaphragmatic breathing Gentle diaphragmatic breathing  Gentle diaphragmatic breathing Gentle diaphragmatic breathing Gentle diaphragmatic breathing Gentle diaphragmatic breathing                                    Ther Ex           Standing hip abd/ ext           [place] pose with walk out           Cobbler's pose                                                                  Ther Activity                                 Gait Training                                 Modalities                                   Access Code: [email or web address]  URL: [email or web address]  Date: 08/30/2024  Prepared by: Isi Underwood-Shemar    Exercises  - Standing Thoracic Open Book at Wall  - 1 x daily - 7 x weekly - 3 sets - 10 reps  - Sidelying Open Book Thoracic Rotation with Knee on Foam Roll  - 1 x daily - 7 x weekly - 3 sets - 10 reps  - Seated Thoracic Lumbar Extension  - 1 x daily - 7 x weekly - 3 sets - 10 reps

## 2024-09-12 ENCOUNTER — APPOINTMENT (OUTPATIENT)
Dept: PHYSICAL THERAPY | Facility: CLINIC | Age: 49
End: 2024-09-12
Payer: OTHER MISCELLANEOUS

## 2024-09-12 ENCOUNTER — PROCEDURE VISIT (OUTPATIENT)
Age: 49
End: 2024-09-12
Payer: COMMERCIAL

## 2024-09-12 VITALS
DIASTOLIC BLOOD PRESSURE: 70 MMHG | HEART RATE: 77 BPM | SYSTOLIC BLOOD PRESSURE: 120 MMHG | OXYGEN SATURATION: 99 % | WEIGHT: 158 LBS | BODY MASS INDEX: 25.39 KG/M2 | HEIGHT: 66 IN

## 2024-09-12 DIAGNOSIS — M99.02 SEGMENTAL DYSFUNCTION OF THORACIC REGION: ICD-10-CM

## 2024-09-12 DIAGNOSIS — M99.04 SEGMENTAL DYSFUNCTION OF SACRAL REGION: Primary | ICD-10-CM

## 2024-09-12 DIAGNOSIS — M79.18 MYOFASCIAL PAIN SYNDROME: ICD-10-CM

## 2024-09-12 DIAGNOSIS — M24.552 HIP FLEXOR TENDON TIGHTNESS, LEFT: ICD-10-CM

## 2024-09-12 DIAGNOSIS — M99.08 SOMATIC DYSFUNCTION OF COSTOVERTEBRAL JOINT STRUCTURE: ICD-10-CM

## 2024-09-12 DIAGNOSIS — M51.36 DDD (DEGENERATIVE DISC DISEASE), LUMBAR: ICD-10-CM

## 2024-09-12 PROCEDURE — 97110 THERAPEUTIC EXERCISES: CPT | Performed by: CHIROPRACTOR

## 2024-09-12 PROCEDURE — 98941 CHIROPRACT MANJ 3-4 REGIONS: CPT | Performed by: CHIROPRACTOR

## 2024-09-12 NOTE — PROGRESS NOTES
Initial date of service: 7/10/24    Diagnoses and all orders for this visit:    Segmental dysfunction of sacral region    DDD (degenerative disc disease), lumbar    Hip flexor tendon tightness, left    Somatic dysfunction of costovertebral joint structure    Myofascial pain syndrome    Segmental dysfunction of thoracic region    Pt slightly improved; pt proceeding with scheduling injections    TREATMENT: 56604, 33654  Ther-ex: IASTM; discussed post procedure soreness and/or ecchymosis for up to 36 hrs, applied to affected mm hypertonicities; cobra stretch, standing extensions, hip flexor pin-and-stretch, transitional mvmt education, abdominal bracing; greater than 15 min spent performing above mentioned ther-ex to improve ROM/flexibility. Thoracic mobilization/manipulation: prone P-A mob, supine A-P manip; Lumbar mobilization/manipulation: diversified side laying graded HVLA, extension-traction; SIJ Manipulation/Mobilization: L SIJ HVLA - long axis distraction, mack drop table maneuver to affected SIJ    HPI:  Krys Alex is a 49 y.o. female  Chief Complaint   Patient presents with    Back Pain     Lower back pain-7     Pt presents for tx fpor L sided neck to lower back pain after catching falling patient while bent and twisted to her right 4/26/24 while working for [employer]. Pt has undergone PT with modest benefit. Lumbar and Thoracic Spine MRIs 2024 demonstrate L4-L5: tiny right posterolateral annular fissure, small right foraminal disc protrusion. Mild facet arthropathy, trace facet joint effusions. Mild canal stenosis. Mild-to-moderate right and mild left foraminal narrowing L5-S1: Mild diffuse disc bulge, tiny right posterolateral annular fissure. Pt was seen by Pain Mgmt who referred here for consutl/tx  9/12: Pt reports slightly better; had dry needling done yesterday and not as sore as usual    Back Pain  This is a new problem. The current episode started more than 1 month ago. The  problem occurs constantly. The problem has been waxing and waning since onset. The pain is present in the gluteal, lumbar spine, sacro-iliac and thoracic spine. The quality of the pain is described as aching. The pain does not radiate. The pain is Worse during the day. The symptoms are aggravated by twisting and bending (transitional mvmts, prolonged walking/standing, uphill/downhill, cobra stretch; palliative includes rest, massage, traction, laying supine). Pertinent negatives include no bladder incontinence or bowel incontinence.   Shoulder Pain       Past Medical History:   Diagnosis Date    COVID-19 05/2022    Finger fracture, left     Intrinsic muscle tightness 01/16/2019    Pure hypercholesterolemia 10/14/2021    Trigger middle finger of left hand 01/16/2019    UTI symptoms 10/14/2021    Vitamin D deficiency       Past Surgical History:   Procedure Laterality Date    LAPAROSCOPIC OVARIAN CYSTECTOMY Right 2001    Lysis of adhesions, Dr. Miranda    OVARIAN CYST REMOVAL Right 1993    open, Dr. Arce    TONSILECTOMY AND ADNOIDECTOMY      TONSILLECTOMY      WISDOM TOOTH EXTRACTION       The following portions of the patient's history were reviewed and updated as appropriate: allergies, past family history, past medical history, past social history, past surgical history, and problem list.  Review of Systems   Gastrointestinal:  Negative for bowel incontinence.   Genitourinary:  Negative for bladder incontinence.   Musculoskeletal:  Positive for back pain.     Physical Exam  Musculoskeletal:      Thoracic back: Spasms and tenderness present. Decreased range of motion.      Lumbar back: Spasms and tenderness present. Decreased range of motion. Negative right straight leg raise test and negative left straight leg raise test.        Back:       Comments: Pnful and limited in Rrot 10, Blf 15(contralaterally), Ext 15, Ext/Brot   Skin:     General: Skin is warm and dry.   Neurological:      Mental Status: She is alert and  oriented to person, place, and time.      Gait: Gait is intact.   Psychiatric:         Mood and Affect: Mood normal.         Behavior: Behavior normal.       SOFT TISSUE ASSESSMENT Hypertonicity and tenderness palpated L T 3-7, 10-S1 erector spinae, L hip flexor, L glute med/min, L QL, L hamstring JOINT RESTRICTIONS: T3-7, 10-S1, L R6, L R9, and L SIJ     Return in about 1 week (around 9/19/2024) for Next scheduled follow up.

## 2024-09-18 DIAGNOSIS — G43.909 MIGRAINE HEADACHE: ICD-10-CM

## 2024-09-18 RX ORDER — BUTALBITAL, ACETAMINOPHEN AND CAFFEINE 50; 325; 40 MG/1; MG/1; MG/1
1 TABLET ORAL EVERY 6 HOURS PRN
Qty: 15 TABLET | Refills: 0 | Status: SHIPPED | OUTPATIENT
Start: 2024-09-18

## 2024-10-01 ENCOUNTER — PROCEDURE VISIT (OUTPATIENT)
Age: 49
End: 2024-10-01
Payer: COMMERCIAL

## 2024-10-01 VITALS
BODY MASS INDEX: 25.39 KG/M2 | OXYGEN SATURATION: 99 % | HEART RATE: 101 BPM | DIASTOLIC BLOOD PRESSURE: 76 MMHG | WEIGHT: 158 LBS | HEIGHT: 66 IN | SYSTOLIC BLOOD PRESSURE: 112 MMHG

## 2024-10-01 DIAGNOSIS — M99.04 SEGMENTAL DYSFUNCTION OF SACRAL REGION: Primary | ICD-10-CM

## 2024-10-01 DIAGNOSIS — M24.552 HIP FLEXOR TENDON TIGHTNESS, LEFT: ICD-10-CM

## 2024-10-01 DIAGNOSIS — M51.360 DEGENERATION OF INTERVERTEBRAL DISC OF LUMBAR REGION WITH DISCOGENIC BACK PAIN: ICD-10-CM

## 2024-10-01 DIAGNOSIS — M99.08 SOMATIC DYSFUNCTION OF COSTOVERTEBRAL JOINT STRUCTURE: ICD-10-CM

## 2024-10-01 DIAGNOSIS — M99.02 SEGMENTAL DYSFUNCTION OF THORACIC REGION: ICD-10-CM

## 2024-10-01 DIAGNOSIS — M79.18 MYOFASCIAL PAIN SYNDROME: ICD-10-CM

## 2024-10-01 PROCEDURE — 97110 THERAPEUTIC EXERCISES: CPT | Performed by: CHIROPRACTOR

## 2024-10-01 PROCEDURE — 98941 CHIROPRACT MANJ 3-4 REGIONS: CPT | Performed by: CHIROPRACTOR

## 2024-10-01 NOTE — PROGRESS NOTES
Initial date of service: 7/10/24    Diagnoses and all orders for this visit:    Segmental dysfunction of sacral region    Degeneration of intervertebral disc of lumbar region with discogenic back pain    Hip flexor tendon tightness, left    Somatic dysfunction of costovertebral joint structure    Myofascial pain syndrome    Segmental dysfunction of thoracic region    Pt's lbp improved but neck/mbp with headache exacerbation; responded well to tx with reduced pain and increased ROM    TREATMENT: [id number], [id number]  Ther-ex: IASTM; discussed post procedure soreness and/or ecchymosis for up to 36 hrs, applied to affected mm hypertonicities; cobra stretch, standing extensions, hip flexor pin-and-stretch, transitional mvmt education, abdominal bracing; greater than 15 min spent performing above mentioned ther-ex to improve ROM/flexibility. Thoracic mobilization/manipulation: prone P-A mob, supine A-P manip; Lumbar mobilization/manipulation: diversified side laying graded HVLA, extension-traction; SIJ Manipulation/Mobilization: L SIJ HVLA - long axis distraction, mack drop table maneuver to affected SIJ    HPI:  Krys Alex is a 49 y.o. 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 Pt has undergone PT with modest benefit. Lumbar and Thoracic Spine MRIs 2024 demonstrate L4-L5: tiny right posterolateral annular fissure, small right foraminal disc protrusion. Mild facet arthropathy, trace facet joint effusions. Mild canal stenosis. Mild-to-moderate right and mild left foraminal narrowing L5-S1: Mild diffuse disc bulge, tiny right posterolateral annular fissure. Pt was seen by Pain Mgmt who referred here for consutl/tx  10/1: Pt reports stiff/sore from RTW;  headache and neck pain worse; lbp better overall    Back Pain  This is a new problem. The current episode started more than 1 month ago. The problem occurs constantly. The problem has been waxing and waning since onset. The pain is present in the gluteal, lumbar spine, sacro-iliac and thoracic spine. The quality of the pain is described as aching. The pain does not radiate. The pain is Worse during the day. The symptoms are aggravated by twisting and bending (transitional mvmts, prolonged walking/standing, uphill/downhill, cobra stretch; palliative includes rest, massage, traction, laying supine). Pertinent negatives include no bladder incontinence or bowel incontinence.   Shoulder Pain     Neck Pain       Past Medical History:   Diagnosis Date    COVID-19 05/2022    Finger fracture, left     Intrinsic muscle tightness 01/16/2019    Pure hypercholesterolemia 10/14/2021    Trigger middle finger of left hand 01/16/2019    UTI symptoms 10/14/2021    Vitamin D deficiency       Past Surgical History:   Procedure Laterality Date    LAPAROSCOPIC OVARIAN CYSTECTOMY Right 2001    Lysis of adhesions, Dr. Miranda    OVARIAN CYST REMOVAL Right 1993    open, Dr. Arce    TONSILECTOMY AND ADNOIDECTOMY      TONSILLECTOMY      WISDOM TOOTH EXTRACTION       The following portions of the patient's history were reviewed and updated as appropriate: allergies, past family history, past medical history, past social history, past surgical history, and problem list.  Review of Systems   Gastrointestinal:  Negative for bowel incontinence.   Genitourinary:  Negative for bladder incontinence.   Musculoskeletal:  Positive for back pain and neck pain.     Physical Exam  Musculoskeletal:      Thoracic back: Spasms and tenderness present. Decreased range of motion.      Lumbar back: Spasms and tenderness present. Decreased range of motion. Negative right straight leg raise test and negative left straight leg raise test.        Back:       Comments:  Pnful and limited in Rrot 10, Blf 15(contralaterally), Ext 15, Ext/Brot   Skin:     General: Skin is warm and dry.   Neurological:      Mental Status: She is alert and oriented to person, place, and time.      Gait: Gait is intact.   Psychiatric:         Mood and Affect: Mood normal.         Behavior: Behavior normal.       SOFT TISSUE ASSESSMENT Hypertonicity and tenderness palpated L T 3-7, 10-S1 erector spinae, L hip flexor, L glute med/min, L QL, L hamstring JOINT RESTRICTIONS: T3-7, 10-S1, L R6, L R9, and L SIJ     Return in about 1 week (around 10/8/2024) for Next scheduled follow up.

## 2024-10-03 ENCOUNTER — PROCEDURE VISIT (OUTPATIENT)
Age: 49
End: 2024-10-03
Payer: COMMERCIAL

## 2024-10-03 VITALS
WEIGHT: 158 LBS | OXYGEN SATURATION: 98 % | HEART RATE: 103 BPM | BODY MASS INDEX: 25.39 KG/M2 | SYSTOLIC BLOOD PRESSURE: 122 MMHG | HEIGHT: 66 IN | DIASTOLIC BLOOD PRESSURE: 72 MMHG

## 2024-10-03 DIAGNOSIS — M79.18 MYOFASCIAL PAIN SYNDROME: ICD-10-CM

## 2024-10-03 DIAGNOSIS — M99.04 SEGMENTAL DYSFUNCTION OF SACRAL REGION: Primary | ICD-10-CM

## 2024-10-03 DIAGNOSIS — M99.02 SEGMENTAL DYSFUNCTION OF THORACIC REGION: ICD-10-CM

## 2024-10-03 DIAGNOSIS — M51.360 DEGENERATION OF INTERVERTEBRAL DISC OF LUMBAR REGION WITH DISCOGENIC BACK PAIN: ICD-10-CM

## 2024-10-03 DIAGNOSIS — M24.552 HIP FLEXOR TENDON TIGHTNESS, LEFT: ICD-10-CM

## 2024-10-03 DIAGNOSIS — M99.08 SOMATIC DYSFUNCTION OF COSTOVERTEBRAL JOINT STRUCTURE: ICD-10-CM

## 2024-10-03 PROCEDURE — 98941 CHIROPRACT MANJ 3-4 REGIONS: CPT | Performed by: CHIROPRACTOR

## 2024-10-03 PROCEDURE — 97110 THERAPEUTIC EXERCISES: CPT | Performed by: CHIROPRACTOR

## 2024-10-03 NOTE — PROGRESS NOTES
Initial date of service: 7/10/24    Diagnoses and all orders for this visit:    Segmental dysfunction of sacral region    Degeneration of intervertebral disc of lumbar region with discogenic back pain    Hip flexor tendon tightness, left    Somatic dysfunction of costovertebral joint structure    Myofascial pain syndrome    Segmental dysfunction of thoracic region    Pt responded well to tx with reduced pain and increased ROM    TREATMENT: [id number], [id number]  Ther-ex: IASTM; discussed post procedure soreness and/or ecchymosis for up to 36 hrs, applied to affected mm hypertonicities; cobra stretch, standing extensions, hip flexor pin-and-stretch, transitional mvmt education, abdominal bracing; greater than 15 min spent performing above mentioned ther-ex to improve ROM/flexibility. Thoracic mobilization/manipulation: prone P-A mob, supine A-P manip; Lumbar mobilization/manipulation: diversified side laying graded HVLA, extension-traction; SIJ Manipulation/Mobilization: L SIJ HVLA - long axis distraction, mack drop table maneuver to affected SIJ    HPI:  Krys Alex is a 49 y.o. female  Chief Complaint   Patient presents with    Back Pain     Lower back pain-3     Pt presents for tx fpor L sided neck to lower back pain after catching falling patient while bent and twisted to her right 4/26/24 while working for [employer]. Pt has undergone PT with modest benefit. Lumbar and Thoracic Spine MRIs 2024 demonstrate L4-L5: tiny right posterolateral annular fissure, small right foraminal disc protrusion. Mild facet arthropathy, trace facet joint effusions. Mild canal stenosis. Mild-to-moderate right and mild left foraminal narrowing L5-S1: Mild diffuse disc bulge, tiny right posterolateral annular fissure. Pt was seen by Pain Mgmt who referred here for consutl/tx  10/3: Pt reports feeling and moving better since last tx    Back Pain  This is a new problem. The current episode started more than 1 month ago. The  problem occurs constantly. The problem has been waxing and waning since onset. The pain is present in the gluteal, lumbar spine, sacro-iliac and thoracic spine. The quality of the pain is described as aching. The pain does not radiate. The pain is Worse during the day. The symptoms are aggravated by twisting and bending (transitional mvmts, prolonged walking/standing, uphill/downhill, cobra stretch; palliative includes rest, massage, traction, laying supine). Pertinent negatives include no bladder incontinence or bowel incontinence.   Shoulder Pain     Neck Pain       Past Medical History:   Diagnosis Date    COVID-19 05/2022    Finger fracture, left     Intrinsic muscle tightness 01/16/2019    Pure hypercholesterolemia 10/14/2021    Trigger middle finger of left hand 01/16/2019    UTI symptoms 10/14/2021    Vitamin D deficiency       Past Surgical History:   Procedure Laterality Date    LAPAROSCOPIC OVARIAN CYSTECTOMY Right 2001    Lysis of adhesions, Dr. Miranda    OVARIAN CYST REMOVAL Right 1993    open, Dr. Arce    TONSILECTOMY AND ADNOIDECTOMY      TONSILLECTOMY      WISDOM TOOTH EXTRACTION       The following portions of the patient's history were reviewed and updated as appropriate: allergies, past family history, past medical history, past social history, past surgical history, and problem list.  Review of Systems   Gastrointestinal:  Negative for bowel incontinence.   Genitourinary:  Negative for bladder incontinence.   Musculoskeletal:  Positive for back pain and neck pain.     Physical Exam  Musculoskeletal:      Thoracic back: Spasms and tenderness present. Decreased range of motion.      Lumbar back: Spasms and tenderness present. Decreased range of motion. Negative right straight leg raise test and negative left straight leg raise test.        Back:       Comments: Pnful and limited in Rrot 10, Blf 15(contralaterally), Ext 15, Ext/Brot   Skin:     General: Skin is warm and dry.   Neurological:      Mental  Status: She is alert and oriented to person, place, and time.      Gait: Gait is intact.   Psychiatric:         Mood and Affect: Mood normal.         Behavior: Behavior normal.       SOFT TISSUE ASSESSMENT Hypertonicity and tenderness palpated L T 3-7, 10-S1 erector spinae, L hip flexor, L glute med/min, L QL, L hamstring JOINT RESTRICTIONS: T3-7, 10-S1, L R6, L R9, and L SIJ     Return in about 2 weeks (around 10/17/2024) for Next scheduled follow up.

## 2024-10-08 ENCOUNTER — PROCEDURE VISIT (OUTPATIENT)
Age: 49
End: 2024-10-08
Payer: COMMERCIAL

## 2024-10-08 VITALS
HEIGHT: 66 IN | HEART RATE: 91 BPM | SYSTOLIC BLOOD PRESSURE: 122 MMHG | DIASTOLIC BLOOD PRESSURE: 72 MMHG | WEIGHT: 158 LBS | BODY MASS INDEX: 25.39 KG/M2 | OXYGEN SATURATION: 99 %

## 2024-10-08 DIAGNOSIS — M99.02 SEGMENTAL DYSFUNCTION OF THORACIC REGION: ICD-10-CM

## 2024-10-08 DIAGNOSIS — M24.552 HIP FLEXOR TENDON TIGHTNESS, LEFT: ICD-10-CM

## 2024-10-08 DIAGNOSIS — M79.18 MYOFASCIAL PAIN SYNDROME: ICD-10-CM

## 2024-10-08 DIAGNOSIS — M99.04 SEGMENTAL DYSFUNCTION OF SACRAL REGION: Primary | ICD-10-CM

## 2024-10-08 DIAGNOSIS — M99.08 SOMATIC DYSFUNCTION OF COSTOVERTEBRAL JOINT STRUCTURE: ICD-10-CM

## 2024-10-08 DIAGNOSIS — M51.360 DEGENERATION OF INTERVERTEBRAL DISC OF LUMBAR REGION WITH DISCOGENIC BACK PAIN: ICD-10-CM

## 2024-10-08 PROCEDURE — 97110 THERAPEUTIC EXERCISES: CPT | Performed by: CHIROPRACTOR

## 2024-10-08 PROCEDURE — 98941 CHIROPRACT MANJ 3-4 REGIONS: CPT | Performed by: CHIROPRACTOR

## 2024-10-08 NOTE — PROGRESS NOTES
Initial date of service: 7/10/24    Diagnoses and all orders for this visit:    Segmental dysfunction of sacral region    Degeneration of intervertebral disc of lumbar region with discogenic back pain    Hip flexor tendon tightness, left    Somatic dysfunction of costovertebral joint structure    Myofascial pain syndrome    Segmental dysfunction of thoracic region    Pt responded well to tx with reduced pain and increased ROM    TREATMENT: 80448, [id number]  Ther-ex: IASTM; discussed post procedure soreness and/or ecchymosis for up to 36 hrs, applied to affected mm hypertonicities; cobra stretch, standing extensions, hip flexor pin-and-stretch, transitional mvmt education, abdominal bracing; greater than 15 min spent performing above mentioned ther-ex to improve ROM/flexibility. Thoracic mobilization/manipulation: prone P-A mob, supine A-P manip; Lumbar mobilization/manipulation: diversified side laying graded HVLA, extension-traction; SIJ Manipulation/Mobilization: L SIJ HVLA - long axis distraction, mack drop table maneuver to affected SIJ    HPI:  Krys Alex is a 49 y.o. female  Chief Complaint   Patient presents with    Back Pain     Middle back pain-4  Lower back pain-4    Neck Pain     Neck pain-4     Pt presents for tx fpor L sided neck to lower back pain after catching falling patient while bent and twisted to her right 4/26/24 while working for [employer]. Pt has undergone PT with modest benefit. Lumbar and Thoracic Spine MRIs 2024 demonstrate L4-L5: tiny right posterolateral annular fissure, small right foraminal disc protrusion. Mild facet arthropathy, trace facet joint effusions. Mild canal stenosis. Mild-to-moderate right and mild left foraminal narrowing L5-S1: Mild diffuse disc bulge, tiny right posterolateral annular fissure. Pt was seen by Pain Mgmt who referred here for consutl/tx  10/8: Pt reports feeling and moving better since last tx although stiff/sore from dealing with  violent patient at work over the weekend    Back Pain  This is a new problem. The current episode started more than 1 month ago. The problem occurs constantly. The problem has been waxing and waning since onset. The pain is present in the gluteal, lumbar spine, sacro-iliac and thoracic spine. The quality of the pain is described as aching. The pain does not radiate. The pain is Worse during the day. The symptoms are aggravated by twisting and bending (transitional mvmts, prolonged walking/standing, uphill/downhill, cobra stretch; palliative includes rest, massage, traction, laying supine). Pertinent negatives include no bladder incontinence or bowel incontinence.   Shoulder Pain     Neck Pain       Past Medical History:   Diagnosis Date    COVID-19 05/2022    Finger fracture, left     Intrinsic muscle tightness 01/16/2019    Pure hypercholesterolemia 10/14/2021    Trigger middle finger of left hand 01/16/2019    UTI symptoms 10/14/2021    Vitamin D deficiency       Past Surgical History:   Procedure Laterality Date    LAPAROSCOPIC OVARIAN CYSTECTOMY Right 2001    Lysis of adhesions, Dr. Miranda    OVARIAN CYST REMOVAL Right 1993    open, Dr. Arce    TONSILECTOMY AND ADNOIDECTOMY      TONSILLECTOMY      WISDOM TOOTH EXTRACTION       The following portions of the patient's history were reviewed and updated as appropriate: allergies, past family history, past medical history, past social history, past surgical history, and problem list.  Review of Systems   Gastrointestinal:  Negative for bowel incontinence.   Genitourinary:  Negative for bladder incontinence.   Musculoskeletal:  Positive for back pain and neck pain.     Physical Exam  Musculoskeletal:      Thoracic back: Spasms and tenderness present. Decreased range of motion.      Lumbar back: Spasms and tenderness present. Decreased range of motion. Negative right straight leg raise test and negative left straight leg raise test.        Back:       Comments: Pnful and  limited in Rrot 10, Blf 15(contralaterally), Ext 15, Ext/Brot   Skin:     General: Skin is warm and dry.   Neurological:      Mental Status: She is alert and oriented to person, place, and time.      Gait: Gait is intact.   Psychiatric:         Mood and Affect: Mood normal.         Behavior: Behavior normal.       SOFT TISSUE ASSESSMENT Hypertonicity and tenderness palpated L T 3-7, 10-S1 erector spinae, L hip flexor, L glute med/min, L QL, L hamstring JOINT RESTRICTIONS: T3-7, 10-S1, L R6, L R9, and L SIJ     Return in about 2 weeks (around 10/22/2024) for Next scheduled follow up.

## 2024-10-10 ENCOUNTER — PROCEDURE VISIT (OUTPATIENT)
Age: 49
End: 2024-10-10
Payer: COMMERCIAL

## 2024-10-10 VITALS
BODY MASS INDEX: 25.39 KG/M2 | HEART RATE: 93 BPM | WEIGHT: 158 LBS | DIASTOLIC BLOOD PRESSURE: 76 MMHG | SYSTOLIC BLOOD PRESSURE: 122 MMHG | HEIGHT: 66 IN | OXYGEN SATURATION: 98 %

## 2024-10-10 DIAGNOSIS — M99.08 SOMATIC DYSFUNCTION OF COSTOVERTEBRAL JOINT STRUCTURE: ICD-10-CM

## 2024-10-10 DIAGNOSIS — M79.18 MYOFASCIAL PAIN SYNDROME: ICD-10-CM

## 2024-10-10 DIAGNOSIS — M99.02 SEGMENTAL DYSFUNCTION OF THORACIC REGION: ICD-10-CM

## 2024-10-10 DIAGNOSIS — M24.552 HIP FLEXOR TENDON TIGHTNESS, LEFT: ICD-10-CM

## 2024-10-10 DIAGNOSIS — M99.04 SEGMENTAL DYSFUNCTION OF SACRAL REGION: Primary | ICD-10-CM

## 2024-10-10 DIAGNOSIS — M51.360 DEGENERATION OF INTERVERTEBRAL DISC OF LUMBAR REGION WITH DISCOGENIC BACK PAIN: ICD-10-CM

## 2024-10-10 PROCEDURE — 97110 THERAPEUTIC EXERCISES: CPT | Performed by: CHIROPRACTOR

## 2024-10-10 PROCEDURE — 98941 CHIROPRACT MANJ 3-4 REGIONS: CPT | Performed by: CHIROPRACTOR

## 2024-10-10 NOTE — PROGRESS NOTES
Initial date of service: 7/10/24    Diagnoses and all orders for this visit:    Segmental dysfunction of sacral region    Degeneration of intervertebral disc of lumbar region with discogenic back pain    Hip flexor tendon tightness, left    Somatic dysfunction of costovertebral joint structure    Myofascial pain syndrome    Segmental dysfunction of thoracic region    Pt responded well to tx with reduced pain and increased ROM    TREATMENT: 98248, [id number]  Ther-ex: IASTM; discussed post procedure soreness and/or ecchymosis for up to 36 hrs, applied to affected mm hypertonicities; cobra stretch, standing extensions, hip flexor pin-and-stretch, transitional mvmt education, abdominal bracing; greater than 15 min spent performing above mentioned ther-ex to improve ROM/flexibility. Thoracic mobilization/manipulation: prone P-A mob, supine A-P manip; Lumbar mobilization/manipulation: diversified side laying graded HVLA, extension-traction; SIJ Manipulation/Mobilization: L SIJ HVLA - long axis distraction, mack drop table maneuver to affected SIJ    HPI:  Krys Alex is a 49 y.o. female  Chief Complaint   Patient presents with    Back Pain     Middle back pain-2  Lower back pain-2    Neck Pain     Neck pain-2     Pt presents for tx fpor L sided neck to lower back pain after catching falling patient while bent and twisted to her right 4/26/24 while working for [employer]. Pt has undergone PT with modest benefit. Lumbar and Thoracic Spine MRIs 2024 demonstrate L4-L5: tiny right posterolateral annular fissure, small right foraminal disc protrusion. Mild facet arthropathy, trace facet joint effusions. Mild canal stenosis. Mild-to-moderate right and mild left foraminal narrowing L5-S1: Mild diffuse disc bulge, tiny right posterolateral annular fissure. Pt was seen by Pain Mgmt who referred here for consutl/tx  10/10: Pt reports feeling and moving better since last tx although still stiff/sore from dealing  with violent patient at work over the weekend    Back Pain  This is a new problem. The current episode started more than 1 month ago. The problem occurs constantly. The problem has been waxing and waning since onset. The pain is present in the gluteal, lumbar spine, sacro-iliac and thoracic spine. The quality of the pain is described as aching. The pain does not radiate. The pain is Worse during the day. The symptoms are aggravated by twisting and bending (transitional mvmts, prolonged walking/standing, uphill/downhill, cobra stretch; palliative includes rest, massage, traction, laying supine). Pertinent negatives include no bladder incontinence or bowel incontinence.   Shoulder Pain     Neck Pain       Past Medical History:   Diagnosis Date    COVID-19 05/2022    Finger fracture, left     Intrinsic muscle tightness 01/16/2019    Pure hypercholesterolemia 10/14/2021    Trigger middle finger of left hand 01/16/2019    UTI symptoms 10/14/2021    Vitamin D deficiency       Past Surgical History:   Procedure Laterality Date    LAPAROSCOPIC OVARIAN CYSTECTOMY Right 2001    Lysis of adhesions, Dr. Miranda    OVARIAN CYST REMOVAL Right 1993    open, Dr. Arce    TONSILECTOMY AND ADNOIDECTOMY      TONSILLECTOMY      WISDOM TOOTH EXTRACTION       The following portions of the patient's history were reviewed and updated as appropriate: allergies, past family history, past medical history, past social history, past surgical history, and problem list.  Review of Systems   Gastrointestinal:  Negative for bowel incontinence.   Genitourinary:  Negative for bladder incontinence.   Musculoskeletal:  Positive for back pain and neck pain.     Physical Exam  Musculoskeletal:      Thoracic back: Spasms and tenderness present. Decreased range of motion.      Lumbar back: Spasms and tenderness present. Decreased range of motion. Negative right straight leg raise test and negative left straight leg raise test.        Back:       Comments: Pnful  and limited in Rrot 10, Blf 15(contralaterally), Ext 15, Ext/Brot   Skin:     General: Skin is warm and dry.   Neurological:      Mental Status: She is alert and oriented to person, place, and time.      Gait: Gait is intact.   Psychiatric:         Mood and Affect: Mood normal.         Behavior: Behavior normal.       SOFT TISSUE ASSESSMENT Hypertonicity and tenderness palpated L T 3-7, 10-S1 erector spinae, L hip flexor, L glute med/min, L QL, L hamstring JOINT RESTRICTIONS: T3-7, 10-S1, L R6, L R9, and L SIJ     Return in about 2 weeks (around 10/24/2024) for Next scheduled follow up.

## 2024-10-17 ENCOUNTER — PROCEDURE VISIT (OUTPATIENT)
Age: 49
End: 2024-10-17
Payer: COMMERCIAL

## 2024-10-17 VITALS
HEART RATE: 96 BPM | BODY MASS INDEX: 25.39 KG/M2 | DIASTOLIC BLOOD PRESSURE: 78 MMHG | SYSTOLIC BLOOD PRESSURE: 130 MMHG | WEIGHT: 158 LBS | HEIGHT: 66 IN | OXYGEN SATURATION: 97 %

## 2024-10-17 DIAGNOSIS — M99.08 SOMATIC DYSFUNCTION OF COSTOVERTEBRAL JOINT STRUCTURE: ICD-10-CM

## 2024-10-17 DIAGNOSIS — M99.04 SEGMENTAL DYSFUNCTION OF SACRAL REGION: Primary | ICD-10-CM

## 2024-10-17 DIAGNOSIS — M99.02 SEGMENTAL DYSFUNCTION OF THORACIC REGION: ICD-10-CM

## 2024-10-17 DIAGNOSIS — M79.18 MYOFASCIAL PAIN SYNDROME: ICD-10-CM

## 2024-10-17 DIAGNOSIS — M24.552 HIP FLEXOR TENDON TIGHTNESS, LEFT: ICD-10-CM

## 2024-10-17 DIAGNOSIS — M51.360 DEGENERATION OF INTERVERTEBRAL DISC OF LUMBAR REGION WITH DISCOGENIC BACK PAIN: ICD-10-CM

## 2024-10-17 PROCEDURE — 98941 CHIROPRACT MANJ 3-4 REGIONS: CPT | Performed by: CHIROPRACTOR

## 2024-10-17 PROCEDURE — 97110 THERAPEUTIC EXERCISES: CPT | Performed by: CHIROPRACTOR

## 2024-10-17 NOTE — PROGRESS NOTES
Initial date of service: 7/10/24    Diagnoses and all orders for this visit:    Segmental dysfunction of sacral region    Degeneration of intervertebral disc of lumbar region with discogenic back pain    Hip flexor tendon tightness, left    Somatic dysfunction of costovertebral joint structure    Myofascial pain syndrome    Segmental dysfunction of thoracic region    Pt responded well to tx with reduced pain and increased ROM    TREATMENT: 41608, 98830  Ther-ex: IASTM; discussed post procedure soreness and/or ecchymosis for up to 36 hrs, applied to affected mm hypertonicities; cobra stretch, standing extensions, hip flexor pin-and-stretch, transitional mvmt education, abdominal bracing; greater than 15 min spent performing above mentioned ther-ex to improve ROM/flexibility. Thoracic mobilization/manipulation: prone P-A mob, supine A-P manip; Lumbar mobilization/manipulation: diversified side laying graded HVLA, extension-traction; SIJ Manipulation/Mobilization: L SIJ HVLA - long axis distraction, mack drop table maneuver to affected SIJ    HPI:  Krys Alex is a 49 y.o. female  Chief Complaint   Patient presents with    Back Pain     Middle back pain-3  Lower back pain-3      Neck Pain     Neck pain-3     Pt presents for tx fpor L sided neck to lower back pain after catching falling patient while bent and twisted to her right 4/26/24 while working for [employer]. Pt has undergone PT with modest benefit. Lumbar and Thoracic Spine MRIs 2024 demonstrate L4-L5: tiny right posterolateral annular fissure, small right foraminal disc protrusion. Mild facet arthropathy, trace facet joint effusions. Mild canal stenosis. Mild-to-moderate right and mild left foraminal narrowing L5-S1: Mild diffuse disc bulge, tiny right posterolateral annular fissure. Pt was seen by Pain Mgmt who referred here for consutl/tx  10/17: Pt reports feeling and moving better since last tx    Back Pain  This is a new problem. The  current episode started more than 1 month ago. The problem occurs constantly. The problem has been waxing and waning since onset. The pain is present in the gluteal, lumbar spine, sacro-iliac and thoracic spine. The quality of the pain is described as aching. The pain does not radiate. The pain is Worse during the day. The symptoms are aggravated by twisting and bending (transitional mvmts, prolonged walking/standing, uphill/downhill, cobra stretch; palliative includes rest, massage, traction, laying supine). Pertinent negatives include no bladder incontinence or bowel incontinence.   Shoulder Pain     Neck Pain       Past Medical History:   Diagnosis Date    COVID-19 05/2022    Finger fracture, left     Intrinsic muscle tightness 01/16/2019    Pure hypercholesterolemia 10/14/2021    Trigger middle finger of left hand 01/16/2019    UTI symptoms 10/14/2021    Vitamin D deficiency       Past Surgical History:   Procedure Laterality Date    LAPAROSCOPIC OVARIAN CYSTECTOMY Right 2001    Lysis of adhesions, Dr. Miranda    OVARIAN CYST REMOVAL Right 1993    open, Dr. Arce    TONSILECTOMY AND ADNOIDECTOMY      TONSILLECTOMY      WISDOM TOOTH EXTRACTION       The following portions of the patient's history were reviewed and updated as appropriate: allergies, past family history, past medical history, past social history, past surgical history, and problem list.  Review of Systems   Gastrointestinal:  Negative for bowel incontinence.   Genitourinary:  Negative for bladder incontinence.   Musculoskeletal:  Positive for back pain and neck pain.     Physical Exam  Musculoskeletal:      Thoracic back: Spasms and tenderness present. Decreased range of motion.      Lumbar back: Spasms and tenderness present. Decreased range of motion. Negative right straight leg raise test and negative left straight leg raise test.        Back:       Comments: Pnful and limited in Rrot 10, Blf 15(contralaterally), Ext 15, Ext/Brot   Skin:     General:  Skin is warm and dry.   Neurological:      Mental Status: She is alert and oriented to person, place, and time.      Gait: Gait is intact.   Psychiatric:         Mood and Affect: Mood normal.         Behavior: Behavior normal.       SOFT TISSUE ASSESSMENT Hypertonicity and tenderness palpated L T 3-7, 10-S1 erector spinae, L hip flexor, L glute med/min, L QL, L hamstring JOINT RESTRICTIONS: T3-7, 10-S1, L R6, L R9, and L SIJ     Return in about 2 weeks (around 10/31/2024) for Next scheduled follow up.

## 2024-10-18 NOTE — PATIENT INSTRUCTIONS
"Patient Education     Routine physical for adults   The Basics   Written by the doctors and editors at [facility]   What is a physical? -- A physical is a routine visit, or \"check-up,\" with your doctor. You might also hear it called a \"wellness visit\" or \"preventive visit.\"  During each visit, the doctor will:   Ask about your physical and mental health   Ask about your habits, behaviors, and lifestyle   Do an exam   Give you vaccines if needed   Talk to you about any medicines you take   Give advice about your health   Answer your questions  Getting regular check-ups is an important part of taking care of your health. It can help your doctor find and treat any problems you have. But it's also important for preventing health problems.  A routine physical is different from a \"sick visit.\" A sick visit is when you see a doctor because of a health concern or problem. Since physicals are scheduled ahead of time, you can think about what you want to ask the doctor.  How often should I get a physical? -- It depends on your age and health. In general, for people age 21 years and older:   If you are younger than 50 years, you might be able to get a physical every 3 years.   If you are 50 years or older, your doctor might recommend a physical every year.  If you have an ongoing health condition, like diabetes or high blood pressure, your doctor will probably want to see you more often.  What happens during a physical? -- In general, each visit will include:   Physical exam - The doctor or nurse will check your height, weight, heart rate, and blood pressure. They will also look at your eyes and ears. They will ask about how you are feeling and whether you have any symptoms that bother you.   Medicines - It's a good idea to bring a list of all the medicines you take to each doctor visit. Your doctor will talk to you about your medicines and answer any questions. Tell them if you are having any side effects that bother you. You " "should also tell them if you are having trouble paying for any of your medicines.   Habits and behaviors - This includes:   Your diet   Your exercise habits   Whether you smoke, drink alcohol, or use drugs   Whether you are sexually active   Whether you feel safe at home  Your doctor will talk to you about things you can do to improve your health and lower your risk of health problems. They will also offer help and support. For example, if you want to quit smoking, they can give you advice and might prescribe medicines. If you want to improve your diet or get more physical activity, they can help you with this, too.   Lab tests, if needed - The tests you get will depend on your age and situation. For example, your doctor might want to check your:   Cholesterol   Blood sugar   Iron level   Vaccines - The recommended vaccines will depend on your age, health, and what vaccines you already had. Vaccines are very important because they can prevent certain serious or deadly infections.   Discussion of screening - \"Screening\" means checking for diseases or other health problems before they cause symptoms. Your doctor can recommend screening based on your age, risk, and preferences. This might include tests to check for:   Cancer, such as breast, prostate, cervical, ovarian, colorectal, prostate, lung, or skin cancer   Sexually transmitted infections, such as chlamydia and gonorrhea   Mental health conditions like depression and anxiety  Your doctor will talk to you about the different types of screening tests. They can help you decide which screenings to have. They can also explain what the results might mean.   Answering questions - The physical is a good time to ask the doctor or nurse questions about your health. If needed, they can refer you to other doctors or specialists, too.  Adults older than 65 years often need other care, too. As you get older, your doctor will talk to you about:   How to prevent falling at " home   Hearing or vision tests   Memory testing   How to take your medicines safely   Making sure that you have the help and support you need at home  All topics are updated as new evidence becomes available and our peer review process is complete.  This topic retrieved from [employer] on: May 02, 2024.  Topic 882217 Version 1.0  Release: 32.4.3 - C32.122  © 2024 UpToDate, Inc. and/or its affiliates. All rights reserved.  Consumer Information Use and Disclaimer   Disclaimer: This generalized information is a limited summary of diagnosis, treatment, and/or medication information. It is not meant to be comprehensive and should be used as a tool to help the user understand and/or assess potential diagnostic and treatment options. It does NOT include all information about conditions, treatments, medications, side effects, or risks that may apply to a specific patient. It is not intended to be medical advice or a substitute for the medical advice, diagnosis, or treatment of a health care provider based on the health care provider's examination and assessment of a patient's specific and unique circumstances. Patients must speak with a health care provider for complete information about their health, medical questions, and treatment options, including any risks or benefits regarding use of medications. This information does not endorse any treatments or medications as safe, effective, or approved for treating a specific patient. UpToDate, Inc. and its affiliates disclaim any warranty or liability relating to this information or the use thereof.The use of this information is governed by the Terms of Use, available at https://www.[employer] Holdingsuwer.com/en/know/clinical-effectiveness-terms. 2024© UpToDate, Inc. and its affiliates and/or licensors. All rights reserved.  Copyright   © 2024 UpToDate, Inc. and/or its affiliates. All rights reserved.

## 2024-10-18 NOTE — PROGRESS NOTES
Adult Annual Physical  Name: Krys Alex      : 1975      MRN: [id number]  Encounter Provider: Rex Hilario DO  Encounter Date: 10/21/2024   Encounter department: [facility] INTERNAL MEDICINE    Assessment & Plan  Perimenopausal symptom  Patient brought up concerns about sleep.  She believes it is mainly attributable to perimenopausal symptoms.  Particularly, she believes hot flashes make it difficult for her to fall asleep and stay asleep overnight.    She also believes that it could be attributable to some of her chronic back pain along with the high stress demands of her job.    Discussed possible sleep medications that patient can try.  She has not tried anything over-the-counter at this time.  Discussed starting with melatonin to see if that might aid sleep and patient is agreeable.    Also discussed other medications such as trazodone, sleep aids.  I advised patient to consider use of venlafaxine as this could help control anxiety/stress from work, perimenopausal symptoms, and nerve pain from chronic back pain.  However, patient is not interested in starting this prescription at this time.    Advised patient that if melatonin does not seem to help she may call back to further discuss these other medications.       Migraine headache  Patient has been having more back pain and sees chiropractor.  As such, she has used her prescription for Fioricet.  Last filled in the middle of September.  Will provide patient refill at today's visit.  PDMP reviewed today.  Orders:    butalbital-acetaminophen-caffeine (FIORICET,ESGIC) -40 mg per tablet; Take 1 tablet by mouth every 6 (six) hours as needed for headaches    Muscle spasm  Patient with chronic low back pain.  States that she has had some relief from muscle relaxant in the past.  Will refill patient's prescription for Flexeril at this time.  Advised patient to take this at bedtime as it could also aid patient with sleep.  Orders:     cyclobenzaprine (FLEXERIL) 10 mg tablet; Take 1 tablet (10 mg total) by mouth daily at bedtime    Annual physical exam  Annual physical examination was performed today.  Discussed patient's diet, exercise, general health.  Also reviewed patient's chronic conditions, medications, immunizations.    Patient will be getting flu vaccine through work.  Brought up need for tetanus vaccination.  Patient does not want tetanus at this time and will consider it after getting flu vaccine.    Patient refusing colorectal screening by both colonoscopy and stool based testing.  Also discussed routine blood work.  Patient will continue to get A1c and lipid panel through insurance incentive program and to monitor hyper cholesterolemia.       Immunizations and preventive care screenings were discussed with patient today. Appropriate education was printed on patient's after visit summary.    Counseling:  Alcohol/drug use: discussed moderation in alcohol intake, the recommendations for healthy alcohol use, and avoidance of illicit drug use.  Dental Health: discussed importance of regular tooth brushing, flossing, and dental visits.  Exercise: the importance of regular exercise/physical activity was discussed. Recommend exercise 3-5 times per week for at least 30 minutes.          History of Present Illness     Adult Annual Physical:  Patient presents for annual physical. Ms. Krys Alex is a 49-year-old female with past medical history of migraines with auras, sees allergic rhinitis, vertigo.  Patient presents to clinic today for annual physical examination.  Annual physical examination was performed today.  Discussed patient's diet, exercise, general health.  Also reviewed patient's chronic conditions, medications, immunizations.  Patient will be getting flu vaccine through work.  Brought up need for tetanus vaccination.  Patient does not want tetanus at this time and will consider it after getting flu vaccine.  Also discussed  routine blood work.  Patient will continue to get A1c and lipid panel through insurance incentive program and to monitor hyper cholesterolemia.      Patient brought up concerns about sleep.  She believes it is mainly attributable to perimenopausal symptoms.  Particularly, she believes hot flashes make it difficult for her to fall asleep and stay asleep overnight.  She also believes that it could be attributable to some of her chronic back pain along with the high stress demands of her job.  Discussed possible sleep medications that patient can try.  She has not tried anything over-the-counter at this time.  Discussed starting with melatonin to see if that might aid sleep and patient is agreeable.  Also discussed other medications such as trazodone, sleep aids.  I advised patient to continue use of venlafaxine as this could help control anxiety/stress from work, perimenopausal symptoms, and nerve pain from chronic back pain.  However, patient is not interested in starting this prescription at this time.  Advised patient that if melatonin does not seem to help she may call back to further discuss these other medications..     Diet and Physical Activity:  - Diet/Nutrition: well balanced diet.  - Exercise: no formal exercise.    Depression Screening:  - PHQ-2 Score: 0    General Health:  - Sleep: sleeps poorly.  - Hearing: normal hearing bilateral ears.  - Vision: wears glasses and most recent eye exam > 1 year ago.  - Dental: regular dental visits.    /GYN Health:  - Follows with GYN: yes.   - Menopause: perimenopausal.   - History of STDs: no    Advanced Care Planning:  - Has an advanced directive?: no    - Has a durable medical POA?: no    - ACP document given to patient?: no      Review of Systems   Constitutional:  Negative for chills and fever.   HENT:  Negative for congestion, rhinorrhea and sore throat.    Respiratory:  Negative for cough, shortness of breath and wheezing.    Cardiovascular:  Negative for  "chest pain and leg swelling.   Gastrointestinal:  Negative for abdominal distention, abdominal pain, constipation, diarrhea, nausea and vomiting.   Genitourinary:  Negative for difficulty urinating and dysuria.   Musculoskeletal:  Negative for arthralgias and back pain.   Skin:  Negative for rash and wound.   Neurological:  Negative for dizziness, syncope, weakness, light-headedness and headaches.   Psychiatric/Behavioral:  Negative for agitation, behavioral problems and confusion.          Objective     /78   Pulse 99   Resp 15   Ht 5' 6\" (1.676 m)   Wt 69.9 kg (154 lb)   LMP 03/01/2023 (Approximate)   SpO2 100%   BMI 24.86 kg/m²     Physical Exam  Constitutional:       Appearance: Normal appearance.   HENT:      Right Ear: Tympanic membrane, ear canal and external ear normal.      Left Ear: Tympanic membrane, ear canal and external ear normal.      Nose: Nose normal. No congestion or rhinorrhea.      Mouth/Throat:      Mouth: Mucous membranes are dry.      Pharynx: Oropharynx is clear.   Eyes:      Extraocular Movements: Extraocular movements intact.      Conjunctiva/sclera: Conjunctivae normal.      Pupils: Pupils are equal, round, and reactive to light.   Cardiovascular:      Rate and Rhythm: Normal rate and regular rhythm.      Pulses: Normal pulses.      Heart sounds: Normal heart sounds. No murmur heard.  Pulmonary:      Effort: Pulmonary effort is normal. No respiratory distress.      Breath sounds: Normal breath sounds. No wheezing, rhonchi or rales.   Abdominal:      General: Abdomen is flat. Bowel sounds are normal. There is no distension.      Palpations: Abdomen is soft.      Tenderness: There is no abdominal tenderness.   Musculoskeletal:      Right lower leg: No edema.      Left lower leg: No edema.   Skin:     General: Skin is warm and dry.   Neurological:      Mental Status: She is alert and oriented to person, place, and time.   Psychiatric:         Mood and Affect: Mood normal.        "  Behavior: Behavior normal.         Thought Content: Thought content normal.

## 2024-10-21 ENCOUNTER — OFFICE VISIT (OUTPATIENT)
Age: 49
End: 2024-10-21
Payer: COMMERCIAL

## 2024-10-21 ENCOUNTER — PROCEDURE VISIT (OUTPATIENT)
Age: 49
End: 2024-10-21
Payer: COMMERCIAL

## 2024-10-21 VITALS
SYSTOLIC BLOOD PRESSURE: 128 MMHG | WEIGHT: 154 LBS | BODY MASS INDEX: 24.75 KG/M2 | HEIGHT: 66 IN | DIASTOLIC BLOOD PRESSURE: 82 MMHG | HEART RATE: 81 BPM

## 2024-10-21 VITALS
SYSTOLIC BLOOD PRESSURE: 126 MMHG | WEIGHT: 154 LBS | HEIGHT: 66 IN | HEART RATE: 99 BPM | DIASTOLIC BLOOD PRESSURE: 78 MMHG | OXYGEN SATURATION: 100 % | RESPIRATION RATE: 15 BRPM | BODY MASS INDEX: 24.75 KG/M2

## 2024-10-21 DIAGNOSIS — N95.1 PERIMENOPAUSAL SYMPTOM: Primary | ICD-10-CM

## 2024-10-21 DIAGNOSIS — M79.18 MYOFASCIAL PAIN SYNDROME: ICD-10-CM

## 2024-10-21 DIAGNOSIS — M51.360 DEGENERATION OF INTERVERTEBRAL DISC OF LUMBAR REGION WITH DISCOGENIC BACK PAIN: ICD-10-CM

## 2024-10-21 DIAGNOSIS — M62.838 MUSCLE SPASM: ICD-10-CM

## 2024-10-21 DIAGNOSIS — M99.08 SOMATIC DYSFUNCTION OF COSTOVERTEBRAL JOINT STRUCTURE: ICD-10-CM

## 2024-10-21 DIAGNOSIS — M46.1 SACROILIITIS (HCC): ICD-10-CM

## 2024-10-21 DIAGNOSIS — M99.04 SEGMENTAL DYSFUNCTION OF SACRAL REGION: Primary | ICD-10-CM

## 2024-10-21 DIAGNOSIS — Z00.00 ANNUAL PHYSICAL EXAM: ICD-10-CM

## 2024-10-21 DIAGNOSIS — M24.552 HIP FLEXOR TENDON TIGHTNESS, LEFT: ICD-10-CM

## 2024-10-21 DIAGNOSIS — G43.909 MIGRAINE HEADACHE: ICD-10-CM

## 2024-10-21 DIAGNOSIS — M99.02 SEGMENTAL DYSFUNCTION OF THORACIC REGION: ICD-10-CM

## 2024-10-21 PROCEDURE — 97110 THERAPEUTIC EXERCISES: CPT | Performed by: CHIROPRACTOR

## 2024-10-21 PROCEDURE — 99213 OFFICE O/P EST LOW 20 MIN: CPT | Performed by: STUDENT IN AN ORGANIZED HEALTH CARE EDUCATION/TRAINING PROGRAM

## 2024-10-21 PROCEDURE — 98941 CHIROPRACT MANJ 3-4 REGIONS: CPT | Performed by: CHIROPRACTOR

## 2024-10-21 PROCEDURE — 99396 PREV VISIT EST AGE 40-64: CPT | Performed by: STUDENT IN AN ORGANIZED HEALTH CARE EDUCATION/TRAINING PROGRAM

## 2024-10-21 RX ORDER — BUTALBITAL, ACETAMINOPHEN AND CAFFEINE 50; 325; 40 MG/1; MG/1; MG/1
1 TABLET ORAL EVERY 6 HOURS PRN
Qty: 15 TABLET | Refills: 0 | Status: SHIPPED | OUTPATIENT
Start: 2024-10-21

## 2024-10-21 RX ORDER — CYCLOBENZAPRINE HCL 10 MG
10 TABLET ORAL
Qty: 30 TABLET | Refills: 1 | Status: SHIPPED | OUTPATIENT
Start: 2024-10-21

## 2024-10-21 NOTE — PROGRESS NOTES
Initial date of service: 7/10/24    Diagnoses and all orders for this visit:    Segmental dysfunction of sacral region    Sacroiliitis (HCC)    Degeneration of intervertebral disc of lumbar region with discogenic back pain    Hip flexor tendon tightness, left    Somatic dysfunction of costovertebral joint structure    Myofascial pain syndrome    Segmental dysfunction of thoracic region    Pt responded well to tx with reduced pain and increased ROM    TREATMENT: 65966, 31038  Ther-ex: IASTM; discussed post procedure soreness and/or ecchymosis for up to 36 hrs, applied to affected mm hypertonicities; cobra stretch, standing extensions, hip flexor pin-and-stretch, transitional mvmt education, abdominal bracing; greater than 15 min spent performing above mentioned ther-ex to improve ROM/flexibility. Thoracic mobilization/manipulation: prone P-A mob, supine A-P manip; Lumbar mobilization/manipulation: diversified side laying graded HVLA, extension-traction; SIJ Manipulation/Mobilization: L SIJ HVLA - long axis distraction, mack drop table maneuver to affected SIJ    HPI:  Krys Alex is a 49 y.o. female  Chief Complaint   Patient presents with    Neck - Pain     Neck discomfort on the left side. Pain score 2       Back Pain     Mid to lower lumbar on the left is off .  Pain score 2        Pt presents for tx fpor L sided neck to lower back pain after catching falling patient while bent and twisted to her right 4/26/24 while working for [employer]. Pt has undergone PT with modest benefit. Lumbar and Thoracic Spine MRIs 2024 demonstrate L4-L5: tiny right posterolateral annular fissure, small right foraminal disc protrusion. Mild facet arthropathy, trace facet joint effusions. Mild canal stenosis. Mild-to-moderate right and mild left foraminal narrowing L5-S1: Mild diffuse disc bulge, tiny right posterolateral annular fissure. Pt was seen by Pain Mgmt who referred here for consutl/tx  10/21: Pt reports  feeling and moving better since last tx    Back Pain  This is a new problem. The current episode started more than 1 month ago. The problem occurs constantly. The problem has been waxing and waning since onset. The pain is present in the gluteal, lumbar spine, sacro-iliac and thoracic spine. The quality of the pain is described as aching. The pain does not radiate. The pain is Worse during the day. The symptoms are aggravated by twisting and bending (transitional mvmts, prolonged walking/standing, uphill/downhill, cobra stretch; palliative includes rest, massage, traction, laying supine). Pertinent negatives include no bladder incontinence or bowel incontinence.   Shoulder Pain     Neck Pain       Past Medical History:   Diagnosis Date    COVID-19 05/2022    Finger fracture, left     Intrinsic muscle tightness 01/16/2019    Pure hypercholesterolemia 10/14/2021    Trigger middle finger of left hand 01/16/2019    UTI symptoms 10/14/2021    Vitamin D deficiency       Past Surgical History:   Procedure Laterality Date    LAPAROSCOPIC OVARIAN CYSTECTOMY Right 2001    Lysis of adhesions, Dr. Miranda    OVARIAN CYST REMOVAL Right 1993    open, Dr. Arce    TONSILECTOMY AND ADNOIDECTOMY      TONSILLECTOMY      WISDOM TOOTH EXTRACTION       The following portions of the patient's history were reviewed and updated as appropriate: allergies, past family history, past medical history, past social history, past surgical history, and problem list.  Review of Systems   Gastrointestinal:  Negative for bowel incontinence.   Genitourinary:  Negative for bladder incontinence.   Musculoskeletal:  Positive for back pain and neck pain.     Physical Exam  Musculoskeletal:      Thoracic back: Spasms and tenderness present. Decreased range of motion.      Lumbar back: Spasms and tenderness present. Decreased range of motion. Negative right straight leg raise test and negative left straight leg raise test.        Back:       Comments: Pnful and  limited in Rrot 10, Blf 15(contralaterally), Ext 15, Ext/Brot   Skin:     General: Skin is warm and dry.   Neurological:      Mental Status: She is alert and oriented to person, place, and time.      Gait: Gait is intact.   Psychiatric:         Mood and Affect: Mood normal.         Behavior: Behavior normal.       SOFT TISSUE ASSESSMENT Hypertonicity and tenderness palpated L T 3-7, 10-S1 erector spinae, L hip flexor, L glute med/min, L QL, L hamstring JOINT RESTRICTIONS: T3-7, 10-S1, L R6, L R9, and L SIJ     Return in about 1 week (around 10/28/2024) for Next scheduled follow up.

## 2024-10-24 ENCOUNTER — PROCEDURE VISIT (OUTPATIENT)
Age: 49
End: 2024-10-24
Payer: COMMERCIAL

## 2024-10-24 VITALS
OXYGEN SATURATION: 99 % | HEIGHT: 66 IN | WEIGHT: 154 LBS | SYSTOLIC BLOOD PRESSURE: 128 MMHG | HEART RATE: 80 BPM | BODY MASS INDEX: 24.75 KG/M2 | DIASTOLIC BLOOD PRESSURE: 76 MMHG

## 2024-10-24 DIAGNOSIS — M99.04 SEGMENTAL DYSFUNCTION OF SACRAL REGION: ICD-10-CM

## 2024-10-24 DIAGNOSIS — M99.02 SEGMENTAL DYSFUNCTION OF THORACIC REGION: ICD-10-CM

## 2024-10-24 DIAGNOSIS — M51.360 DEGENERATION OF INTERVERTEBRAL DISC OF LUMBAR REGION WITH DISCOGENIC BACK PAIN: ICD-10-CM

## 2024-10-24 DIAGNOSIS — M99.08 SOMATIC DYSFUNCTION OF COSTOVERTEBRAL JOINT STRUCTURE: ICD-10-CM

## 2024-10-24 DIAGNOSIS — M46.1 SACROILIITIS (HCC): Primary | ICD-10-CM

## 2024-10-24 DIAGNOSIS — M24.552 HIP FLEXOR TENDON TIGHTNESS, LEFT: ICD-10-CM

## 2024-10-24 DIAGNOSIS — M79.18 MYOFASCIAL PAIN SYNDROME: ICD-10-CM

## 2024-10-24 PROCEDURE — 97110 THERAPEUTIC EXERCISES: CPT | Performed by: CHIROPRACTOR

## 2024-10-24 PROCEDURE — 98941 CHIROPRACT MANJ 3-4 REGIONS: CPT | Performed by: CHIROPRACTOR

## 2024-10-25 NOTE — PROGRESS NOTES
Initial date of service: 7/10/24    Diagnoses and all orders for this visit:    Sacroiliitis (HCC)    Segmental dysfunction of sacral region    Degeneration of intervertebral disc of lumbar region with discogenic back pain    Hip flexor tendon tightness, left    Somatic dysfunction of costovertebral joint structure    Myofascial pain syndrome    Segmental dysfunction of thoracic region    Pt responded well to tx with reduced pain and increased ROM    TREATMENT: 84261, [id number]  Ther-ex: IASTM; discussed post procedure soreness and/or ecchymosis for up to 36 hrs, applied to affected mm hypertonicities; cobra stretch, standing extensions, hip flexor pin-and-stretch, transitional mvmt education, abdominal bracing; greater than 15 min spent performing above mentioned ther-ex to improve ROM/flexibility. Thoracic mobilization/manipulation: prone P-A mob, supine A-P manip; Lumbar mobilization/manipulation: diversified side laying graded HVLA, extension-traction; SIJ Manipulation/Mobilization: L SIJ HVLA - long axis distraction, mack drop table maneuver to affected SIJ    HPI:  Krys Alex is a 49 y.o. female  Chief Complaint   Patient presents with    Back Pain     Middle back pain-3  Lower back pain-3    Neck Pain     Neck pain-3     Pt presents for tx fpor L sided neck to lower back pain after catching falling patient while bent and twisted to her right 4/26/24 while working for [employer]. Pt has undergone PT with modest benefit. Lumbar and Thoracic Spine MRIs 2024 demonstrate L4-L5: tiny right posterolateral annular fissure, small right foraminal disc protrusion. Mild facet arthropathy, trace facet joint effusions. Mild canal stenosis. Mild-to-moderate right and mild left foraminal narrowing L5-S1: Mild diffuse disc bulge, tiny right posterolateral annular fissure. Pt was seen by Pain Mgmt who referred here for consutl/tx  10/25: Pt reports feeling and moving better since last tx, but suffered  flare-up    Back Pain  This is a new problem. The current episode started more than 1 month ago. The problem occurs constantly. The problem has been waxing and waning since onset. The pain is present in the gluteal, lumbar spine, sacro-iliac and thoracic spine. The quality of the pain is described as aching. The pain does not radiate. The pain is Worse during the day. The symptoms are aggravated by twisting and bending (transitional mvmts, prolonged walking/standing, uphill/downhill, cobra stretch; palliative includes rest, massage, traction, laying supine). Pertinent negatives include no bladder incontinence or bowel incontinence.   Shoulder Pain     Neck Pain       Past Medical History:   Diagnosis Date    COVID-19 05/2022    Finger fracture, left     Intrinsic muscle tightness 01/16/2019    Pure hypercholesterolemia 10/14/2021    Trigger middle finger of left hand 01/16/2019    UTI symptoms 10/14/2021    Vitamin D deficiency       Past Surgical History:   Procedure Laterality Date    LAPAROSCOPIC OVARIAN CYSTECTOMY Right 2001    Lysis of adhesions, Dr. Miranda    OVARIAN CYST REMOVAL Right 1993    open, Dr. Arce    TONSILECTOMY AND ADNOIDECTOMY      TONSILLECTOMY      WISDOM TOOTH EXTRACTION       The following portions of the patient's history were reviewed and updated as appropriate: allergies, past family history, past medical history, past social history, past surgical history, and problem list.  Review of Systems   Gastrointestinal:  Negative for bowel incontinence.   Genitourinary:  Negative for bladder incontinence.   Musculoskeletal:  Positive for back pain and neck pain.     Physical Exam  Musculoskeletal:      Thoracic back: Spasms and tenderness present. Decreased range of motion.      Lumbar back: Spasms and tenderness present. Decreased range of motion. Negative right straight leg raise test and negative left straight leg raise test.        Back:       Comments: Pnful and limited in Rrot 10, Blf  15(contralaterally), Ext 15, Ext/Brot   Skin:     General: Skin is warm and dry.   Neurological:      Mental Status: She is alert and oriented to person, place, and time.      Gait: Gait is intact.   Psychiatric:         Mood and Affect: Mood normal.         Behavior: Behavior normal.       SOFT TISSUE ASSESSMENT Hypertonicity and tenderness palpated L T 3-7, 10-S1 erector spinae, L hip flexor, L glute med/min, L QL, L hamstring JOINT RESTRICTIONS: T3-7, 10-S1, L R6, L R9, and L SIJ     Return in about 1 week (around 10/31/2024) for Next scheduled follow up.

## 2024-10-31 ENCOUNTER — PROCEDURE VISIT (OUTPATIENT)
Age: 49
End: 2024-10-31
Payer: COMMERCIAL

## 2024-10-31 VITALS
HEIGHT: 66 IN | SYSTOLIC BLOOD PRESSURE: 124 MMHG | BODY MASS INDEX: 24.75 KG/M2 | HEART RATE: 91 BPM | WEIGHT: 154 LBS | OXYGEN SATURATION: 98 % | DIASTOLIC BLOOD PRESSURE: 70 MMHG

## 2024-10-31 DIAGNOSIS — M99.08 SOMATIC DYSFUNCTION OF COSTOVERTEBRAL JOINT STRUCTURE: ICD-10-CM

## 2024-10-31 DIAGNOSIS — M99.04 SEGMENTAL DYSFUNCTION OF SACRAL REGION: ICD-10-CM

## 2024-10-31 DIAGNOSIS — M99.02 SEGMENTAL DYSFUNCTION OF THORACIC REGION: ICD-10-CM

## 2024-10-31 DIAGNOSIS — M79.18 MYOFASCIAL PAIN SYNDROME: ICD-10-CM

## 2024-10-31 DIAGNOSIS — M51.360 DEGENERATION OF INTERVERTEBRAL DISC OF LUMBAR REGION WITH DISCOGENIC BACK PAIN: ICD-10-CM

## 2024-10-31 DIAGNOSIS — M46.1 SACROILIITIS (HCC): Primary | ICD-10-CM

## 2024-10-31 DIAGNOSIS — M24.552 HIP FLEXOR TENDON TIGHTNESS, LEFT: ICD-10-CM

## 2024-10-31 PROCEDURE — 97110 THERAPEUTIC EXERCISES: CPT | Performed by: CHIROPRACTOR

## 2024-10-31 PROCEDURE — 98941 CHIROPRACT MANJ 3-4 REGIONS: CPT | Performed by: CHIROPRACTOR

## 2024-10-31 NOTE — PROGRESS NOTES
Initial date of service: 7/10/24    Diagnoses and all orders for this visit:    Sacroiliitis (HCC)    Segmental dysfunction of sacral region    Degeneration of intervertebral disc of lumbar region with discogenic back pain    Hip flexor tendon tightness, left    Somatic dysfunction of costovertebral joint structure    Myofascial pain syndrome    Segmental dysfunction of thoracic region    Pt responded well to tx with reduced pain and increased ROM; reduce tx frequency    TREATMENT: [id number], [id number]  Ther-ex: IASTM; discussed post procedure soreness and/or ecchymosis for up to 36 hrs, applied to affected mm hypertonicities; cobra stretch, standing extensions, hip flexor pin-and-stretch, transitional mvmt education, abdominal bracing; greater than 15 min spent performing above mentioned ther-ex to improve ROM/flexibility. Thoracic mobilization/manipulation: prone P-A mob, supine A-P manip; Lumbar mobilization/manipulation: diversified side laying graded HVLA, extension-traction; SIJ Manipulation/Mobilization: L SIJ HVLA - long axis distraction, mack drop table maneuver to affected SIJ    HPI:  Krys Alex is a 49 y.o. female  Chief Complaint   Patient presents with    Back Pain     Middle back pain-2  Lower back pain-2    Neck Pain     Neck pain-2     Pt presents for tx fpor L sided neck to lower back pain after catching falling patient while bent and twisted to her right 4/26/24 while working for [employer]. Pt has undergone PT with modest benefit. Lumbar and Thoracic Spine MRIs 2024 demonstrate L4-L5: tiny right posterolateral annular fissure, small right foraminal disc protrusion. Mild facet arthropathy, trace facet joint effusions. Mild canal stenosis. Mild-to-moderate right and mild left foraminal narrowing L5-S1: Mild diffuse disc bulge, tiny right posterolateral annular fissure. Pt was seen by Pain Mgmt who referred here for consutl/tx  10/31: Pt reports feeling and moving better since last  tx    Back Pain  This is a new problem. The current episode started more than 1 month ago. The problem occurs constantly. The problem has been waxing and waning since onset. The pain is present in the gluteal, lumbar spine, sacro-iliac and thoracic spine. The quality of the pain is described as aching. The pain does not radiate. The pain is Worse during the day. The symptoms are aggravated by twisting and bending (transitional mvmts, prolonged walking/standing, uphill/downhill, cobra stretch; palliative includes rest, massage, traction, laying supine). Pertinent negatives include no bladder incontinence or bowel incontinence.   Shoulder Pain     Neck Pain       Past Medical History:   Diagnosis Date    COVID-19 05/2022    Finger fracture, left     Intrinsic muscle tightness 01/16/2019    Pure hypercholesterolemia 10/14/2021    Trigger middle finger of left hand 01/16/2019    UTI symptoms 10/14/2021    Vitamin D deficiency       Past Surgical History:   Procedure Laterality Date    LAPAROSCOPIC OVARIAN CYSTECTOMY Right 2001    Lysis of adhesions, Dr. Miranda    OVARIAN CYST REMOVAL Right 1993    open, Dr. Arce    TONSILECTOMY AND ADNOIDECTOMY      TONSILLECTOMY      WISDOM TOOTH EXTRACTION       The following portions of the patient's history were reviewed and updated as appropriate: allergies, past family history, past medical history, past social history, past surgical history, and problem list.  Review of Systems   Gastrointestinal:  Negative for bowel incontinence.   Genitourinary:  Negative for bladder incontinence.   Musculoskeletal:  Positive for back pain and neck pain.     Physical Exam  Musculoskeletal:      Thoracic back: Spasms and tenderness present. Decreased range of motion.      Lumbar back: Spasms and tenderness present. Decreased range of motion. Negative right straight leg raise test and negative left straight leg raise test.        Back:       Comments: Pnful and limited in Rrot 10, Blf  15(contralaterally), Ext 15, Ext/Brot   Skin:     General: Skin is warm and dry.   Neurological:      Mental Status: She is alert and oriented to person, place, and time.      Gait: Gait is intact.   Psychiatric:         Mood and Affect: Mood normal.         Behavior: Behavior normal.       SOFT TISSUE ASSESSMENT Hypertonicity and tenderness palpated L T 3-7, 10-S1 erector spinae, L hip flexor, L glute med/min, L QL, L hamstring JOINT RESTRICTIONS: T3-7, 10-S1, L R6, L R9, and L SIJ     Return in about 2 weeks (around 11/14/2024) for Next scheduled follow up.

## 2024-11-07 ENCOUNTER — PROCEDURE VISIT (OUTPATIENT)
Age: 49
End: 2024-11-07
Payer: COMMERCIAL

## 2024-11-07 VITALS
HEIGHT: 66 IN | OXYGEN SATURATION: 99 % | SYSTOLIC BLOOD PRESSURE: 114 MMHG | HEART RATE: 81 BPM | DIASTOLIC BLOOD PRESSURE: 78 MMHG | BODY MASS INDEX: 24.77 KG/M2 | WEIGHT: 154.1 LBS

## 2024-11-07 DIAGNOSIS — M46.1 SACROILIITIS (HCC): Primary | ICD-10-CM

## 2024-11-07 DIAGNOSIS — M24.552 HIP FLEXOR TENDON TIGHTNESS, LEFT: ICD-10-CM

## 2024-11-07 DIAGNOSIS — M79.18 MYOFASCIAL PAIN SYNDROME: ICD-10-CM

## 2024-11-07 DIAGNOSIS — M99.02 SEGMENTAL DYSFUNCTION OF THORACIC REGION: ICD-10-CM

## 2024-11-07 DIAGNOSIS — M99.08 SOMATIC DYSFUNCTION OF COSTOVERTEBRAL JOINT STRUCTURE: ICD-10-CM

## 2024-11-07 DIAGNOSIS — M51.360 DEGENERATION OF INTERVERTEBRAL DISC OF LUMBAR REGION WITH DISCOGENIC BACK PAIN: ICD-10-CM

## 2024-11-07 DIAGNOSIS — M99.04 SEGMENTAL DYSFUNCTION OF SACRAL REGION: ICD-10-CM

## 2024-11-07 PROCEDURE — 97110 THERAPEUTIC EXERCISES: CPT | Performed by: CHIROPRACTOR

## 2024-11-07 PROCEDURE — 98941 CHIROPRACT MANJ 3-4 REGIONS: CPT | Performed by: CHIROPRACTOR

## 2024-11-07 NOTE — PROGRESS NOTES
Initial date of service: 7/10/24    Diagnoses and all orders for this visit:    Sacroiliitis (HCC)    Segmental dysfunction of sacral region    Degeneration of intervertebral disc of lumbar region with discogenic back pain    Hip flexor tendon tightness, left    Somatic dysfunction of costovertebral joint structure    Myofascial pain syndrome    Segmental dysfunction of thoracic region    Pt responded well to tx with reduced pain and increased ROM    TREATMENT: [id number], [id number]  Ther-ex: IASTM; discussed post procedure soreness and/or ecchymosis for up to 36 hrs, applied to affected mm hypertonicities; cobra stretch, standing extensions, hip flexor pin-and-stretch, transitional mvmt education, abdominal bracing; greater than 15 min spent performing above mentioned ther-ex to improve ROM/flexibility. Thoracic mobilization/manipulation: prone P-A mob, supine A-P manip; Lumbar mobilization/manipulation: diversified side laying graded HVLA, extension-traction; SIJ Manipulation/Mobilization: L SIJ HVLA - long axis distraction, mack drop table maneuver to affected SIJ    HPI:  Krys Alex is a 49 y.o. female  No chief complaint on file.    Pt presents for tx fpor L sided neck to lower back pain after catching falling patient while bent and twisted to her right 4/26/24 while working for [employer]. Pt has undergone PT with modest benefit. Lumbar and Thoracic Spine MRIs 2024 demonstrate L4-L5: tiny right posterolateral annular fissure, small right foraminal disc protrusion. Mild facet arthropathy, trace facet joint effusions. Mild canal stenosis. Mild-to-moderate right and mild left foraminal narrowing L5-S1: Mild diffuse disc bulge, tiny right posterolateral annular fissure. Pt was seen by Pain Mgmt who referred here for consutl/tx  11/7: Pt reports flare-up with stress of new job options    Back Pain  This is a new problem. The current episode started more than 1 month ago. The problem occurs constantly.  The problem has been waxing and waning since onset. The pain is present in the gluteal, lumbar spine, sacro-iliac and thoracic spine. The quality of the pain is described as aching. The pain does not radiate. The pain is Worse during the day. The symptoms are aggravated by twisting and bending (transitional mvmts, prolonged walking/standing, uphill/downhill, cobra stretch; palliative includes rest, massage, traction, laying supine). Pertinent negatives include no bladder incontinence or bowel incontinence.   Shoulder Pain     Neck Pain       Past Medical History:   Diagnosis Date    COVID-19 05/2022    Finger fracture, left     Intrinsic muscle tightness 01/16/2019    Pure hypercholesterolemia 10/14/2021    Trigger middle finger of left hand 01/16/2019    UTI symptoms 10/14/2021    Vitamin D deficiency       Past Surgical History:   Procedure Laterality Date    LAPAROSCOPIC OVARIAN CYSTECTOMY Right 2001    Lysis of adhesions, Dr. Miranda    OVARIAN CYST REMOVAL Right 1993    open, Dr. Arce    TONSILECTOMY AND ADNOIDECTOMY      TONSILLECTOMY      WISDOM TOOTH EXTRACTION       The following portions of the patient's history were reviewed and updated as appropriate: allergies, past family history, past medical history, past social history, past surgical history, and problem list.  Review of Systems   Gastrointestinal:  Negative for bowel incontinence.   Genitourinary:  Negative for bladder incontinence.   Musculoskeletal:  Positive for back pain and neck pain.     Physical Exam  Musculoskeletal:      Thoracic back: Spasms and tenderness present. Decreased range of motion.      Lumbar back: Spasms and tenderness present. Decreased range of motion. Negative right straight leg raise test and negative left straight leg raise test.        Back:       Comments: Pnful and limited in Rrot 10, Blf 15(contralaterally), Ext 15, Ext/Brot   Skin:     General: Skin is warm and dry.   Neurological:      Mental Status: She is alert and  oriented to person, place, and time.      Gait: Gait is intact.   Psychiatric:         Mood and Affect: Mood normal.         Behavior: Behavior normal.       SOFT TISSUE ASSESSMENT Hypertonicity and tenderness palpated L T 3-7, 10-S1 erector spinae, L hip flexor, L glute med/min, L QL, L hamstring JOINT RESTRICTIONS: T3-7, 10-S1, L R6, L R9, and L SIJ     No follow-ups on file.

## 2024-11-11 ENCOUNTER — PROCEDURE VISIT (OUTPATIENT)
Age: 49
End: 2024-11-11
Payer: COMMERCIAL

## 2024-11-11 VITALS
WEIGHT: 154 LBS | SYSTOLIC BLOOD PRESSURE: 115 MMHG | HEIGHT: 66 IN | BODY MASS INDEX: 24.75 KG/M2 | DIASTOLIC BLOOD PRESSURE: 77 MMHG

## 2024-11-11 DIAGNOSIS — M24.552 HIP FLEXOR TENDON TIGHTNESS, LEFT: ICD-10-CM

## 2024-11-11 DIAGNOSIS — M99.02 SEGMENTAL DYSFUNCTION OF THORACIC REGION: ICD-10-CM

## 2024-11-11 DIAGNOSIS — M46.1 SACROILIITIS (HCC): Primary | ICD-10-CM

## 2024-11-11 DIAGNOSIS — M99.08 SOMATIC DYSFUNCTION OF COSTOVERTEBRAL JOINT STRUCTURE: ICD-10-CM

## 2024-11-11 DIAGNOSIS — M79.18 MYOFASCIAL PAIN SYNDROME: ICD-10-CM

## 2024-11-11 DIAGNOSIS — M51.360 DEGENERATION OF INTERVERTEBRAL DISC OF LUMBAR REGION WITH DISCOGENIC BACK PAIN: ICD-10-CM

## 2024-11-11 DIAGNOSIS — M99.04 SEGMENTAL DYSFUNCTION OF SACRAL REGION: ICD-10-CM

## 2024-11-11 PROCEDURE — 97110 THERAPEUTIC EXERCISES: CPT | Performed by: CHIROPRACTOR

## 2024-11-11 PROCEDURE — 98941 CHIROPRACT MANJ 3-4 REGIONS: CPT | Performed by: CHIROPRACTOR

## 2024-11-11 NOTE — PROGRESS NOTES
Initial date of service: 7/10/24    Diagnoses and all orders for this visit:    Sacroiliitis (HCC)    Segmental dysfunction of sacral region    Degeneration of intervertebral disc of lumbar region with discogenic back pain    Hip flexor tendon tightness, left    Somatic dysfunction of costovertebral joint structure    Myofascial pain syndrome    Segmental dysfunction of thoracic region    Pt responded well to tx with reduced pain and increased ROM    TREATMENT: [id number], [id number]  Ther-ex: IASTM; discussed post procedure soreness and/or ecchymosis for up to 36 hrs, applied to affected mm hypertonicities; cobra stretch, standing extensions, hip flexor pin-and-stretch, transitional mvmt education, abdominal bracing; greater than 15 min spent performing above mentioned ther-ex to improve ROM/flexibility. Thoracic mobilization/manipulation: prone P-A mob, supine A-P manip; Lumbar mobilization/manipulation: diversified side laying graded HVLA, extension-traction; SIJ Manipulation/Mobilization: L SIJ HVLA - long axis distraction, mack drop table maneuver to affected SIJ    HPI:  Krys Alex is a 49 y.o. female  Chief Complaint   Patient presents with    Neck Pain     Neck and thoracic pain left side and middle about a 6      Pt presents for tx fpor L sided neck to lower back pain after catching falling patient while bent and twisted to her right 4/26/24 while working for [employer]. Pt has undergone PT with modest benefit. Lumbar and Thoracic Spine MRIs 2024 demonstrate L4-L5: tiny right posterolateral annular fissure, small right foraminal disc protrusion. Mild facet arthropathy, trace facet joint effusions. Mild canal stenosis. Mild-to-moderate right and mild left foraminal narrowing L5-S1: Mild diffuse disc bulge, tiny right posterolateral annular fissure. Pt was seen by Pain Mgmt who referred here for consutl/tx  11/11: Pt reports flare-up with stress of new job options    Back Pain  This is a new  problem. The current episode started more than 1 month ago. The problem occurs constantly. The problem has been waxing and waning since onset. The pain is present in the gluteal, lumbar spine, sacro-iliac and thoracic spine. The quality of the pain is described as aching. The pain does not radiate. The pain is Worse during the day. The symptoms are aggravated by twisting and bending (transitional mvmts, prolonged walking/standing, uphill/downhill, cobra stretch; palliative includes rest, massage, traction, laying supine). Pertinent negatives include no bladder incontinence or bowel incontinence.   Shoulder Pain     Neck Pain       Past Medical History:   Diagnosis Date    COVID-19 05/2022    Finger fracture, left     Intrinsic muscle tightness 01/16/2019    Pure hypercholesterolemia 10/14/2021    Trigger middle finger of left hand 01/16/2019    UTI symptoms 10/14/2021    Vitamin D deficiency       Past Surgical History:   Procedure Laterality Date    LAPAROSCOPIC OVARIAN CYSTECTOMY Right 2001    Lysis of adhesions, Dr. Miranda    OVARIAN CYST REMOVAL Right 1993    open, Dr. Arce    TONSILECTOMY AND ADNOIDECTOMY      TONSILLECTOMY      WISDOM TOOTH EXTRACTION       The following portions of the patient's history were reviewed and updated as appropriate: allergies, past family history, past medical history, past social history, past surgical history, and problem list.  Review of Systems   Gastrointestinal:  Negative for bowel incontinence.   Genitourinary:  Negative for bladder incontinence.   Musculoskeletal:  Positive for back pain and neck pain.     Physical Exam  Musculoskeletal:      Thoracic back: Spasms and tenderness present. Decreased range of motion.      Lumbar back: Spasms and tenderness present. Decreased range of motion. Negative right straight leg raise test and negative left straight leg raise test.        Back:       Comments: Pnful and limited in Rrot 10, Blf 15(contralaterally), Ext 15, Ext/Brot    Skin:     General: Skin is warm and dry.   Neurological:      Mental Status: She is alert and oriented to person, place, and time.      Gait: Gait is intact.   Psychiatric:         Mood and Affect: Mood normal.         Behavior: Behavior normal.       SOFT TISSUE ASSESSMENT Hypertonicity and tenderness palpated L T 3-7, 10-S1 erector spinae, L hip flexor, L glute med/min, L QL, L hamstring JOINT RESTRICTIONS: T3-7, 10-S1, L R6, L R9, and L SIJ     Return in about 2 weeks (around 11/25/2024) for Next scheduled follow up.

## 2024-11-14 ENCOUNTER — PROCEDURE VISIT (OUTPATIENT)
Age: 49
End: 2024-11-14
Payer: COMMERCIAL

## 2024-11-14 VITALS — HEART RATE: 107 BPM | WEIGHT: 151 LBS | OXYGEN SATURATION: 97 % | HEIGHT: 66 IN | BODY MASS INDEX: 24.27 KG/M2

## 2024-11-14 DIAGNOSIS — M99.02 SEGMENTAL DYSFUNCTION OF THORACIC REGION: ICD-10-CM

## 2024-11-14 DIAGNOSIS — M79.18 MYOFASCIAL PAIN SYNDROME: ICD-10-CM

## 2024-11-14 DIAGNOSIS — M99.04 SEGMENTAL DYSFUNCTION OF SACRAL REGION: ICD-10-CM

## 2024-11-14 DIAGNOSIS — M24.552 HIP FLEXOR TENDON TIGHTNESS, LEFT: ICD-10-CM

## 2024-11-14 DIAGNOSIS — M99.08 SOMATIC DYSFUNCTION OF COSTOVERTEBRAL JOINT STRUCTURE: ICD-10-CM

## 2024-11-14 DIAGNOSIS — M46.1 SACROILIITIS (HCC): Primary | ICD-10-CM

## 2024-11-14 PROCEDURE — 98941 CHIROPRACT MANJ 3-4 REGIONS: CPT | Performed by: CHIROPRACTOR

## 2024-11-14 PROCEDURE — 97110 THERAPEUTIC EXERCISES: CPT | Performed by: CHIROPRACTOR

## 2024-11-14 NOTE — PROGRESS NOTES
Initial date of service: 7/10/24    Diagnoses and all orders for this visit:    Sacroiliitis (HCC)    Segmental dysfunction of sacral region    Hip flexor tendon tightness, left    Somatic dysfunction of costovertebral joint structure    Myofascial pain syndrome    Segmental dysfunction of thoracic region    Pt responded well to tx with reduced pain and increased ROM    TREATMENT: 10964, 31020  Ther-ex: IASTM; discussed post procedure soreness and/or ecchymosis for up to 36 hrs, applied to affected mm hypertonicities; cobra stretch, standing extensions, hip flexor pin-and-stretch, transitional mvmt education, abdominal bracing; greater than 15 min spent performing above mentioned ther-ex to improve ROM/flexibility. Thoracic mobilization/manipulation: prone P-A mob, supine A-P manip; Lumbar mobilization/manipulation: diversified side laying graded HVLA, extension-traction; SIJ Manipulation/Mobilization: L SIJ HVLA - long axis distraction, mack drop table maneuver to affected SIJ    HPI:  Krys Alex is a 49 y.o. female  Chief Complaint   Patient presents with    Back Pain     Middle back pain-6  Lower back pain-6    Neck Pain     Neck pain-6     Pt presents for tx fpor L sided neck to lower back pain after catching falling patient while bent and twisted to her right 4/26/24 while working for [employer]. Pt has undergone PT with modest benefit. Lumbar and Thoracic Spine MRIs 2024 demonstrate L4-L5: tiny right posterolateral annular fissure, small right foraminal disc protrusion. Mild facet arthropathy, trace facet joint effusions. Mild canal stenosis. Mild-to-moderate right and mild left foraminal narrowing L5-S1: Mild diffuse disc bulge, tiny right posterolateral annular fissure. Pt was seen by Pain Mgmt who referred here for consutl/tx  11/14: Pt reports flare-up with stress of new job options    Back Pain  This is a new problem. The current episode started more than 1 month ago. The problem  occurs constantly. The problem has been waxing and waning since onset. The pain is present in the gluteal, lumbar spine, sacro-iliac and thoracic spine. The quality of the pain is described as aching. The pain does not radiate. The pain is Worse during the day. The symptoms are aggravated by twisting and bending (transitional mvmts, prolonged walking/standing, uphill/downhill, cobra stretch; palliative includes rest, massage, traction, laying supine). Pertinent negatives include no bladder incontinence or bowel incontinence.   Shoulder Pain     Neck Pain       Past Medical History:   Diagnosis Date    COVID-19 05/2022    Finger fracture, left     Intrinsic muscle tightness 01/16/2019    Pure hypercholesterolemia 10/14/2021    Trigger middle finger of left hand 01/16/2019    UTI symptoms 10/14/2021    Vitamin D deficiency       Past Surgical History:   Procedure Laterality Date    LAPAROSCOPIC OVARIAN CYSTECTOMY Right 2001    Lysis of adhesions, Dr. Miranda    OVARIAN CYST REMOVAL Right 1993    open, Dr. Arce    TONSILECTOMY AND ADNOIDECTOMY      TONSILLECTOMY      WISDOM TOOTH EXTRACTION       The following portions of the patient's history were reviewed and updated as appropriate: allergies, past family history, past medical history, past social history, past surgical history, and problem list.  Review of Systems   Gastrointestinal:  Negative for bowel incontinence.   Genitourinary:  Negative for bladder incontinence.   Musculoskeletal:  Positive for back pain and neck pain.     Physical Exam  Musculoskeletal:      Thoracic back: Spasms and tenderness present. Decreased range of motion.      Lumbar back: Spasms and tenderness present. Decreased range of motion. Negative right straight leg raise test and negative left straight leg raise test.        Back:       Comments: Pnful and limited in Rrot 10, Blf 15(contralaterally), Ext 15, Ext/Brot   Skin:     General: Skin is warm and dry.   Neurological:      Mental Status:  She is alert and oriented to person, place, and time.      Gait: Gait is intact.   Psychiatric:         Mood and Affect: Mood normal.         Behavior: Behavior normal.       SOFT TISSUE ASSESSMENT Hypertonicity and tenderness palpated L T 3-7, 10-S1 erector spinae, L hip flexor, L glute med/min, L QL, L hamstring JOINT RESTRICTIONS: T3-7, 10-S1, L R6, L R9, and L SIJ     Return in about 1 week (around 11/21/2024) for Next scheduled follow up.

## 2024-11-25 ENCOUNTER — HOSPITAL ENCOUNTER (OUTPATIENT)
Dept: RADIOLOGY | Age: 49
Discharge: HOME | End: 2024-11-25
Payer: COMMERCIAL

## 2024-11-25 VITALS — BODY MASS INDEX: 23.78 KG/M2 | HEIGHT: 66 IN | WEIGHT: 148 LBS

## 2024-11-25 DIAGNOSIS — Z12.31 VISIT FOR SCREENING MAMMOGRAM: ICD-10-CM

## 2024-11-25 PROCEDURE — 77063 BREAST TOMOSYNTHESIS BI: CPT

## 2024-11-25 PROCEDURE — 77067 SCR MAMMO BI INCL CAD: CPT

## 2024-12-03 ENCOUNTER — ANNUAL EXAM (OUTPATIENT)
Dept: OBGYN CLINIC | Facility: CLINIC | Age: 49
End: 2024-12-03
Payer: COMMERCIAL

## 2024-12-03 ENCOUNTER — PROCEDURE VISIT (OUTPATIENT)
Age: 49
End: 2024-12-03
Payer: COMMERCIAL

## 2024-12-03 VITALS
WEIGHT: 150.8 LBS | SYSTOLIC BLOOD PRESSURE: 118 MMHG | DIASTOLIC BLOOD PRESSURE: 72 MMHG | HEIGHT: 66 IN | BODY MASS INDEX: 24.23 KG/M2

## 2024-12-03 VITALS
OXYGEN SATURATION: 99 % | WEIGHT: 148 LBS | BODY MASS INDEX: 23.78 KG/M2 | DIASTOLIC BLOOD PRESSURE: 72 MMHG | HEART RATE: 83 BPM | HEIGHT: 66 IN | SYSTOLIC BLOOD PRESSURE: 122 MMHG

## 2024-12-03 DIAGNOSIS — Z12.31 ENCOUNTER FOR SCREENING MAMMOGRAM FOR MALIGNANT NEOPLASM OF BREAST: ICD-10-CM

## 2024-12-03 DIAGNOSIS — M46.1 SACROILIITIS (HCC): Primary | ICD-10-CM

## 2024-12-03 DIAGNOSIS — Z01.419 ENCOUNTER FOR ANNUAL ROUTINE GYNECOLOGICAL EXAMINATION: Primary | ICD-10-CM

## 2024-12-03 DIAGNOSIS — M99.04 SEGMENTAL DYSFUNCTION OF SACRAL REGION: ICD-10-CM

## 2024-12-03 DIAGNOSIS — M99.08 SOMATIC DYSFUNCTION OF COSTOVERTEBRAL JOINT STRUCTURE: ICD-10-CM

## 2024-12-03 DIAGNOSIS — M24.552 HIP FLEXOR TENDON TIGHTNESS, LEFT: ICD-10-CM

## 2024-12-03 DIAGNOSIS — Z12.11 COLON CANCER SCREENING: ICD-10-CM

## 2024-12-03 DIAGNOSIS — M79.18 MYOFASCIAL PAIN SYNDROME: ICD-10-CM

## 2024-12-03 DIAGNOSIS — M99.02 SEGMENTAL DYSFUNCTION OF THORACIC REGION: ICD-10-CM

## 2024-12-03 PROCEDURE — 98941 CHIROPRACT MANJ 3-4 REGIONS: CPT | Performed by: CHIROPRACTOR

## 2024-12-03 PROCEDURE — 97110 THERAPEUTIC EXERCISES: CPT | Performed by: CHIROPRACTOR

## 2024-12-03 PROCEDURE — S0612 ANNUAL GYNECOLOGICAL EXAMINA: HCPCS | Performed by: OBSTETRICS & GYNECOLOGY

## 2024-12-03 PROCEDURE — G0145 SCR C/V CYTO,THINLAYER,RESCR: HCPCS | Performed by: OBSTETRICS & GYNECOLOGY

## 2024-12-03 RX ORDER — AZELASTINE 1 MG/ML
1 SPRAY, METERED NASAL 2 TIMES DAILY
COMMUNITY

## 2024-12-03 NOTE — PROGRESS NOTES
Name: Krys Alex      : 1975      MRN: [id number]  Encounter Provider: Kitty Belcher DO  Encounter Date: 12/3/2024   Encounter department: [facility]  :  Assessment & Plan  Encounter for annual routine gynecological examination         Encounter for screening mammogram for malignant neoplasm of breast         Colon cancer screening    Orders:    Cologuard    Pap done cytology reflex HPV as well as annual.  Encouraged self breast examination as well as calcium supplementation.  Continue annual mammogram.  Reviewed colon cancer screening, offered Cologuard versus colonoscopy.  All questions answered.  She will continue to follow-up with primary care as scheduled.  Return to office in 1 year or as needed    History of Present Illness     HPI  Krys Alex is a 49 y.o. female who presents     This is a 49-year-old female P2 ( x 2, age 14, 12) presents for her GYN exam.  She went through menopause at age 48.  She has never been on hormone replacement therapy.  She denies any vaginal bleeding or spotting.  She is getting hot flashes, slightly improved.  There is been no changes in bowel or bladder function.  Patient is  has not been sexually active.  Pap smears have been normal.    Pap     Mammo 2024, scheduled     Colon never    She has been under a lot of stress.  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  History obtained from: patient    Review of Systems   Constitutional:  Negative for fatigue, fever and unexpected weight change.   Respiratory:  Negative for cough, chest tightness, shortness of breath and wheezing.    Cardiovascular: Negative.  Negative for chest pain and palpitations.   Gastrointestinal: Negative.  Negative for abdominal distention, abdominal pain, blood in stool, constipation, diarrhea, nausea and vomiting.   Genitourinary: Negative.  Negative for difficulty  "urinating, dyspareunia, dysuria, flank pain, frequency, genital sores, hematuria, pelvic pain, urgency, vaginal bleeding, vaginal discharge and vaginal pain.   Skin:  Negative for rash.     Current Outpatient Medications on File Prior to Visit   Medication Sig Dispense Refill    azelastine (ASTELIN) 0.1 % nasal spray 1 spray into each nostril 2 (two) times a day Use in each nostril as directed      butalbital-acetaminophen-caffeine (FIORICET,ESGIC) -40 mg per tablet Take 1 tablet by mouth every 6 (six) hours as needed for headaches 15 tablet 0    Cholecalciferol (VITAMIN D3 PO) Take by mouth      cyclobenzaprine (FLEXERIL) 10 mg tablet Take 1 tablet (10 mg total) by mouth daily at bedtime 30 tablet 1    ibuprofen (MOTRIN) 800 mg tablet Take 1 tablet (800 mg total) by mouth 3 (three) times a day 21 tablet 0    loratadine (Claritin) 10 mg tablet       Triamcinolone Acetonide (Nasacort Allergy 24HR) 55 MCG/ACT nasal spray       diazepam (VALIUM) 5 mg tablet Take 2 tablets (10 mg total) by mouth every 8 (eight) hours as needed for anxiety (BACK SPASM) for up to 9 days (Patient not taking: Reported on 12/3/2024) 20 tablet 0    meclizine (ANTIVERT) 12.5 MG tablet Take 1 tablet (12.5 mg total) by mouth 3 (three) times a day as needed for dizziness (Patient not taking: Reported on 12/4/2023) 30 tablet 0    tobramycin-dexamethasone (TOBRADEX) ophthalmic suspension Administer 1 drop into the left eye every 4 (four) hours while awake (Patient not taking: Reported on 12/3/2024) 2.5 mL 0     No current facility-administered medications on file prior to visit.         Objective   /72   Ht 5' 6\" (1.676 m)   Wt 68.4 kg (150 lb 12.8 oz)   LMP 03/01/2023 (Approximate)   BMI 24.34 kg/m²      Physical Exam  Constitutional:       Appearance: Normal appearance. She is well-developed.   HENT:      Head: Normocephalic and atraumatic.   Cardiovascular:      Rate and Rhythm: Normal rate and regular rhythm.   Pulmonary:      " Effort: Pulmonary effort is normal.      Breath sounds: Normal breath sounds.   Chest:   Breasts:     Right: No inverted nipple, mass, nipple discharge, skin change or tenderness.      Left: No inverted nipple, mass, nipple discharge, skin change or tenderness.   Abdominal:      General: Bowel sounds are normal. There is no distension.      Palpations: Abdomen is soft.      Tenderness: There is no abdominal tenderness. There is no guarding or rebound.   Genitourinary:     Labia:         Right: No rash, tenderness or lesion.         Left: No rash, tenderness or lesion.       Vagina: Normal. No signs of injury. No vaginal discharge or tenderness.      Cervix: No cervical motion tenderness, discharge, friability, lesion or cervical bleeding.      Uterus: Not enlarged and not tender.       Adnexa:         Right: No mass, tenderness or fullness.          Left: No mass, tenderness or fullness.     Neurological:      Mental Status: She is alert.   Psychiatric:         Behavior: Behavior normal.         Administrative Statements   I have spent a total time of  minutes in caring for this patient on the day of the visit/encounter including Impressions, Counseling / Coordination of care, Documenting in the medical record, Reviewing / ordering tests, medicine, procedures  , and Obtaining or reviewing history  .

## 2024-12-03 NOTE — PROGRESS NOTES
Initial date of service: 7/10/24    Diagnoses and all orders for this visit:    Sacroiliitis (HCC)    Segmental dysfunction of sacral region    Hip flexor tendon tightness, left    Somatic dysfunction of costovertebral joint structure    Myofascial pain syndrome    Segmental dysfunction of thoracic region    Pt responded well to tx with reduced pain and increased ROM    TREATMENT: 42903, 09457  Ther-ex: IASTM; discussed post procedure soreness and/or ecchymosis for up to 36 hrs, applied to affected mm hypertonicities; cobra stretch, standing extensions, hip flexor pin-and-stretch, transitional mvmt education, abdominal bracing; greater than 15 min spent performing above mentioned ther-ex to improve ROM/flexibility. Thoracic mobilization/manipulation: prone P-A mob, supine A-P manip; Lumbar mobilization/manipulation: diversified side laying graded HVLA, extension-traction; SIJ Manipulation/Mobilization: L SIJ HVLA - long axis distraction, mack drop table maneuver to affected SIJ    HPI:  Krys Alex is a 49 y.o. female  Chief Complaint   Patient presents with    Back Pain     Middle back pain-6  Lower back pain-6    Neck Pain     Neck pain-6     Pt presents for tx fpor L sided neck to lower back pain after catching falling patient while bent and twisted to her right 4/26/24 while working for [employer]. Pt has undergone PT with modest benefit. Lumbar and Thoracic Spine MRIs 2024 demonstrate L4-L5: tiny right posterolateral annular fissure, small right foraminal disc protrusion. Mild facet arthropathy, trace facet joint effusions. Mild canal stenosis. Mild-to-moderate right and mild left foraminal narrowing L5-S1: Mild diffuse disc bulge, tiny right posterolateral annular fissure. Pt was seen by Pain Mgmt who referred here for consutl/tx  12/3: Pt reports feeling better over vacation but suffered flare-up travelling back    Back Pain  This is a new problem. The current episode started more than 1  month ago. The problem occurs constantly. The problem has been waxing and waning since onset. The pain is present in the gluteal, lumbar spine, sacro-iliac and thoracic spine. The quality of the pain is described as aching. The pain does not radiate. The pain is Worse during the day. The symptoms are aggravated by twisting and bending (transitional mvmts, prolonged walking/standing, uphill/downhill, cobra stretch; palliative includes rest, massage, traction, laying supine). Pertinent negatives include no bladder incontinence or bowel incontinence.   Shoulder Pain     Neck Pain       Past Medical History:   Diagnosis Date    COVID-19 05/2022    Finger fracture, left     Intrinsic muscle tightness 01/16/2019    Pure hypercholesterolemia 10/14/2021    Trigger middle finger of left hand 01/16/2019    UTI symptoms 10/14/2021    Vitamin D deficiency       Past Surgical History:   Procedure Laterality Date    LAPAROSCOPIC OVARIAN CYSTECTOMY Right 2001    Lysis of adhesions, Dr. Miranda    OVARIAN CYST REMOVAL Right 1993    open, Dr. Arce    TONSILECTOMY AND ADNOIDECTOMY      TONSILLECTOMY      WISDOM TOOTH EXTRACTION       The following portions of the patient's history were reviewed and updated as appropriate: allergies, past family history, past medical history, past social history, past surgical history, and problem list.  Review of Systems   Gastrointestinal:  Negative for bowel incontinence.   Genitourinary:  Negative for bladder incontinence.   Musculoskeletal:  Positive for back pain and neck pain.     Physical Exam  Musculoskeletal:      Thoracic back: Spasms and tenderness present. Decreased range of motion.      Lumbar back: Spasms and tenderness present. Decreased range of motion. Negative right straight leg raise test and negative left straight leg raise test.        Back:       Comments: Pnful and limited in Rrot 10, Blf 15(contralaterally), Ext 15, Ext/Brot   Skin:     General: Skin is warm and dry.    Neurological:      Mental Status: She is alert and oriented to person, place, and time.      Gait: Gait is intact.   Psychiatric:         Mood and Affect: Mood normal.         Behavior: Behavior normal.       SOFT TISSUE ASSESSMENT Hypertonicity and tenderness palpated L T 3-7, 10-S1 erector spinae, L hip flexor, L glute med/min, L QL, L hamstring JOINT RESTRICTIONS: T3-7, 10-S1, L R6, L R9, and L SIJ     Return in about 1 week (around 12/10/2024) for Next scheduled follow up.

## 2024-12-09 LAB
LAB AP GYN PRIMARY INTERPRETATION: NORMAL
Lab: NORMAL

## 2024-12-10 ENCOUNTER — PROCEDURE VISIT (OUTPATIENT)
Age: 49
End: 2024-12-10
Payer: COMMERCIAL

## 2024-12-10 VITALS
DIASTOLIC BLOOD PRESSURE: 82 MMHG | HEIGHT: 66 IN | SYSTOLIC BLOOD PRESSURE: 118 MMHG | BODY MASS INDEX: 24.11 KG/M2 | HEART RATE: 92 BPM | WEIGHT: 150 LBS | OXYGEN SATURATION: 99 %

## 2024-12-10 DIAGNOSIS — M79.18 MYOFASCIAL PAIN SYNDROME: ICD-10-CM

## 2024-12-10 DIAGNOSIS — M24.552 HIP FLEXOR TENDON TIGHTNESS, LEFT: ICD-10-CM

## 2024-12-10 DIAGNOSIS — M99.04 SEGMENTAL DYSFUNCTION OF SACRAL REGION: ICD-10-CM

## 2024-12-10 DIAGNOSIS — M46.1 SACROILIITIS (HCC): Primary | ICD-10-CM

## 2024-12-10 DIAGNOSIS — M99.02 SEGMENTAL DYSFUNCTION OF THORACIC REGION: ICD-10-CM

## 2024-12-10 DIAGNOSIS — M99.08 SOMATIC DYSFUNCTION OF COSTOVERTEBRAL JOINT STRUCTURE: ICD-10-CM

## 2024-12-10 PROCEDURE — 98941 CHIROPRACT MANJ 3-4 REGIONS: CPT | Performed by: CHIROPRACTOR

## 2024-12-10 PROCEDURE — 97110 THERAPEUTIC EXERCISES: CPT | Performed by: CHIROPRACTOR

## 2024-12-10 NOTE — PROGRESS NOTES
Initial date of service: 7/10/24    Diagnoses and all orders for this visit:    Sacroiliitis (HCC)    Segmental dysfunction of sacral region    Hip flexor tendon tightness, left    Somatic dysfunction of costovertebral joint structure    Myofascial pain syndrome    Segmental dysfunction of thoracic region    Pt responded well to tx with reduced pain and increased ROM    TREATMENT: 25756, 82662  Ther-ex: IASTM; discussed post procedure soreness and/or ecchymosis for up to 36 hrs, applied to affected mm hypertonicities; cobra stretch, standing extensions, hip flexor pin-and-stretch, transitional mvmt education, abdominal bracing; greater than 15 min spent performing above mentioned ther-ex to improve ROM/flexibility. Thoracic mobilization/manipulation: prone P-A mob, supine A-P manip; Lumbar mobilization/manipulation: diversified side laying graded HVLA, extension-traction; SIJ Manipulation/Mobilization: L SIJ HVLA - long axis distraction, mack drop table maneuver to affected SIJ    HPI:  Krys Alex is a 49 y.o. female  Chief Complaint   Patient presents with    Back Pain     Middle back pain-4  Lower back pain-4    Neck Pain     Neck pain-4     Pt presents for tx fpor L sided neck to lower back pain after catching falling patient while bent and twisted to her right 4/26/24 while working for [employer]. Pt has undergone PT with modest benefit. Lumbar and Thoracic Spine MRIs 2024 demonstrate L4-L5: tiny right posterolateral annular fissure, small right foraminal disc protrusion. Mild facet arthropathy, trace facet joint effusions. Mild canal stenosis. Mild-to-moderate right and mild left foraminal narrowing L5-S1: Mild diffuse disc bulge, tiny right posterolateral annular fissure. Pt was seen by Pain Mgmt who referred here for consutl/tx  12/10: Pt reports flare-up    Back Pain  This is a new problem. The current episode started more than 1 month ago. The problem occurs constantly. The problem  has been waxing and waning since onset. The pain is present in the gluteal, lumbar spine, sacro-iliac and thoracic spine. The quality of the pain is described as aching. The pain does not radiate. The pain is Worse during the day. The symptoms are aggravated by twisting and bending (transitional mvmts, prolonged walking/standing, uphill/downhill, cobra stretch; palliative includes rest, massage, traction, laying supine). Pertinent negatives include no bladder incontinence or bowel incontinence.   Shoulder Pain     Neck Pain       Past Medical History:   Diagnosis Date    COVID-19 05/2022    Finger fracture, left     Intrinsic muscle tightness 01/16/2019    Pure hypercholesterolemia 10/14/2021    Trigger middle finger of left hand 01/16/2019    UTI symptoms 10/14/2021    Vitamin D deficiency       Past Surgical History:   Procedure Laterality Date    LAPAROSCOPIC OVARIAN CYSTECTOMY Right 2001    Lysis of adhesions, Dr. Miranda    OVARIAN CYST REMOVAL Right 1993    open, Dr. Arce    TONSILECTOMY AND ADNOIDECTOMY      TONSILLECTOMY      WISDOM TOOTH EXTRACTION       The following portions of the patient's history were reviewed and updated as appropriate: allergies, past family history, past medical history, past social history, past surgical history, and problem list.  Review of Systems   Gastrointestinal:  Negative for bowel incontinence.   Genitourinary:  Negative for bladder incontinence.   Musculoskeletal:  Positive for back pain and neck pain.     Physical Exam  Musculoskeletal:      Thoracic back: Spasms and tenderness present. Decreased range of motion.      Lumbar back: Spasms and tenderness present. Decreased range of motion. Negative right straight leg raise test and negative left straight leg raise test.        Back:       Comments: Pnful and limited in Rrot 10, Blf 15(contralaterally), Ext 15, Ext/Brot   Skin:     General: Skin is warm and dry.   Neurological:      Mental Status: She is alert and oriented to  person, place, and time.      Gait: Gait is intact.   Psychiatric:         Mood and Affect: Mood normal.         Behavior: Behavior normal.       SOFT TISSUE ASSESSMENT Hypertonicity and tenderness palpated L T 3-7, 10-S1 erector spinae, L hip flexor, L glute med/min, L QL, L hamstring JOINT RESTRICTIONS: T3-7, 10-S1, L R6, L R9, and L SIJ     Return in about 2 weeks (around 12/24/2024) for Next scheduled follow up.

## 2024-12-12 ENCOUNTER — PROCEDURE VISIT (OUTPATIENT)
Age: 49
End: 2024-12-12
Payer: COMMERCIAL

## 2024-12-12 VITALS
OXYGEN SATURATION: 98 % | BODY MASS INDEX: 24.11 KG/M2 | HEART RATE: 80 BPM | DIASTOLIC BLOOD PRESSURE: 72 MMHG | SYSTOLIC BLOOD PRESSURE: 116 MMHG | WEIGHT: 150 LBS | HEIGHT: 66 IN

## 2024-12-12 DIAGNOSIS — M99.08 SOMATIC DYSFUNCTION OF COSTOVERTEBRAL JOINT STRUCTURE: ICD-10-CM

## 2024-12-12 DIAGNOSIS — M99.04 SEGMENTAL DYSFUNCTION OF SACRAL REGION: ICD-10-CM

## 2024-12-12 DIAGNOSIS — M99.02 SEGMENTAL DYSFUNCTION OF THORACIC REGION: ICD-10-CM

## 2024-12-12 DIAGNOSIS — M24.552 HIP FLEXOR TENDON TIGHTNESS, LEFT: ICD-10-CM

## 2024-12-12 DIAGNOSIS — M79.18 MYOFASCIAL PAIN SYNDROME: ICD-10-CM

## 2024-12-12 DIAGNOSIS — M46.1 SACROILIITIS (HCC): Primary | ICD-10-CM

## 2024-12-12 PROCEDURE — 97110 THERAPEUTIC EXERCISES: CPT | Performed by: CHIROPRACTOR

## 2024-12-12 PROCEDURE — 98941 CHIROPRACT MANJ 3-4 REGIONS: CPT | Performed by: CHIROPRACTOR

## 2024-12-13 NOTE — PROGRESS NOTES
Initial date of service: 7/10/24    Diagnoses and all orders for this visit:    Sacroiliitis (HCC)    Segmental dysfunction of sacral region    Hip flexor tendon tightness, left    Somatic dysfunction of costovertebral joint structure    Myofascial pain syndrome    Segmental dysfunction of thoracic region    Pt responded well to tx with reduced pain and increased ROM    TREATMENT: 03274, 65937  Ther-ex: IASTM; discussed post procedure soreness and/or ecchymosis for up to 36 hrs, applied to affected mm hypertonicities; cobra stretch, standing extensions, hip flexor pin-and-stretch, transitional mvmt education, abdominal bracing; greater than 15 min spent performing above mentioned ther-ex to improve ROM/flexibility. Thoracic mobilization/manipulation: prone P-A mob, supine A-P manip; Lumbar mobilization/manipulation: diversified side laying graded HVLA, extension-traction; SIJ Manipulation/Mobilization: L SIJ HVLA - long axis distraction, mack drop table maneuver to affected SIJ    HPI:  Krys Alex is a 49 y.o. female  Chief Complaint   Patient presents with    Back Pain     Middle back pain-4  Lower back pain-4    Neck Pain     Neck pain-4     Pt presents for tx fpor L sided neck to lower back pain after catching falling patient while bent and twisted to her right 4/26/24 while working for [employer]. Pt has undergone PT with modest benefit. Lumbar and Thoracic Spine MRIs 2024 demonstrate L4-L5: tiny right posterolateral annular fissure, small right foraminal disc protrusion. Mild facet arthropathy, trace facet joint effusions. Mild canal stenosis. Mild-to-moderate right and mild left foraminal narrowing L5-S1: Mild diffuse disc bulge, tiny right posterolateral annular fissure. Pt was seen by Pain Mgmt who referred here for consutl/tx  12/13: Pt reports symptoms settled after last tx    Back Pain  This is a new problem. The current episode started more than 1 month ago. The problem occurs  constantly. The problem has been waxing and waning since onset. The pain is present in the gluteal, lumbar spine, sacro-iliac and thoracic spine. The quality of the pain is described as aching. The pain does not radiate. The pain is Worse during the day. The symptoms are aggravated by twisting and bending (transitional mvmts, prolonged walking/standing, uphill/downhill, cobra stretch; palliative includes rest, massage, traction, laying supine). Pertinent negatives include no bladder incontinence or bowel incontinence.   Shoulder Pain     Neck Pain       Past Medical History:   Diagnosis Date    COVID-19 05/2022    Finger fracture, left     Intrinsic muscle tightness 01/16/2019    Pure hypercholesterolemia 10/14/2021    Trigger middle finger of left hand 01/16/2019    UTI symptoms 10/14/2021    Vitamin D deficiency       Past Surgical History:   Procedure Laterality Date    LAPAROSCOPIC OVARIAN CYSTECTOMY Right 2001    Lysis of adhesions, Dr. Miranda    OVARIAN CYST REMOVAL Right 1993    open, Dr. Arce    TONSILECTOMY AND ADNOIDECTOMY      TONSILLECTOMY      WISDOM TOOTH EXTRACTION       The following portions of the patient's history were reviewed and updated as appropriate: allergies, past family history, past medical history, past social history, past surgical history, and problem list.  Review of Systems   Gastrointestinal:  Negative for bowel incontinence.   Genitourinary:  Negative for bladder incontinence.   Musculoskeletal:  Positive for back pain and neck pain.     Physical Exam  Musculoskeletal:      Thoracic back: Spasms and tenderness present. Decreased range of motion.      Lumbar back: Spasms and tenderness present. Decreased range of motion. Negative right straight leg raise test and negative left straight leg raise test.        Back:       Comments: Pnful and limited in Rrot 10, Blf 15(contralaterally), Ext 15, Ext/Brot   Skin:     General: Skin is warm and dry.   Neurological:      Mental Status: She is  alert and oriented to person, place, and time.      Gait: Gait is intact.   Psychiatric:         Mood and Affect: Mood normal.         Behavior: Behavior normal.       SOFT TISSUE ASSESSMENT Hypertonicity and tenderness palpated L T 3-7, 10-S1 erector spinae, L hip flexor, L glute med/min, L QL, L hamstring JOINT RESTRICTIONS: T3-7, 10-S1, L R6, L R9, and L SIJ     Return in about 1 week (around 12/19/2024) for Next scheduled follow up.

## 2024-12-17 ENCOUNTER — PROCEDURE VISIT (OUTPATIENT)
Age: 49
End: 2024-12-17
Payer: COMMERCIAL

## 2024-12-17 VITALS
BODY MASS INDEX: 24.11 KG/M2 | HEART RATE: 88 BPM | DIASTOLIC BLOOD PRESSURE: 76 MMHG | SYSTOLIC BLOOD PRESSURE: 122 MMHG | HEIGHT: 66 IN | OXYGEN SATURATION: 99 % | WEIGHT: 150 LBS

## 2024-12-17 DIAGNOSIS — M79.18 MYOFASCIAL PAIN SYNDROME: ICD-10-CM

## 2024-12-17 DIAGNOSIS — M99.02 SEGMENTAL DYSFUNCTION OF THORACIC REGION: ICD-10-CM

## 2024-12-17 DIAGNOSIS — M99.04 SEGMENTAL DYSFUNCTION OF SACRAL REGION: ICD-10-CM

## 2024-12-17 DIAGNOSIS — M24.552 HIP FLEXOR TENDON TIGHTNESS, LEFT: ICD-10-CM

## 2024-12-17 DIAGNOSIS — M46.1 SACROILIITIS (HCC): Primary | ICD-10-CM

## 2024-12-17 DIAGNOSIS — M99.08 SOMATIC DYSFUNCTION OF COSTOVERTEBRAL JOINT STRUCTURE: ICD-10-CM

## 2024-12-17 PROCEDURE — 98941 CHIROPRACT MANJ 3-4 REGIONS: CPT | Performed by: CHIROPRACTOR

## 2024-12-17 PROCEDURE — 97110 THERAPEUTIC EXERCISES: CPT | Performed by: CHIROPRACTOR

## 2024-12-17 NOTE — PROGRESS NOTES
Initial date of service: 7/10/24    Diagnoses and all orders for this visit:    Sacroiliitis (HCC)    Segmental dysfunction of sacral region    Hip flexor tendon tightness, left    Somatic dysfunction of costovertebral joint structure    Myofascial pain syndrome    Segmental dysfunction of thoracic region    Pt responded well to tx with reduced pain and increased ROM    TREATMENT: 35154, 77577  Ther-ex: IASTM; discussed post procedure soreness and/or ecchymosis for up to 36 hrs, applied to affected mm hypertonicities; cobra stretch, standing extensions, hip flexor pin-and-stretch, transitional mvmt education, abdominal bracing; greater than 15 min spent performing above mentioned ther-ex to improve ROM/flexibility. Thoracic mobilization/manipulation: prone P-A mob, supine A-P manip; Lumbar mobilization/manipulation: diversified side laying graded HVLA, extension-traction; SIJ Manipulation/Mobilization: L SIJ HVLA - long axis distraction, mack drop table maneuver to affected SIJ    HPI:  Krys Alex is a 49 y.o. female  Chief Complaint   Patient presents with    Back Pain     Middle back pain-5  Lower back pain-5    Neck Pain     Neck pain-5     Pt presents for tx fpor L sided neck to lower back pain after catching falling patient while bent and twisted to her right 4/26/24 while working for [employer]. Pt has undergone PT with modest benefit. Lumbar and Thoracic Spine MRIs 2024 demonstrate L4-L5: tiny right posterolateral annular fissure, small right foraminal disc protrusion. Mild facet arthropathy, trace facet joint effusions. Mild canal stenosis. Mild-to-moderate right and mild left foraminal narrowing L5-S1: Mild diffuse disc bulge, tiny right posterolateral annular fissure. Pt was seen by Pain Mgmt who referred here for consutl/tx  12/17: Pt reports another flare-up from weekend shift    Back Pain  This is a new problem. The current episode started more than 1 month ago. The problem occurs  constantly. The problem has been waxing and waning since onset. The pain is present in the gluteal, lumbar spine, sacro-iliac and thoracic spine. The quality of the pain is described as aching. The pain does not radiate. The pain is Worse during the day. The symptoms are aggravated by twisting and bending (transitional mvmts, prolonged walking/standing, uphill/downhill, cobra stretch; palliative includes rest, massage, traction, laying supine). Pertinent negatives include no bladder incontinence or bowel incontinence.   Shoulder Pain     Neck Pain       Past Medical History:   Diagnosis Date    COVID-19 05/2022    Finger fracture, left     Intrinsic muscle tightness 01/16/2019    Pure hypercholesterolemia 10/14/2021    Trigger middle finger of left hand 01/16/2019    UTI symptoms 10/14/2021    Vitamin D deficiency       Past Surgical History:   Procedure Laterality Date    LAPAROSCOPIC OVARIAN CYSTECTOMY Right 2001    Lysis of adhesions, Dr. Miranda    OVARIAN CYST REMOVAL Right 1993    open, Dr. Arce    TONSILECTOMY AND ADNOIDECTOMY      TONSILLECTOMY      WISDOM TOOTH EXTRACTION       The following portions of the patient's history were reviewed and updated as appropriate: allergies, past family history, past medical history, past social history, past surgical history, and problem list.  Review of Systems   Gastrointestinal:  Negative for bowel incontinence.   Genitourinary:  Negative for bladder incontinence.   Musculoskeletal:  Positive for back pain and neck pain.     Physical Exam  Musculoskeletal:      Thoracic back: Spasms and tenderness present. Decreased range of motion.      Lumbar back: Spasms and tenderness present. Decreased range of motion. Negative right straight leg raise test and negative left straight leg raise test.        Back:       Comments: Pnful and limited in Rrot 10, Blf 15(contralaterally), Ext 15, Ext/Brot   Skin:     General: Skin is warm and dry.   Neurological:      Mental Status: She is  alert and oriented to person, place, and time.      Gait: Gait is intact.   Psychiatric:         Mood and Affect: Mood normal.         Behavior: Behavior normal.       SOFT TISSUE ASSESSMENT Hypertonicity and tenderness palpated L T 3-7, 10-S1 erector spinae, L hip flexor, L glute med/min, L QL, L hamstring JOINT RESTRICTIONS: T3-7, 10-S1, L R6, L R9, and L SIJ     Return in about 2 weeks (around 12/31/2024) for Next scheduled follow up.

## 2024-12-23 ENCOUNTER — PROCEDURE VISIT (OUTPATIENT)
Age: 49
End: 2024-12-23
Payer: COMMERCIAL

## 2024-12-23 VITALS — HEIGHT: 66 IN | WEIGHT: 150 LBS | BODY MASS INDEX: 24.11 KG/M2

## 2024-12-23 DIAGNOSIS — M99.08 SOMATIC DYSFUNCTION OF COSTOVERTEBRAL JOINT STRUCTURE: ICD-10-CM

## 2024-12-23 DIAGNOSIS — M46.1 SACROILIITIS (HCC): Primary | ICD-10-CM

## 2024-12-23 DIAGNOSIS — M99.02 SEGMENTAL DYSFUNCTION OF THORACIC REGION: ICD-10-CM

## 2024-12-23 DIAGNOSIS — M24.552 HIP FLEXOR TENDON TIGHTNESS, LEFT: ICD-10-CM

## 2024-12-23 DIAGNOSIS — M79.18 MYOFASCIAL PAIN SYNDROME: ICD-10-CM

## 2024-12-23 DIAGNOSIS — M99.04 SEGMENTAL DYSFUNCTION OF SACRAL REGION: ICD-10-CM

## 2024-12-23 PROCEDURE — 97110 THERAPEUTIC EXERCISES: CPT | Performed by: CHIROPRACTOR

## 2024-12-23 PROCEDURE — 98941 CHIROPRACT MANJ 3-4 REGIONS: CPT | Performed by: CHIROPRACTOR

## 2024-12-23 NOTE — PROGRESS NOTES
Initial date of service: 7/10/24    Diagnoses and all orders for this visit:    Sacroiliitis (HCC)    Segmental dysfunction of sacral region    Hip flexor tendon tightness, left    Somatic dysfunction of costovertebral joint structure    Myofascial pain syndrome    Segmental dysfunction of thoracic region    Pt responded well to tx with reduced pain and increased ROM    TREATMENT: 53015, [id number]  Ther-ex: IASTM; discussed post procedure soreness and/or ecchymosis for up to 36 hrs, applied to affected mm hypertonicities; cobra stretch, standing extensions, hip flexor pin-and-stretch, transitional mvmt education, abdominal bracing; greater than 15 min spent performing above mentioned ther-ex to improve ROM/flexibility. Thoracic mobilization/manipulation: prone P-A mob, supine A-P manip; Lumbar mobilization/manipulation: diversified side laying graded HVLA, extension-traction; SIJ Manipulation/Mobilization: L SIJ HVLA - long axis distraction, mack drop table maneuver to affected SIJ    HPI:  Krys Alex is a 49 y.o. female  Chief Complaint   Patient presents with    Neck - Follow-up     Neck pain is shap pain. Pain score 7    Back Pain     Lower lumbar pain score 7     Pt presents for tx fpor L sided neck to lower back pain after catching falling patient while bent and twisted to her right 4/26/24 while working for [employer]. Pt has undergone PT with modest benefit. Lumbar and Thoracic Spine MRIs 2024 demonstrate L4-L5: tiny right posterolateral annular fissure, small right foraminal disc protrusion. Mild facet arthropathy, trace facet joint effusions. Mild canal stenosis. Mild-to-moderate right and mild left foraminal narrowing L5-S1: Mild diffuse disc bulge, tiny right posterolateral annular fissure. Pt was seen by Pain Mgmt who referred here for consutl/tx  12/23: Pt reports flare-up from weekend in [facility]    Back Pain  This is a new problem. The current episode started more than 1 month ago. The  problem occurs constantly. The problem has been waxing and waning since onset. The pain is present in the gluteal, lumbar spine, sacro-iliac and thoracic spine. The quality of the pain is described as aching. The pain does not radiate. The pain is Worse during the day. The symptoms are aggravated by twisting and bending (transitional mvmts, prolonged walking/standing, uphill/downhill, cobra stretch; palliative includes rest, massage, traction, laying supine). Pertinent negatives include no bladder incontinence or bowel incontinence.   Shoulder Pain     Neck Pain       Past Medical History:   Diagnosis Date    COVID-19 05/2022    Finger fracture, left     Intrinsic muscle tightness 01/16/2019    Pure hypercholesterolemia 10/14/2021    Trigger middle finger of left hand 01/16/2019    UTI symptoms 10/14/2021    Vitamin D deficiency       Past Surgical History:   Procedure Laterality Date    LAPAROSCOPIC OVARIAN CYSTECTOMY Right 2001    Lysis of adhesions, Dr. Miranda    OVARIAN CYST REMOVAL Right 1993    open, Dr. Arce    TONSILECTOMY AND ADNOIDECTOMY      TONSILLECTOMY      WISDOM TOOTH EXTRACTION       The following portions of the patient's history were reviewed and updated as appropriate: allergies, past family history, past medical history, past social history, past surgical history, and problem list.  Review of Systems   Gastrointestinal:  Negative for bowel incontinence.   Genitourinary:  Negative for bladder incontinence.   Musculoskeletal:  Positive for back pain and neck pain.     Physical Exam  Musculoskeletal:      Thoracic back: Spasms and tenderness present. Decreased range of motion.      Lumbar back: Spasms and tenderness present. Decreased range of motion. Negative right straight leg raise test and negative left straight leg raise test.        Back:       Comments: Pnful and limited in Rrot 10, Blf 15(contralaterally), Ext 15, Ext/Brot   Skin:     General: Skin is warm and dry.   Neurological:      Mental  Status: She is alert and oriented to person, place, and time.      Gait: Gait is intact.   Psychiatric:         Mood and Affect: Mood normal.         Behavior: Behavior normal.       SOFT TISSUE ASSESSMENT Hypertonicity and tenderness palpated L T 3-7, 10-S1 erector spinae, L hip flexor, L glute med/min, L QL, L hamstring JOINT RESTRICTIONS: T3-7, 10-S1, L R6, L R9, and L SIJ     Return in about 2 weeks (around 1/6/2025) for Next scheduled follow up.

## 2024-12-25 LAB — COLOGUARD RESULT REPORTABLE: POSITIVE

## 2024-12-26 ENCOUNTER — RESULTS FOLLOW-UP (OUTPATIENT)
Dept: OTHER | Facility: HOSPITAL | Age: 49
End: 2024-12-26

## 2024-12-26 DIAGNOSIS — Z12.11 COLON CANCER SCREENING: Primary | ICD-10-CM

## 2024-12-26 NOTE — TELEPHONE ENCOUNTER
Patient returned call, reviewed results and recommendations. Patient states Dr Belcher mentioned a GI physician that she really likes and patient states she would like to go to that same physician. Is asking for his name so that she can schedule.     Please advise if you can place a referral to this specific GI physician that patient would like to see.

## 2024-12-26 NOTE — TELEPHONE ENCOUNTER
Called patient and informed of response and recommendations. Verbalized understanding. No further questions.

## 2025-01-09 ENCOUNTER — PROCEDURE VISIT (OUTPATIENT)
Age: 50
End: 2025-01-09
Payer: COMMERCIAL

## 2025-01-09 VITALS — HEIGHT: 66 IN | WEIGHT: 150 LBS | BODY MASS INDEX: 24.11 KG/M2

## 2025-01-09 DIAGNOSIS — M99.02 SEGMENTAL DYSFUNCTION OF THORACIC REGION: ICD-10-CM

## 2025-01-09 DIAGNOSIS — M51.360 DEGENERATION OF INTERVERTEBRAL DISC OF LUMBAR REGION WITH DISCOGENIC BACK PAIN: ICD-10-CM

## 2025-01-09 DIAGNOSIS — M99.08 SOMATIC DYSFUNCTION OF COSTOVERTEBRAL JOINT STRUCTURE: ICD-10-CM

## 2025-01-09 DIAGNOSIS — M99.04 SEGMENTAL DYSFUNCTION OF SACRAL REGION: ICD-10-CM

## 2025-01-09 DIAGNOSIS — M46.1 SACROILIITIS (HCC): Primary | ICD-10-CM

## 2025-01-09 DIAGNOSIS — M24.552 HIP FLEXOR TENDON TIGHTNESS, LEFT: ICD-10-CM

## 2025-01-09 DIAGNOSIS — M79.18 MYOFASCIAL PAIN SYNDROME: ICD-10-CM

## 2025-01-09 PROCEDURE — 98941 CHIROPRACT MANJ 3-4 REGIONS: CPT | Performed by: CHIROPRACTOR

## 2025-01-09 PROCEDURE — 97110 THERAPEUTIC EXERCISES: CPT | Performed by: CHIROPRACTOR

## 2025-01-10 NOTE — PROGRESS NOTES
Initial date of service: 7/10/24    Diagnoses and all orders for this visit:    Sacroiliitis (HCC)    Segmental dysfunction of sacral region    Hip flexor tendon tightness, left    Somatic dysfunction of costovertebral joint structure    Myofascial pain syndrome    Segmental dysfunction of thoracic region    Degeneration of intervertebral disc of lumbar region with discogenic back pain    Pt suffered exacerbation but responded well to tx with reduced pain and increased ROM    TREATMENT: [id number], [id number]  Ther-ex: IASTM; discussed post procedure soreness and/or ecchymosis for up to 36 hrs, applied to affected mm hypertonicities; cobra stretch, standing extensions, hip flexor pin-and-stretch, transitional mvmt education, abdominal bracing; greater than 15 min spent performing above mentioned ther-ex to improve ROM/flexibility. Thoracic mobilization/manipulation: prone P-A mob, supine A-P manip; Lumbar mobilization/manipulation: diversified side laying graded HVLA, extension-traction; SIJ Manipulation/Mobilization: L SIJ HVLA - long axis distraction, mack drop table maneuver to affected SIJ    HPI:  Krys Alex is a 49 y.o. female  Chief Complaint   Patient presents with    Back Pain     Has not been a big concern     Neck Pain     Neck up to skull pain. Pain score 8     Pt presents for tx fpor L sided neck to lower back pain after catching falling patient while bent and twisted to her right 4/26/24 while working for [employer]. Pt has undergone PT with modest benefit. Lumbar and Thoracic Spine MRIs 2024 demonstrate L4-L5: tiny right posterolateral annular fissure, small right foraminal disc protrusion. Mild facet arthropathy, trace facet joint effusions. Mild canal stenosis. Mild-to-moderate right and mild left foraminal narrowing L5-S1: Mild diffuse disc bulge, tiny right posterolateral annular fissure. Pt was seen by Pain Mgmt who referred here for consutl/tx  1/10: Pt reports feeling and moving  "better after last tx but suffered flare-up; \"too long between appointments\"    Back Pain  This is a new problem. The current episode started more than 1 month ago. The problem occurs constantly. The problem has been waxing and waning since onset. The pain is present in the gluteal, lumbar spine, sacro-iliac and thoracic spine. The quality of the pain is described as aching. The pain does not radiate. The pain is Worse during the day. The symptoms are aggravated by twisting and bending (transitional mvmts, prolonged walking/standing, uphill/downhill, cobra stretch; palliative includes rest, massage, traction, laying supine). Pertinent negatives include no bladder incontinence or bowel incontinence.   Shoulder Pain     Neck Pain       Past Medical History:   Diagnosis Date    COVID-19 05/2022    Finger fracture, left     Intrinsic muscle tightness 01/16/2019    Pure hypercholesterolemia 10/14/2021    Trigger middle finger of left hand 01/16/2019    UTI symptoms 10/14/2021    Vitamin D deficiency       Past Surgical History:   Procedure Laterality Date    LAPAROSCOPIC OVARIAN CYSTECTOMY Right 2001    Lysis of adhesions, Dr. Miranda    OVARIAN CYST REMOVAL Right 1993    open, Dr. Arce    TONSILECTOMY AND ADNOIDECTOMY      TONSILLECTOMY      WISDOM TOOTH EXTRACTION       The following portions of the patient's history were reviewed and updated as appropriate: allergies, past family history, past medical history, past social history, past surgical history, and problem list.  Review of Systems   Gastrointestinal:  Negative for bowel incontinence.   Genitourinary:  Negative for bladder incontinence.   Musculoskeletal:  Positive for back pain and neck pain.     Physical Exam  Musculoskeletal:      Thoracic back: Spasms and tenderness present. Decreased range of motion.      Lumbar back: Spasms and tenderness present. Decreased range of motion. Negative right straight leg raise test and negative left straight leg raise test.       "  Back:       Comments: Pnful and limited in Rrot 10, Blf 15(contralaterally), Ext 15, Ext/Brot   Skin:     General: Skin is warm and dry.   Neurological:      Mental Status: She is alert and oriented to person, place, and time.      Gait: Gait is intact.   Psychiatric:         Mood and Affect: Mood normal.         Behavior: Behavior normal.       SOFT TISSUE ASSESSMENT Hypertonicity and tenderness palpated L T 3-7, 10-S1 erector spinae, L hip flexor, L glute med/min, L QL, L hamstring JOINT RESTRICTIONS: T3-7, 10-S1, L R6, L R9, and L SIJ     Return in about 1 week (around 1/16/2025) for Next scheduled follow up.

## 2025-01-15 ENCOUNTER — PROCEDURE VISIT (OUTPATIENT)
Age: 50
End: 2025-01-15
Payer: COMMERCIAL

## 2025-01-15 VITALS — BODY MASS INDEX: 24.11 KG/M2 | HEIGHT: 66 IN | WEIGHT: 150 LBS

## 2025-01-15 DIAGNOSIS — M51.360 DEGENERATION OF INTERVERTEBRAL DISC OF LUMBAR REGION WITH DISCOGENIC BACK PAIN: ICD-10-CM

## 2025-01-15 DIAGNOSIS — M99.04 SEGMENTAL DYSFUNCTION OF SACRAL REGION: ICD-10-CM

## 2025-01-15 DIAGNOSIS — M79.18 MYOFASCIAL PAIN SYNDROME: ICD-10-CM

## 2025-01-15 DIAGNOSIS — M46.1 SACROILIITIS (HCC): Primary | ICD-10-CM

## 2025-01-15 DIAGNOSIS — M99.02 SEGMENTAL DYSFUNCTION OF THORACIC REGION: ICD-10-CM

## 2025-01-15 DIAGNOSIS — M24.552 HIP FLEXOR TENDON TIGHTNESS, LEFT: ICD-10-CM

## 2025-01-15 DIAGNOSIS — M99.08 SOMATIC DYSFUNCTION OF COSTOVERTEBRAL JOINT STRUCTURE: ICD-10-CM

## 2025-01-15 PROCEDURE — 98941 CHIROPRACT MANJ 3-4 REGIONS: CPT | Performed by: CHIROPRACTOR

## 2025-01-15 PROCEDURE — 97110 THERAPEUTIC EXERCISES: CPT | Performed by: CHIROPRACTOR

## 2025-01-16 NOTE — PROGRESS NOTES
Initial date of service: 7/10/24    Diagnoses and all orders for this visit:    Sacroiliitis (HCC)    Segmental dysfunction of sacral region    Hip flexor tendon tightness, left    Somatic dysfunction of costovertebral joint structure    Myofascial pain syndrome    Segmental dysfunction of thoracic region    Degeneration of intervertebral disc of lumbar region with discogenic back pain    Pt suffered exacerbation but responded well to tx with reduced pain and increased ROM; discussed referral for injection consult again but pt declined    TREATMENT: 00120, [id number]  Ther-ex: IASTM; discussed post procedure soreness and/or ecchymosis for up to 36 hrs, applied to affected mm hypertonicities; cobra stretch, standing extensions, hip flexor pin-and-stretch, transitional mvmt education, abdominal bracing; greater than 15 min spent performing above mentioned ther-ex to improve ROM/flexibility. Thoracic mobilization/manipulation: prone P-A mob, supine A-P manip; Lumbar mobilization/manipulation: diversified side laying graded HVLA, extension-traction; SIJ Manipulation/Mobilization: L SIJ HVLA - long axis distraction, mack drop table maneuver to affected SIJ    HPI:  Krys Alex is a 49 y.o. female  Chief Complaint   Patient presents with    Neck - Follow-up     Neck pain score 8-9    Patient states constant pain     Back Pain     Lower lumbar pain score 6-7          Pt presents for tx fpor L sided neck to lower back pain after catching falling patient while bent and twisted to her right 4/26/24 while working for [employer]. Pt has undergone PT with modest benefit. Lumbar and Thoracic Spine MRIs 2024 demonstrate L4-L5: tiny right posterolateral annular fissure, small right foraminal disc protrusion. Mild facet arthropathy, trace facet joint effusions. Mild canal stenosis. Mild-to-moderate right and mild left foraminal narrowing L5-S1: Mild diffuse disc bulge, tiny right posterolateral annular fissure. Pt  was seen by Pain Mgmt who referred here for consutl/tx  1/15: Pt reports feeling and moving better after last tx but suffered flare-up    Back Pain  This is a new problem. The current episode started more than 1 month ago. The problem occurs constantly. The problem has been waxing and waning since onset. The pain is present in the gluteal, lumbar spine, sacro-iliac and thoracic spine. The quality of the pain is described as aching. The pain does not radiate. The pain is Worse during the day. The symptoms are aggravated by twisting and bending (transitional mvmts, prolonged walking/standing, uphill/downhill, cobra stretch; palliative includes rest, massage, traction, laying supine). Pertinent negatives include no bladder incontinence or bowel incontinence.   Shoulder Pain     Neck Pain       Past Medical History:   Diagnosis Date    COVID-19 05/2022    Finger fracture, left     Intrinsic muscle tightness 01/16/2019    Pure hypercholesterolemia 10/14/2021    Trigger middle finger of left hand 01/16/2019    UTI symptoms 10/14/2021    Vitamin D deficiency       Past Surgical History:   Procedure Laterality Date    LAPAROSCOPIC OVARIAN CYSTECTOMY Right 2001    Lysis of adhesions, Dr. Miranda    OVARIAN CYST REMOVAL Right 1993    open, Dr. Arce    TONSILECTOMY AND ADNOIDECTOMY      TONSILLECTOMY      WISDOM TOOTH EXTRACTION       The following portions of the patient's history were reviewed and updated as appropriate: allergies, past family history, past medical history, past social history, past surgical history, and problem list.  Review of Systems   Gastrointestinal:  Negative for bowel incontinence.   Genitourinary:  Negative for bladder incontinence.   Musculoskeletal:  Positive for back pain and neck pain.     Physical Exam  Musculoskeletal:      Thoracic back: Spasms and tenderness present. Decreased range of motion.      Lumbar back: Spasms and tenderness present. Decreased range of motion. Negative right straight leg  raise test and negative left straight leg raise test.        Back:       Comments: Pnful and limited in Rrot 10, Blf 15(contralaterally), Ext 15, Ext/Brot   Skin:     General: Skin is warm and dry.   Neurological:      Mental Status: She is alert and oriented to person, place, and time.      Gait: Gait is intact.   Psychiatric:         Mood and Affect: Mood normal.         Behavior: Behavior normal.       SOFT TISSUE ASSESSMENT Hypertonicity and tenderness palpated L T 3-7, 10-S1 erector spinae, L hip flexor, L glute med/min, L QL, L hamstring JOINT RESTRICTIONS: T3-7, 10-S1, L R6, L R9, and L SIJ     Return in about 2 weeks (around 1/29/2025) for Next scheduled follow up.

## 2025-01-21 ENCOUNTER — PROCEDURE VISIT (OUTPATIENT)
Age: 50
End: 2025-01-21
Payer: COMMERCIAL

## 2025-01-21 VITALS — WEIGHT: 150 LBS | BODY MASS INDEX: 24.11 KG/M2 | HEIGHT: 66 IN

## 2025-01-21 DIAGNOSIS — M99.08 SOMATIC DYSFUNCTION OF COSTOVERTEBRAL JOINT STRUCTURE: ICD-10-CM

## 2025-01-21 DIAGNOSIS — M79.18 MYOFASCIAL PAIN SYNDROME: ICD-10-CM

## 2025-01-21 DIAGNOSIS — M99.04 SEGMENTAL DYSFUNCTION OF SACRAL REGION: ICD-10-CM

## 2025-01-21 DIAGNOSIS — M46.1 SACROILIITIS (HCC): Primary | ICD-10-CM

## 2025-01-21 DIAGNOSIS — M24.552 HIP FLEXOR TENDON TIGHTNESS, LEFT: ICD-10-CM

## 2025-01-21 DIAGNOSIS — M99.02 SEGMENTAL DYSFUNCTION OF THORACIC REGION: ICD-10-CM

## 2025-01-21 PROCEDURE — 98941 CHIROPRACT MANJ 3-4 REGIONS: CPT | Performed by: CHIROPRACTOR

## 2025-01-21 PROCEDURE — 97110 THERAPEUTIC EXERCISES: CPT | Performed by: CHIROPRACTOR

## 2025-01-23 NOTE — PROGRESS NOTES
Initial date of service: 7/10/24    Diagnoses and all orders for this visit:    Sacroiliitis (HCC)    Segmental dysfunction of sacral region    Hip flexor tendon tightness, left    Somatic dysfunction of costovertebral joint structure    Myofascial pain syndrome    Segmental dysfunction of thoracic region    Pt suffered exacerbation but responded well to tx with reduced pain and increased ROM; discussed referral for injection consult again but pt declined    TREATMENT: [id number], [id number]  Ther-ex: IASTM; discussed post procedure soreness and/or ecchymosis for up to 36 hrs, applied to affected mm hypertonicities; cobra stretch, standing extensions, hip flexor pin-and-stretch, transitional mvmt education, abdominal bracing; greater than 15 min spent performing above mentioned ther-ex to improve ROM/flexibility. Thoracic mobilization/manipulation: prone P-A mob, supine A-P manip; Lumbar mobilization/manipulation: diversified side laying graded HVLA, extension-traction; SIJ Manipulation/Mobilization: L SIJ HVLA - long axis distraction, mack drop table maneuver to affected SIJ    HPI:  Krys Alex is a 49 y.o. female  Chief Complaint   Patient presents with    Back Pain     Pain score 6    Neck Pain     Pain score 6     Pt presents for tx fpor L sided neck to lower back pain after catching falling patient while bent and twisted to her right 4/26/24 while working for [employer]. Pt has undergone PT with modest benefit. Lumbar and Thoracic Spine MRIs 2024 demonstrate L4-L5: tiny right posterolateral annular fissure, small right foraminal disc protrusion. Mild facet arthropathy, trace facet joint effusions. Mild canal stenosis. Mild-to-moderate right and mild left foraminal narrowing L5-S1: Mild diffuse disc bulge, tiny right posterolateral annular fissure. Pt was seen by Pain Mgmt who referred here for consutl/tx  1/21: Pt reports feeling and moving better after last tx but suffered flare-up    Back  Pain  This is a new problem. The current episode started more than 1 month ago. The problem occurs constantly. The problem has been waxing and waning since onset. The pain is present in the gluteal, lumbar spine, sacro-iliac and thoracic spine. The quality of the pain is described as aching. The pain does not radiate. The pain is Worse during the day. The symptoms are aggravated by twisting and bending (transitional mvmts, prolonged walking/standing, uphill/downhill, cobra stretch; palliative includes rest, massage, traction, laying supine). Pertinent negatives include no bladder incontinence or bowel incontinence.   Shoulder Pain     Neck Pain       Past Medical History:   Diagnosis Date    COVID-19 05/2022    Finger fracture, left     Intrinsic muscle tightness 01/16/2019    Pure hypercholesterolemia 10/14/2021    Trigger middle finger of left hand 01/16/2019    UTI symptoms 10/14/2021    Vitamin D deficiency       Past Surgical History:   Procedure Laterality Date    LAPAROSCOPIC OVARIAN CYSTECTOMY Right 2001    Lysis of adhesions, Dr. Miranda    OVARIAN CYST REMOVAL Right 1993    open, Dr. Arce    TONSILECTOMY AND ADNOIDECTOMY      TONSILLECTOMY      WISDOM TOOTH EXTRACTION       The following portions of the patient's history were reviewed and updated as appropriate: allergies, past family history, past medical history, past social history, past surgical history, and problem list.  Review of Systems   Gastrointestinal:  Negative for bowel incontinence.   Genitourinary:  Negative for bladder incontinence.   Musculoskeletal:  Positive for back pain and neck pain.     Physical Exam  Musculoskeletal:      Thoracic back: Spasms and tenderness present. Decreased range of motion.      Lumbar back: Spasms and tenderness present. Decreased range of motion. Negative right straight leg raise test and negative left straight leg raise test.        Back:       Comments: Pnful and limited in Rrot 10, Blf 15(contralaterally), Ext  15, Ext/Brot   Skin:     General: Skin is warm and dry.   Neurological:      Mental Status: She is alert and oriented to person, place, and time.      Gait: Gait is intact.   Psychiatric:         Mood and Affect: Mood normal.         Behavior: Behavior normal.       SOFT TISSUE ASSESSMENT Hypertonicity and tenderness palpated L T 3-7, 10-S1 erector spinae, L hip flexor, L glute med/min, L QL, L hamstring JOINT RESTRICTIONS: T3-7, 10-S1, L R6, L R9, and L SIJ     Return in about 1 week (around 1/28/2025) for Next scheduled follow up.

## 2025-01-28 ENCOUNTER — PROCEDURE VISIT (OUTPATIENT)
Age: 50
End: 2025-01-28
Payer: COMMERCIAL

## 2025-01-28 VITALS — HEIGHT: 66 IN | WEIGHT: 150 LBS | BODY MASS INDEX: 24.11 KG/M2

## 2025-01-28 DIAGNOSIS — M46.1 SACROILIITIS (HCC): Primary | ICD-10-CM

## 2025-01-28 DIAGNOSIS — M24.552 HIP FLEXOR TENDON TIGHTNESS, LEFT: ICD-10-CM

## 2025-01-28 DIAGNOSIS — M99.04 SEGMENTAL DYSFUNCTION OF SACRAL REGION: ICD-10-CM

## 2025-01-28 DIAGNOSIS — M99.08 SOMATIC DYSFUNCTION OF COSTOVERTEBRAL JOINT STRUCTURE: ICD-10-CM

## 2025-01-28 DIAGNOSIS — M99.02 SEGMENTAL DYSFUNCTION OF THORACIC REGION: ICD-10-CM

## 2025-01-28 DIAGNOSIS — M75.81 ROTATOR CUFF TENDONITIS, RIGHT: ICD-10-CM

## 2025-01-28 DIAGNOSIS — M79.18 MYOFASCIAL PAIN SYNDROME: ICD-10-CM

## 2025-01-28 PROCEDURE — 98941 CHIROPRACT MANJ 3-4 REGIONS: CPT | Performed by: CHIROPRACTOR

## 2025-01-28 PROCEDURE — 97110 THERAPEUTIC EXERCISES: CPT | Performed by: CHIROPRACTOR

## 2025-01-28 NOTE — PROGRESS NOTES
Initial date of service: 7/10/24    Diagnoses and all orders for this visit:    Sacroiliitis (HCC)    Segmental dysfunction of sacral region    Hip flexor tendon tightness, left    Somatic dysfunction of costovertebral joint structure    Myofascial pain syndrome    Segmental dysfunction of thoracic region    Rotator cuff tendonitis, right    Pt suffered exacerbation but responded well to tx with reduced pain and increased ROM; discussed referral for injection consult again but pt declined    TREATMENT: [id number], [id number]  Ther-ex: IASTM; discussed post procedure soreness and/or ecchymosis for up to 36 hrs, applied to affected mm hypertonicities; cobra stretch, standing extensions, hip flexor pin-and-stretch, transitional mvmt education, abdominal bracing; greater than 15 min spent performing above mentioned ther-ex to improve ROM/flexibility. Thoracic mobilization/manipulation: prone P-A mob, supine A-P manip; Lumbar mobilization/manipulation: diversified side laying graded HVLA, extension-traction; SIJ Manipulation/Mobilization: L SIJ HVLA - long axis distraction, mack drop table maneuver to affected SIJ    HPI:  Krys Alex is a 49 y.o. female  Chief Complaint   Patient presents with    Back Pain     Middle back pain-6  Lower back pain-6    Neck Pain     Neck pain-6    Shoulder Pain     Right shoulder pain-10     Pt presents for tx fpor L sided neck to lower back pain after catching falling patient while bent and twisted to her right 4/26/24 while working for [employer]. Pt has undergone PT with modest benefit. Lumbar and Thoracic Spine MRIs 2024 demonstrate L4-L5: tiny right posterolateral annular fissure, small right foraminal disc protrusion. Mild facet arthropathy, trace facet joint effusions. Mild canal stenosis. Mild-to-moderate right and mild left foraminal narrowing L5-S1: Mild diffuse disc bulge, tiny right posterolateral annular fissure. Pt was seen by Pain Mgmt who referred here for  consutl/tx  1/28: Pt reports feeling and moving better after last tx but suffered flare-up, particularly R shoulder after repetitive motions at work    Back Pain  This is a new problem. The current episode started more than 1 month ago. The problem occurs constantly. The problem has been waxing and waning since onset. The pain is present in the gluteal, lumbar spine, sacro-iliac and thoracic spine. The quality of the pain is described as aching. The pain does not radiate. The pain is Worse during the day. The symptoms are aggravated by twisting and bending (transitional mvmts, prolonged walking/standing, uphill/downhill, cobra stretch; palliative includes rest, massage, traction, laying supine). Pertinent negatives include no bladder incontinence or bowel incontinence.   Shoulder Pain     Neck Pain       Past Medical History:   Diagnosis Date    COVID-19 05/2022    Finger fracture, left     Intrinsic muscle tightness 01/16/2019    Pure hypercholesterolemia 10/14/2021    Trigger middle finger of left hand 01/16/2019    UTI symptoms 10/14/2021    Vitamin D deficiency       Past Surgical History:   Procedure Laterality Date    LAPAROSCOPIC OVARIAN CYSTECTOMY Right 2001    Lysis of adhesions, Dr. Miranda    OVARIAN CYST REMOVAL Right 1993    open, Dr. Arce    TONSILECTOMY AND ADNOIDECTOMY      TONSILLECTOMY      WISDOM TOOTH EXTRACTION       The following portions of the patient's history were reviewed and updated as appropriate: allergies, past family history, past medical history, past social history, past surgical history, and problem list.  Review of Systems   Gastrointestinal:  Negative for bowel incontinence.   Genitourinary:  Negative for bladder incontinence.   Musculoskeletal:  Positive for back pain and neck pain.     Physical Exam  Musculoskeletal:      Thoracic back: Spasms and tenderness present. Decreased range of motion.      Lumbar back: Spasms and tenderness present. Decreased range of motion. Negative right  straight leg raise test and negative left straight leg raise test.        Back:       Comments: Pnful and limited in Rrot 10, Blf 15(contralaterally), Ext 15, Ext/Brot   Skin:     General: Skin is warm and dry.   Neurological:      Mental Status: She is alert and oriented to person, place, and time.      Gait: Gait is intact.   Psychiatric:         Mood and Affect: Mood normal.         Behavior: Behavior normal.       SOFT TISSUE ASSESSMENT Hypertonicity and tenderness palpated L T 3-7, 10-S1 erector spinae, L hip flexor, L glute med/min, L QL, L hamstring JOINT RESTRICTIONS: T3-7, 10-S1, L R6, L R9, and L SIJ     Return in about 2 weeks (around 2/11/2025) for Next scheduled follow up.

## 2025-02-04 ENCOUNTER — PROCEDURE VISIT (OUTPATIENT)
Age: 50
End: 2025-02-04
Payer: COMMERCIAL

## 2025-02-04 VITALS — BODY MASS INDEX: 24.11 KG/M2 | HEIGHT: 66 IN | WEIGHT: 150 LBS

## 2025-02-04 DIAGNOSIS — M99.02 SEGMENTAL DYSFUNCTION OF THORACIC REGION: ICD-10-CM

## 2025-02-04 DIAGNOSIS — M99.04 SEGMENTAL DYSFUNCTION OF SACRAL REGION: ICD-10-CM

## 2025-02-04 DIAGNOSIS — M99.08 SOMATIC DYSFUNCTION OF COSTOVERTEBRAL JOINT STRUCTURE: ICD-10-CM

## 2025-02-04 DIAGNOSIS — M75.81 ROTATOR CUFF TENDONITIS, RIGHT: ICD-10-CM

## 2025-02-04 DIAGNOSIS — M79.18 MYOFASCIAL PAIN SYNDROME: ICD-10-CM

## 2025-02-04 DIAGNOSIS — M46.1 SACROILIITIS (HCC): Primary | ICD-10-CM

## 2025-02-04 DIAGNOSIS — M24.552 HIP FLEXOR TENDON TIGHTNESS, LEFT: ICD-10-CM

## 2025-02-04 PROCEDURE — 98941 CHIROPRACT MANJ 3-4 REGIONS: CPT | Performed by: CHIROPRACTOR

## 2025-02-04 PROCEDURE — 97110 THERAPEUTIC EXERCISES: CPT | Performed by: CHIROPRACTOR

## 2025-02-05 NOTE — PROGRESS NOTES
Initial date of service: 7/10/24    Diagnoses and all orders for this visit:    Sacroiliitis (HCC)    Segmental dysfunction of sacral region    Hip flexor tendon tightness, left    Somatic dysfunction of costovertebral joint structure    Myofascial pain syndrome    Segmental dysfunction of thoracic region    Rotator cuff tendonitis, right    Pt suffered exacerbation but responded well to tx with reduced pain and increased ROM; discussed referral for injection consult again but pt declined    TREATMENT: 47058, [id number]  Ther-ex: IASTM; discussed post procedure soreness and/or ecchymosis for up to 36 hrs, applied to affected mm hypertonicities; cobra stretch, standing extensions, hip flexor pin-and-stretch, transitional mvmt education, abdominal bracing; greater than 15 min spent performing above mentioned ther-ex to improve ROM/flexibility. Thoracic mobilization/manipulation: prone P-A mob, supine A-P manip; Lumbar mobilization/manipulation: diversified side laying graded HVLA, extension-traction; SIJ Manipulation/Mobilization: L SIJ HVLA - long axis distraction, mack drop table maneuver to affected SIJ    HPI:  Krys Alex is a 49 y.o. female  Chief Complaint   Patient presents with    Back Pain     Back pain.  Pain score 4 in thoracic area, low back/left hip pain score 8.    Neck Pain     Neck pain. Pain score 5    Shoulder Pain     Right shoulder pain.  Pain score 8-9     Pt presents for tx fpor L sided neck to lower back pain after catching falling patient while bent and twisted to her right 4/26/24 while working for [employer]. Pt has undergone PT with modest benefit. Lumbar and Thoracic Spine MRIs 2024 demonstrate L4-L5: tiny right posterolateral annular fissure, small right foraminal disc protrusion. Mild facet arthropathy, trace facet joint effusions. Mild canal stenosis. Mild-to-moderate right and mild left foraminal narrowing L5-S1: Mild diffuse disc bulge, tiny right posterolateral  annular fissure. Pt was seen by Pain Mgmt who referred here for consutl/tx  2/4: Pt reports shoulder moving much better but still painful    Back Pain  This is a new problem. The current episode started more than 1 month ago. The problem occurs constantly. The problem has been waxing and waning since onset. The pain is present in the gluteal, lumbar spine, sacro-iliac and thoracic spine. The quality of the pain is described as aching. The pain does not radiate. The pain is Worse during the day. The symptoms are aggravated by twisting and bending (transitional mvmts, prolonged walking/standing, uphill/downhill, cobra stretch; palliative includes rest, massage, traction, laying supine). Pertinent negatives include no bladder incontinence or bowel incontinence.   Shoulder Pain     Neck Pain       Past Medical History:   Diagnosis Date    COVID-19 05/2022    Finger fracture, left     Intrinsic muscle tightness 01/16/2019    Pure hypercholesterolemia 10/14/2021    Trigger middle finger of left hand 01/16/2019    UTI symptoms 10/14/2021    Vitamin D deficiency       Past Surgical History:   Procedure Laterality Date    LAPAROSCOPIC OVARIAN CYSTECTOMY Right 2001    Lysis of adhesions, Dr. Miranda    OVARIAN CYST REMOVAL Right 1993    open, Dr. Arce    TONSILECTOMY AND ADNOIDECTOMY      TONSILLECTOMY      WISDOM TOOTH EXTRACTION       The following portions of the patient's history were reviewed and updated as appropriate: allergies, past family history, past medical history, past social history, past surgical history, and problem list.  Review of Systems   Gastrointestinal:  Negative for bowel incontinence.   Genitourinary:  Negative for bladder incontinence.   Musculoskeletal:  Positive for back pain and neck pain.     Physical Exam  Musculoskeletal:      Thoracic back: Spasms and tenderness present. Decreased range of motion.      Lumbar back: Spasms and tenderness present. Decreased range of motion. Negative right straight  leg raise test and negative left straight leg raise test.        Back:       Comments: Pnful and limited in Rrot 10, Blf 15(contralaterally), Ext 15, Ext/Brot   Skin:     General: Skin is warm and dry.   Neurological:      Mental Status: She is alert and oriented to person, place, and time.      Gait: Gait is intact.   Psychiatric:         Mood and Affect: Mood normal.         Behavior: Behavior normal.       SOFT TISSUE ASSESSMENT Hypertonicity and tenderness palpated L T 3-7, 10-S1 erector spinae, L hip flexor, L glute med/min, L QL, L hamstring JOINT RESTRICTIONS: T3-7, 10-S1, L R6, L R9, and L SIJ     Return in about 2 weeks (around 2/18/2025) for Next scheduled follow up.

## 2025-02-18 ENCOUNTER — PROCEDURE VISIT (OUTPATIENT)
Age: 50
End: 2025-02-18
Payer: COMMERCIAL

## 2025-02-18 VITALS — BODY MASS INDEX: 24.21 KG/M2 | WEIGHT: 150 LBS

## 2025-02-18 DIAGNOSIS — M46.1 SACROILIITIS (HCC): Primary | ICD-10-CM

## 2025-02-18 DIAGNOSIS — M79.18 MYOFASCIAL PAIN SYNDROME: ICD-10-CM

## 2025-02-18 DIAGNOSIS — M99.02 SEGMENTAL DYSFUNCTION OF THORACIC REGION: ICD-10-CM

## 2025-02-18 DIAGNOSIS — M75.81 ROTATOR CUFF TENDONITIS, RIGHT: ICD-10-CM

## 2025-02-18 DIAGNOSIS — M99.08 SOMATIC DYSFUNCTION OF COSTOVERTEBRAL JOINT STRUCTURE: ICD-10-CM

## 2025-02-18 DIAGNOSIS — M99.04 SEGMENTAL DYSFUNCTION OF SACRAL REGION: ICD-10-CM

## 2025-02-18 DIAGNOSIS — M24.552 HIP FLEXOR TENDON TIGHTNESS, LEFT: ICD-10-CM

## 2025-02-18 PROCEDURE — 98941 CHIROPRACT MANJ 3-4 REGIONS: CPT | Performed by: CHIROPRACTOR

## 2025-02-18 PROCEDURE — 97110 THERAPEUTIC EXERCISES: CPT | Performed by: CHIROPRACTOR

## 2025-02-19 NOTE — PROGRESS NOTES
Initial date of service: 7/10/24    Diagnoses and all orders for this visit:    Sacroiliitis (HCC)    Segmental dysfunction of sacral region    Hip flexor tendon tightness, left    Somatic dysfunction of costovertebral joint structure    Myofascial pain syndrome    Segmental dysfunction of thoracic region    Rotator cuff tendonitis, right    Mechanical neck/back/sh pain in the setting of DDD, exacerbated by postural/ergonomic stressors. Pt suffered exacerbation but responded well to tx with reduced pain and increased ROM; discussed referral for injection consult again but pt declined    TREATMENT: [id number], [id number]  Ther-ex: IASTM; discussed post procedure soreness and/or ecchymosis for up to 36 hrs, applied to affected mm hypertonicities; cobra stretch, standing extensions, hip flexor pin-and-stretch, transitional mvmt education, abdominal bracing; greater than 15 min spent performing above mentioned ther-ex to improve ROM/flexibility. Thoracic mobilization/manipulation: prone P-A mob, supine A-P manip; Lumbar mobilization/manipulation: diversified side laying graded HVLA, extension-traction; SIJ Manipulation/Mobilization: L SIJ HVLA - long axis distraction, mack drop table maneuver to affected SIJ    HPI:  Krys Alex is a 49 y.o. female  Chief Complaint   Patient presents with    Back Pain     Pain score 6    Shoulder Pain     Pain score 7     Pt presents for tx fpor L sided neck to lower back pain after catching falling patient while bent and twisted to her right 4/26/24 while working for [employer]. Pt has undergone PT with modest benefit. Lumbar and Thoracic Spine MRIs 2024 demonstrate L4-L5: tiny right posterolateral annular fissure, small right foraminal disc protrusion. Mild facet arthropathy, trace facet joint effusions. Mild canal stenosis. Mild-to-moderate right and mild left foraminal narrowing L5-S1: Mild diffuse disc bulge, tiny right posterolateral annular fissure. Pt was seen by  Pain Mgmt who referred here for consutl/tx  2/18: Pt reports flare-up    Back Pain  This is a new problem. The current episode started more than 1 month ago. The problem occurs constantly. The problem has been waxing and waning since onset. The pain is present in the gluteal, lumbar spine, sacro-iliac and thoracic spine. The quality of the pain is described as aching. The pain does not radiate. The pain is Worse during the day. The symptoms are aggravated by twisting and bending (transitional mvmts, prolonged walking/standing, uphill/downhill, cobra stretch; palliative includes rest, massage, traction, laying supine). Pertinent negatives include no bladder incontinence or bowel incontinence.   Shoulder Pain     Neck Pain       Past Medical History:   Diagnosis Date    COVID-19 05/2022    Finger fracture, left     Intrinsic muscle tightness 01/16/2019    Pure hypercholesterolemia 10/14/2021    Trigger middle finger of left hand 01/16/2019    UTI symptoms 10/14/2021    Vitamin D deficiency       Past Surgical History:   Procedure Laterality Date    LAPAROSCOPIC OVARIAN CYSTECTOMY Right 2001    Lysis of adhesions, Dr. Miranda    OVARIAN CYST REMOVAL Right 1993    open, Dr. Arce    TONSILECTOMY AND ADNOIDECTOMY      TONSILLECTOMY      WISDOM TOOTH EXTRACTION       The following portions of the patient's history were reviewed and updated as appropriate: allergies, past family history, past medical history, past social history, past surgical history, and problem list.  Review of Systems   Gastrointestinal:  Negative for bowel incontinence.   Genitourinary:  Negative for bladder incontinence.   Musculoskeletal:  Positive for back pain and neck pain.     Physical Exam  Musculoskeletal:      Thoracic back: Spasms and tenderness present. Decreased range of motion.      Lumbar back: Spasms and tenderness present. Decreased range of motion. Negative right straight leg raise test and negative left straight leg raise test.         Back:       Comments: Pnful and limited in Rrot 10, Blf 15(contralaterally), Ext 15, Ext/Brot   Skin:     General: Skin is warm and dry.   Neurological:      Mental Status: She is alert and oriented to person, place, and time.      Gait: Gait is intact.   Psychiatric:         Mood and Affect: Mood normal.         Behavior: Behavior normal.       SOFT TISSUE ASSESSMENT Hypertonicity and tenderness palpated L T 3-7, 10-S1 erector spinae, L hip flexor, L glute med/min, L QL, L hamstring JOINT RESTRICTIONS: T3-7, 10-S1, L R6, L R9, and L SIJ     Return in about 2 weeks (around 3/4/2025) for Next scheduled follow up.

## 2025-03-04 ENCOUNTER — PROCEDURE VISIT (OUTPATIENT)
Age: 50
End: 2025-03-04
Payer: COMMERCIAL

## 2025-03-04 VITALS — HEIGHT: 66 IN | BODY MASS INDEX: 24.11 KG/M2 | WEIGHT: 150 LBS

## 2025-03-04 DIAGNOSIS — M51.360 DEGENERATION OF INTERVERTEBRAL DISC OF LUMBAR REGION WITH DISCOGENIC BACK PAIN: ICD-10-CM

## 2025-03-04 DIAGNOSIS — M99.02 SEGMENTAL DYSFUNCTION OF THORACIC REGION: ICD-10-CM

## 2025-03-04 DIAGNOSIS — M46.1 SACROILIITIS (HCC): Primary | ICD-10-CM

## 2025-03-04 DIAGNOSIS — M24.552 HIP FLEXOR TENDON TIGHTNESS, LEFT: ICD-10-CM

## 2025-03-04 DIAGNOSIS — M79.18 MYOFASCIAL PAIN SYNDROME: ICD-10-CM

## 2025-03-04 DIAGNOSIS — M75.81 ROTATOR CUFF TENDONITIS, RIGHT: ICD-10-CM

## 2025-03-04 DIAGNOSIS — M99.04 SEGMENTAL DYSFUNCTION OF SACRAL REGION: ICD-10-CM

## 2025-03-04 DIAGNOSIS — M99.08 SOMATIC DYSFUNCTION OF COSTOVERTEBRAL JOINT STRUCTURE: ICD-10-CM

## 2025-03-04 PROCEDURE — 98941 CHIROPRACT MANJ 3-4 REGIONS: CPT | Performed by: CHIROPRACTOR

## 2025-03-04 PROCEDURE — 97110 THERAPEUTIC EXERCISES: CPT | Performed by: CHIROPRACTOR

## 2025-03-05 NOTE — PROGRESS NOTES
Initial date of service: 7/10/24    Diagnoses and all orders for this visit:    Sacroiliitis (HCC)    Segmental dysfunction of sacral region    Hip flexor tendon tightness, left    Somatic dysfunction of costovertebral joint structure    Myofascial pain syndrome    Segmental dysfunction of thoracic region    Rotator cuff tendonitis, right    Degeneration of intervertebral disc of lumbar region with discogenic back pain    Mechanical neck/back/sh pain in the setting of DDD, exacerbated by postural/ergonomic stressors. Pt suffered exacerbation but responded well to tx with reduced pain and increased ROM; discussed referral for injection consult again but pt declined    TREATMENT: [id number], [id number]  Ther-ex: IASTM; discussed post procedure soreness and/or ecchymosis for up to 36 hrs, applied to affected mm hypertonicities; cobra stretch, standing extensions, hip flexor pin-and-stretch, transitional mvmt education, abdominal bracing; greater than 15 min spent performing above mentioned ther-ex to improve ROM/flexibility. Thoracic mobilization/manipulation: prone P-A mob, supine A-P manip; Lumbar mobilization/manipulation: diversified side laying graded HVLA, extension-traction; SIJ Manipulation/Mobilization: L SIJ HVLA - long axis distraction, mack drop table maneuver to affected SIJ    HPI:  Krys Alex is a 49 y.o. female  Chief Complaint   Patient presents with    Neck Pain     Pain score 7    Back Pain     Pain score 7     Pt presents for tx fpor L sided neck to lower back pain after catching falling patient while bent and twisted to her right 4/26/24 while working for [employer]. Pt has undergone PT with modest benefit. Lumbar and Thoracic Spine MRIs 2024 demonstrate L4-L5: tiny right posterolateral annular fissure, small right foraminal disc protrusion. Mild facet arthropathy, trace facet joint effusions. Mild canal stenosis. Mild-to-moderate right and mild left foraminal narrowing L5-S1: Mild  diffuse disc bulge, tiny right posterolateral annular fissure. Pt was seen by Pain Mgmt who referred here for consutl/tx  3/4: Pt reports flare-up    Back Pain  This is a new problem. The current episode started more than 1 month ago. The problem occurs constantly. The problem has been waxing and waning since onset. The pain is present in the gluteal, lumbar spine, sacro-iliac and thoracic spine. The quality of the pain is described as aching. The pain does not radiate. The pain is Worse during the day. The symptoms are aggravated by twisting and bending (transitional mvmts, prolonged walking/standing, uphill/downhill, cobra stretch; palliative includes rest, massage, traction, laying supine). Pertinent negatives include no bladder incontinence or bowel incontinence.   Shoulder Pain     Neck Pain       Past Medical History:   Diagnosis Date    COVID-19 05/2022    Finger fracture, left     Intrinsic muscle tightness 01/16/2019    Pure hypercholesterolemia 10/14/2021    Trigger middle finger of left hand 01/16/2019    UTI symptoms 10/14/2021    Vitamin D deficiency       Past Surgical History:   Procedure Laterality Date    LAPAROSCOPIC OVARIAN CYSTECTOMY Right 2001    Lysis of adhesions, Dr. Miranda    OVARIAN CYST REMOVAL Right 1993    open, Dr. Arce    TONSILECTOMY AND ADNOIDECTOMY      TONSILLECTOMY      WISDOM TOOTH EXTRACTION       The following portions of the patient's history were reviewed and updated as appropriate: allergies, past family history, past medical history, past social history, past surgical history, and problem list.  Review of Systems   Gastrointestinal:  Negative for bowel incontinence.   Genitourinary:  Negative for bladder incontinence.   Musculoskeletal:  Positive for back pain and neck pain.     Physical Exam  Musculoskeletal:      Thoracic back: Spasms and tenderness present. Decreased range of motion.      Lumbar back: Spasms and tenderness present. Decreased range of motion. Negative right  straight leg raise test and negative left straight leg raise test.        Back:       Comments: Pnful and limited in Rrot 10, Blf 15(contralaterally), Ext 15, Ext/Brot   Skin:     General: Skin is warm and dry.   Neurological:      Mental Status: She is alert and oriented to person, place, and time.      Gait: Gait is intact.   Psychiatric:         Mood and Affect: Mood normal.         Behavior: Behavior normal.       SOFT TISSUE ASSESSMENT Hypertonicity and tenderness palpated L T 3-7, 10-S1 erector spinae, L hip flexor, L glute med/min, L QL, L hamstring JOINT RESTRICTIONS: T3-7, 10-S1, L R6, L R9, and L SIJ     Return in about 2 weeks (around 3/18/2025) for Next scheduled follow up.

## 2025-03-18 ENCOUNTER — PROCEDURE VISIT (OUTPATIENT)
Age: 50
End: 2025-03-18
Payer: COMMERCIAL

## 2025-03-18 VITALS — WEIGHT: 150 LBS | BODY MASS INDEX: 24.11 KG/M2 | HEIGHT: 66 IN

## 2025-03-18 DIAGNOSIS — M99.04 SEGMENTAL DYSFUNCTION OF SACRAL REGION: ICD-10-CM

## 2025-03-18 DIAGNOSIS — M24.552 HIP FLEXOR TENDON TIGHTNESS, LEFT: ICD-10-CM

## 2025-03-18 DIAGNOSIS — M51.360 DEGENERATION OF INTERVERTEBRAL DISC OF LUMBAR REGION WITH DISCOGENIC BACK PAIN: ICD-10-CM

## 2025-03-18 DIAGNOSIS — M99.08 SOMATIC DYSFUNCTION OF COSTOVERTEBRAL JOINT STRUCTURE: ICD-10-CM

## 2025-03-18 DIAGNOSIS — M46.1 SACROILIITIS (HCC): Primary | ICD-10-CM

## 2025-03-18 DIAGNOSIS — M79.18 MYOFASCIAL PAIN SYNDROME: ICD-10-CM

## 2025-03-18 DIAGNOSIS — M99.02 SEGMENTAL DYSFUNCTION OF THORACIC REGION: ICD-10-CM

## 2025-03-18 DIAGNOSIS — M75.81 ROTATOR CUFF TENDONITIS, RIGHT: ICD-10-CM

## 2025-03-18 PROCEDURE — 97110 THERAPEUTIC EXERCISES: CPT | Performed by: CHIROPRACTOR

## 2025-03-18 PROCEDURE — 98941 CHIROPRACT MANJ 3-4 REGIONS: CPT | Performed by: CHIROPRACTOR

## 2025-03-19 NOTE — PROGRESS NOTES
Initial date of service: 7/10/24    Diagnoses and all orders for this visit:    Sacroiliitis (HCC)    Segmental dysfunction of sacral region    Hip flexor tendon tightness, left    Somatic dysfunction of costovertebral joint structure    Myofascial pain syndrome    Segmental dysfunction of thoracic region    Rotator cuff tendonitis, right    Degeneration of intervertebral disc of lumbar region with discogenic back pain    Mechanical neck/back/sh pain in the setting of DDD, exacerbated by postural/ergonomic stressors. Pt suffered exacerbation but responded well to tx with reduced pain and increased ROM; discussed referral for injection consult again but pt declined    TREATMENT: [id number], [id number]  Ther-ex: IASTM; discussed post procedure soreness and/or ecchymosis for up to 36 hrs, applied to affected mm hypertonicities; cobra stretch, standing extensions, hip flexor pin-and-stretch, transitional mvmt education, abdominal bracing; greater than 15 min spent performing above mentioned ther-ex to improve ROM/flexibility. Thoracic mobilization/manipulation: prone P-A mob, supine A-P manip; Lumbar mobilization/manipulation: diversified side laying graded HVLA, extension-traction; SIJ Manipulation/Mobilization: L SIJ HVLA - long axis distraction, mack drop table maneuver to affected SIJ    HPI:  Krys Alex is a 49 y.o. female  Chief Complaint   Patient presents with    Neck Pain     Pain score 8    Back Pain     Pain score 8    Shoulder Pain     Pain score 8     Pt presents for tx fpor L sided neck to lower back pain after catching falling patient while bent and twisted to her right 4/26/24 while working for [employer]. Pt has undergone PT with modest benefit. Lumbar and Thoracic Spine MRIs 2024 demonstrate L4-L5: tiny right posterolateral annular fissure, small right foraminal disc protrusion. Mild facet arthropathy, trace facet joint effusions. Mild canal stenosis. Mild-to-moderate right and mild left  foraminal narrowing L5-S1: Mild diffuse disc bulge, tiny right posterolateral annular fissure. Pt was seen by Pain Mgmt who referred here for consutl/tx  3/19: Pt reports flare-up    Back Pain  This is a new problem. The current episode started more than 1 month ago. The problem occurs constantly. The problem has been waxing and waning since onset. The pain is present in the gluteal, lumbar spine, sacro-iliac and thoracic spine. The quality of the pain is described as aching. The pain does not radiate. The pain is Worse during the day. The symptoms are aggravated by twisting and bending (transitional mvmts, prolonged walking/standing, uphill/downhill, cobra stretch; palliative includes rest, massage, traction, laying supine). Pertinent negatives include no bladder incontinence or bowel incontinence.   Shoulder Pain     Neck Pain       Past Medical History:   Diagnosis Date    COVID-19 05/2022    Finger fracture, left     Intrinsic muscle tightness 01/16/2019    Pure hypercholesterolemia 10/14/2021    Trigger middle finger of left hand 01/16/2019    UTI symptoms 10/14/2021    Vitamin D deficiency       Past Surgical History:   Procedure Laterality Date    LAPAROSCOPIC OVARIAN CYSTECTOMY Right 2001    Lysis of adhesions, Dr. Miranda    OVARIAN CYST REMOVAL Right 1993    open, Dr. Arce    TONSILECTOMY AND ADNOIDECTOMY      TONSILLECTOMY      WISDOM TOOTH EXTRACTION       The following portions of the patient's history were reviewed and updated as appropriate: allergies, past family history, past medical history, past social history, past surgical history, and problem list.  Review of Systems   Gastrointestinal:  Negative for bowel incontinence.   Genitourinary:  Negative for bladder incontinence.   Musculoskeletal:  Positive for back pain and neck pain.     Physical Exam  Musculoskeletal:      Thoracic back: Spasms and tenderness present. Decreased range of motion.      Lumbar back: Spasms and tenderness present. Decreased  range of motion. Negative right straight leg raise test and negative left straight leg raise test.        Back:       Comments: Pnful and limited in Rrot 10, Blf 15(contralaterally), Ext 15, Ext/Brot   Skin:     General: Skin is warm and dry.   Neurological:      Mental Status: She is alert and oriented to person, place, and time.      Gait: Gait is intact.   Psychiatric:         Mood and Affect: Mood normal.         Behavior: Behavior normal.       SOFT TISSUE ASSESSMENT Hypertonicity and tenderness palpated L T 3-7, 10-S1 erector spinae, L hip flexor, L glute med/min, L QL, L hamstring JOINT RESTRICTIONS: T3-7, 10-S1, L R6, L R9, and L SIJ     Return in about 1 week (around 3/25/2025) for Next scheduled follow up.

## 2025-03-27 ENCOUNTER — PROCEDURE VISIT (OUTPATIENT)
Age: 50
End: 2025-03-27
Payer: COMMERCIAL

## 2025-03-27 VITALS — BODY MASS INDEX: 24.11 KG/M2 | WEIGHT: 150 LBS | HEIGHT: 66 IN

## 2025-03-27 DIAGNOSIS — M99.04 SEGMENTAL DYSFUNCTION OF SACRAL REGION: ICD-10-CM

## 2025-03-27 DIAGNOSIS — M79.18 MYOFASCIAL PAIN SYNDROME: ICD-10-CM

## 2025-03-27 DIAGNOSIS — M24.552 HIP FLEXOR TENDON TIGHTNESS, LEFT: ICD-10-CM

## 2025-03-27 DIAGNOSIS — M99.08 SOMATIC DYSFUNCTION OF COSTOVERTEBRAL JOINT STRUCTURE: ICD-10-CM

## 2025-03-27 DIAGNOSIS — M99.02 SEGMENTAL DYSFUNCTION OF THORACIC REGION: ICD-10-CM

## 2025-03-27 DIAGNOSIS — M46.1 SACROILIITIS (HCC): Primary | ICD-10-CM

## 2025-03-27 PROCEDURE — 97110 THERAPEUTIC EXERCISES: CPT | Performed by: CHIROPRACTOR

## 2025-03-27 PROCEDURE — 98941 CHIROPRACT MANJ 3-4 REGIONS: CPT | Performed by: CHIROPRACTOR

## 2025-03-31 NOTE — PROGRESS NOTES
Initial date of service: 7/10/24    Diagnoses and all orders for this visit:    Sacroiliitis (HCC)    Segmental dysfunction of sacral region    Hip flexor tendon tightness, left    Somatic dysfunction of costovertebral joint structure    Myofascial pain syndrome    Segmental dysfunction of thoracic region    Mechanical neck/back/sh pain in the setting of DDD, exacerbated by postural/ergonomic stressors. Pt suffered exacerbation but responded well to tx with reduced pain and increased ROM; discussed referral for injection consult again but pt declined    TREATMENT: [id number], [id number]  Ther-ex: IASTM; discussed post procedure soreness and/or ecchymosis for up to 36 hrs, applied to affected mm hypertonicities; cobra stretch, standing extensions, hip flexor pin-and-stretch, transitional mvmt education, abdominal bracing; greater than 15 min spent performing above mentioned ther-ex to improve ROM/flexibility. Thoracic mobilization/manipulation: prone P-A mob, supine A-P manip; Lumbar mobilization/manipulation: diversified side laying graded HVLA, extension-traction; SIJ Manipulation/Mobilization: L SIJ HVLA - long axis distraction, mack drop table maneuver to affected SIJ    HPI:  Krys Alex is a 49 y.o. female  Chief Complaint   Patient presents with    Back Pain     Middle back pain-7  Lower back pain-7    Neck Pain     Neck pain-7    Shoulder Pain     Shoulder pain-7     Pt presents for tx fpor L sided neck to lower back pain after catching falling patient while bent and twisted to her right 4/26/24 while working for [employer]. Pt has undergone PT with modest benefit. Lumbar and Thoracic Spine MRIs 2024 demonstrate L4-L5: tiny right posterolateral annular fissure, small right foraminal disc protrusion. Mild facet arthropathy, trace facet joint effusions. Mild canal stenosis. Mild-to-moderate right and mild left foraminal narrowing L5-S1: Mild diffuse disc bulge, tiny right posterolateral annular  fissure. Pt was seen by Pain Mgmt who referred here for consutl/tx  3/27: Pt reports shoulder is better but neck/back pain flared    Back Pain  This is a new problem. The current episode started more than 1 month ago. The problem occurs constantly. The problem has been waxing and waning since onset. The pain is present in the gluteal, lumbar spine, sacro-iliac and thoracic spine. The quality of the pain is described as aching. The pain does not radiate. The pain is Worse during the day. The symptoms are aggravated by twisting and bending (transitional mvmts, prolonged walking/standing, uphill/downhill, cobra stretch; palliative includes rest, massage, traction, laying supine). Pertinent negatives include no bladder incontinence or bowel incontinence.   Shoulder Pain     Neck Pain       Past Medical History:   Diagnosis Date    COVID-19 05/2022    Finger fracture, left     Intrinsic muscle tightness 01/16/2019    Pure hypercholesterolemia 10/14/2021    Trigger middle finger of left hand 01/16/2019    UTI symptoms 10/14/2021    Vitamin D deficiency       Past Surgical History:   Procedure Laterality Date    LAPAROSCOPIC OVARIAN CYSTECTOMY Right 2001    Lysis of adhesions, Dr. Miranda    OVARIAN CYST REMOVAL Right 1993    open, Dr. Arce    TONSILECTOMY AND ADNOIDECTOMY      TONSILLECTOMY      WISDOM TOOTH EXTRACTION       The following portions of the patient's history were reviewed and updated as appropriate: allergies, past family history, past medical history, past social history, past surgical history, and problem list.  Review of Systems   Gastrointestinal:  Negative for bowel incontinence.   Genitourinary:  Negative for bladder incontinence.   Musculoskeletal:  Positive for back pain and neck pain.     Physical Exam  Musculoskeletal:      Thoracic back: Spasms and tenderness present. Decreased range of motion.      Lumbar back: Spasms and tenderness present. Decreased range of motion. Negative right straight leg raise  test and negative left straight leg raise test.        Back:       Comments: Pnful and limited in Rrot 10, Blf 15(contralaterally), Ext 15, Ext/Brot   Skin:     General: Skin is warm and dry.   Neurological:      Mental Status: She is alert and oriented to person, place, and time.      Gait: Gait is intact.   Psychiatric:         Mood and Affect: Mood normal.         Behavior: Behavior normal.       SOFT TISSUE ASSESSMENT Hypertonicity and tenderness palpated L T 3-7, 10-S1 erector spinae, L hip flexor, L glute med/min, L QL, L hamstring JOINT RESTRICTIONS: T3-7, 10-S1, L R6, L R9, and L SIJ     Return in about 2 weeks (around 4/10/2025) for Next scheduled follow up.

## 2025-04-01 ENCOUNTER — TELEPHONE (OUTPATIENT)
Age: 50
End: 2025-04-01

## 2025-04-01 NOTE — TELEPHONE ENCOUNTER
LMOM to see if patient was interested in scheduling chiropractic follow-up with us. Left call back number for scheduling.

## 2025-04-08 ENCOUNTER — PROCEDURE VISIT (OUTPATIENT)
Age: 50
End: 2025-04-08
Payer: COMMERCIAL

## 2025-04-08 VITALS
HEART RATE: 86 BPM | DIASTOLIC BLOOD PRESSURE: 78 MMHG | OXYGEN SATURATION: 99 % | SYSTOLIC BLOOD PRESSURE: 126 MMHG | BODY MASS INDEX: 24.11 KG/M2 | HEIGHT: 66 IN | WEIGHT: 150 LBS

## 2025-04-08 DIAGNOSIS — M79.18 MYOFASCIAL PAIN SYNDROME: ICD-10-CM

## 2025-04-08 DIAGNOSIS — M99.02 SEGMENTAL DYSFUNCTION OF THORACIC REGION: ICD-10-CM

## 2025-04-08 DIAGNOSIS — M99.08 SOMATIC DYSFUNCTION OF COSTOVERTEBRAL JOINT STRUCTURE: ICD-10-CM

## 2025-04-08 DIAGNOSIS — M24.552 HIP FLEXOR TENDON TIGHTNESS, LEFT: ICD-10-CM

## 2025-04-08 DIAGNOSIS — M99.04 SEGMENTAL DYSFUNCTION OF SACRAL REGION: ICD-10-CM

## 2025-04-08 DIAGNOSIS — M46.1 SACROILIITIS (HCC): Primary | ICD-10-CM

## 2025-04-08 PROCEDURE — 97110 THERAPEUTIC EXERCISES: CPT | Performed by: CHIROPRACTOR

## 2025-04-08 PROCEDURE — 98941 CHIROPRACT MANJ 3-4 REGIONS: CPT | Performed by: CHIROPRACTOR

## 2025-04-08 NOTE — PROGRESS NOTES
Initial date of service: 7/10/24    Diagnoses and all orders for this visit:    Sacroiliitis (HCC)    Segmental dysfunction of sacral region    Hip flexor tendon tightness, left    Somatic dysfunction of costovertebral joint structure    Myofascial pain syndrome    Segmental dysfunction of thoracic region    Mechanical neck/back/sh pain in the setting of DDD, exacerbated by postural/ergonomic stressors. Pt suffered exacerbation but responded well to tx with reduced pain and increased ROM. F/up 2 wks    TREATMENT: 45558, 59721  Ther-ex: IASTM; discussed post procedure soreness and/or ecchymosis for up to 36 hrs, applied to affected mm hypertonicities; cobra stretch, standing extensions, hip flexor pin-and-stretch, transitional mvmt education, abdominal bracing; greater than 15 min spent performing above mentioned ther-ex to improve ROM/flexibility. Thoracic mobilization/manipulation: prone P-A mob, supine A-P manip; Lumbar mobilization/manipulation: diversified side laying graded HVLA, extension-traction; SIJ Manipulation/Mobilization: L SIJ HVLA - long axis distraction, mack drop table maneuver to affected SIJ    HPI:  Krys Alex is a 49 y.o. female  Chief Complaint   Patient presents with    Back Pain     Middle back pain-5  Lower back pain-5    Neck Pain     Neck pain-5    Shoulder Pain     Shoulder pain-5     Pt presents for tx fpor L sided neck to lower back pain after catching falling patient while bent and twisted to her right 4/26/24 while working for [employer]. Pt has undergone PT with modest benefit. Lumbar and Thoracic Spine MRIs 2024 demonstrate L4-L5: tiny right posterolateral annular fissure, small right foraminal disc protrusion. Mild facet arthropathy, trace facet joint effusions. Mild canal stenosis. Mild-to-moderate right and mild left foraminal narrowing L5-S1: Mild diffuse disc bulge, tiny right posterolateral annular fissure. Pt was seen by Pain Mgmt who referred here for  consutl/tx  4/8: Pt reports flare-up with raking leaves in front yard and standing in place for lengthy period of time at work    Back Pain  This is a new problem. The current episode started more than 1 month ago. The problem occurs constantly. The problem has been waxing and waning since onset. The pain is present in the gluteal, lumbar spine, sacro-iliac and thoracic spine. The quality of the pain is described as aching. The pain does not radiate. The pain is Worse during the day. The symptoms are aggravated by twisting and bending (transitional mvmts, prolonged walking/standing, uphill/downhill, cobra stretch; palliative includes rest, massage, traction, laying supine). Pertinent negatives include no bladder incontinence or bowel incontinence.   Shoulder Pain     Neck Pain       Past Medical History:   Diagnosis Date    COVID-19 05/2022    Finger fracture, left     Intrinsic muscle tightness 01/16/2019    Pure hypercholesterolemia 10/14/2021    Trigger middle finger of left hand 01/16/2019    UTI symptoms 10/14/2021    Vitamin D deficiency       Past Surgical History:   Procedure Laterality Date    LAPAROSCOPIC OVARIAN CYSTECTOMY Right 2001    Lysis of adhesions, Dr. Miranda    OVARIAN CYST REMOVAL Right 1993    open, Dr. Arce    TONSILECTOMY AND ADNOIDECTOMY      TONSILLECTOMY      WISDOM TOOTH EXTRACTION       The following portions of the patient's history were reviewed and updated as appropriate: allergies, past family history, past medical history, past social history, past surgical history, and problem list.  Review of Systems   Gastrointestinal:  Negative for bowel incontinence.   Genitourinary:  Negative for bladder incontinence.   Musculoskeletal:  Positive for back pain and neck pain.     Physical Exam  Musculoskeletal:      Thoracic back: Spasms and tenderness present. Decreased range of motion.      Lumbar back: Spasms and tenderness present. Decreased range of motion. Negative right straight leg raise  test and negative left straight leg raise test.        Back:       Comments: Pnful and limited in Rrot 10, Blf 15(contralaterally), Ext 15, Ext/Brot   Skin:     General: Skin is warm and dry.   Neurological:      Mental Status: She is alert and oriented to person, place, and time.      Gait: Gait is intact.   Psychiatric:         Mood and Affect: Mood normal.         Behavior: Behavior normal.       SOFT TISSUE ASSESSMENT Hypertonicity and tenderness palpated L T 3-7, 10-S1 erector spinae, L hip flexor, L glute med/min, L QL, L hamstring JOINT RESTRICTIONS: T3-7, 10-S1, L R6, L R9, and L SIJ     Return in about 2 weeks (around 4/22/2025) for Next scheduled follow up.

## 2025-04-28 ENCOUNTER — PROCEDURE VISIT (OUTPATIENT)
Age: 50
End: 2025-04-28
Payer: COMMERCIAL

## 2025-04-28 VITALS
HEIGHT: 66 IN | WEIGHT: 150 LBS | SYSTOLIC BLOOD PRESSURE: 128 MMHG | BODY MASS INDEX: 24.11 KG/M2 | DIASTOLIC BLOOD PRESSURE: 82 MMHG

## 2025-04-28 DIAGNOSIS — M79.18 MYOFASCIAL PAIN SYNDROME: ICD-10-CM

## 2025-04-28 DIAGNOSIS — M99.04 SEGMENTAL DYSFUNCTION OF SACRAL REGION: ICD-10-CM

## 2025-04-28 DIAGNOSIS — M99.08 SOMATIC DYSFUNCTION OF COSTOVERTEBRAL JOINT STRUCTURE: ICD-10-CM

## 2025-04-28 DIAGNOSIS — M99.02 SEGMENTAL DYSFUNCTION OF THORACIC REGION: ICD-10-CM

## 2025-04-28 DIAGNOSIS — M75.81 ROTATOR CUFF TENDONITIS, RIGHT: ICD-10-CM

## 2025-04-28 DIAGNOSIS — M24.552 HIP FLEXOR TENDON TIGHTNESS, LEFT: ICD-10-CM

## 2025-04-28 DIAGNOSIS — M46.1 SACROILIITIS (HCC): Primary | ICD-10-CM

## 2025-04-28 PROCEDURE — 98941 CHIROPRACT MANJ 3-4 REGIONS: CPT | Performed by: CHIROPRACTOR

## 2025-04-28 PROCEDURE — 97110 THERAPEUTIC EXERCISES: CPT | Performed by: CHIROPRACTOR

## 2025-04-29 NOTE — PROGRESS NOTES
Initial date of service: 7/10/24    Diagnoses and all orders for this visit:    Sacroiliitis (HCC)    Segmental dysfunction of sacral region    Hip flexor tendon tightness, left    Somatic dysfunction of costovertebral joint structure    Myofascial pain syndrome    Segmental dysfunction of thoracic region    Rotator cuff tendonitis, right    Mechanical neck/back/sh pain in the setting of DDD, exacerbated by postural/ergonomic stressors. Pt suffered exacerbation but responded well to tx with reduced pain and increased ROM. F/up 2 wks    TREATMENT: 60808, 23314  Ther-ex: IASTM; discussed post procedure soreness and/or ecchymosis for up to 36 hrs, applied to affected mm hypertonicities; cobra stretch, standing extensions, hip flexor pin-and-stretch, transitional mvmt education, abdominal bracing; greater than 15 min spent performing above mentioned ther-ex to improve ROM/flexibility. Thoracic mobilization/manipulation: prone P-A mob, supine A-P manip; Lumbar mobilization/manipulation: diversified side laying graded HVLA, extension-traction; SIJ Manipulation/Mobilization: L SIJ HVLA - long axis distraction, mack drop table maneuver to affected SIJ    HPI:  Krys Alex is a 50 y.o. female  Chief Complaint   Patient presents with    Neck Pain     Neck pain left limited ROM about 7    Back Pain     Low back and left hip about a 7      Pt presents for tx fpor L sided neck to lower back pain after catching falling patient while bent and twisted to her right 4/26/24 while working for [employer]. Pt has undergone PT with modest benefit. Lumbar and Thoracic Spine MRIs 2024 demonstrate L4-L5: tiny right posterolateral annular fissure, small right foraminal disc protrusion. Mild facet arthropathy, trace facet joint effusions. Mild canal stenosis. Mild-to-moderate right and mild left foraminal narrowing L5-S1: Mild diffuse disc bulge, tiny right posterolateral annular fissure. Pt was seen by Pain Mgmt who  referred here for consutl/tx  4/27: Pt reports flare-up with a trip and fall    Back Pain  This is a chronic problem. The current episode started more than 1 year ago. The problem occurs daily. The problem has been waxing and waning since onset. The pain is present in the gluteal, lumbar spine, sacro-iliac and thoracic spine. The quality of the pain is described as aching. The pain does not radiate. The pain is Worse during the day. The symptoms are aggravated by twisting and bending (transitional mvmts, prolonged walking/standing, uphill/downhill, cobra stretch; palliative includes rest, massage, traction, laying supine). Pertinent negatives include no bladder incontinence or bowel incontinence.   Shoulder Pain     Neck Pain       Past Medical History:   Diagnosis Date    COVID-19 05/2022    Finger fracture, left     Intrinsic muscle tightness 01/16/2019    Pure hypercholesterolemia 10/14/2021    Trigger middle finger of left hand 01/16/2019    UTI symptoms 10/14/2021    Vitamin D deficiency       Past Surgical History:   Procedure Laterality Date    LAPAROSCOPIC OVARIAN CYSTECTOMY Right 2001    Lysis of adhesions, Dr. Miranda    OVARIAN CYST REMOVAL Right 1993    open, Dr. Arce    TONSILECTOMY AND ADNOIDECTOMY      TONSILLECTOMY      WISDOM TOOTH EXTRACTION       The following portions of the patient's history were reviewed and updated as appropriate: allergies, past family history, past medical history, past social history, past surgical history, and problem list.  Review of Systems   Gastrointestinal:  Negative for bowel incontinence.   Genitourinary:  Negative for bladder incontinence.   Musculoskeletal:  Positive for back pain and neck pain.     Physical Exam  Musculoskeletal:      Thoracic back: Spasms and tenderness present. Decreased range of motion.      Lumbar back: Spasms and tenderness present. Decreased range of motion. Negative right straight leg raise test and negative left straight leg raise test.         Back:       Comments: Pnful and limited in Rrot 10, Blf 15(contralaterally), Ext 15, Ext/Brot   Skin:     General: Skin is warm and dry.   Neurological:      Mental Status: She is alert and oriented to person, place, and time.      Gait: Gait is intact.   Psychiatric:         Mood and Affect: Mood normal.         Behavior: Behavior normal.       SOFT TISSUE ASSESSMENT Hypertonicity and tenderness palpated L T 3-7, 10-S1 erector spinae, L hip flexor, L glute med/min, L QL, L hamstring JOINT RESTRICTIONS: T3-7, 10-S1, L R6, L R9, and L SIJ     Return in about 2 weeks (around 5/12/2025) for Next scheduled follow up.

## 2025-05-05 ENCOUNTER — PROCEDURE VISIT (OUTPATIENT)
Age: 50
End: 2025-05-05
Payer: COMMERCIAL

## 2025-05-05 VITALS
SYSTOLIC BLOOD PRESSURE: 123 MMHG | WEIGHT: 150 LBS | BODY MASS INDEX: 24.11 KG/M2 | DIASTOLIC BLOOD PRESSURE: 80 MMHG | HEIGHT: 66 IN

## 2025-05-05 DIAGNOSIS — M99.04 SEGMENTAL DYSFUNCTION OF SACRAL REGION: ICD-10-CM

## 2025-05-05 DIAGNOSIS — M99.08 SOMATIC DYSFUNCTION OF COSTOVERTEBRAL JOINT STRUCTURE: ICD-10-CM

## 2025-05-05 DIAGNOSIS — M46.1 SACROILIITIS (HCC): Primary | ICD-10-CM

## 2025-05-05 DIAGNOSIS — M99.02 SEGMENTAL DYSFUNCTION OF THORACIC REGION: ICD-10-CM

## 2025-05-05 DIAGNOSIS — M79.18 MYOFASCIAL PAIN SYNDROME: ICD-10-CM

## 2025-05-05 DIAGNOSIS — M24.552 HIP FLEXOR TENDON TIGHTNESS, LEFT: ICD-10-CM

## 2025-05-05 PROCEDURE — 97110 THERAPEUTIC EXERCISES: CPT | Performed by: CHIROPRACTOR

## 2025-05-05 PROCEDURE — 98941 CHIROPRACT MANJ 3-4 REGIONS: CPT | Performed by: CHIROPRACTOR

## 2025-05-06 NOTE — PROGRESS NOTES
Initial date of service: 7/10/24    Diagnoses and all orders for this visit:    Sacroiliitis (HCC)    Segmental dysfunction of sacral region    Hip flexor tendon tightness, left    Somatic dysfunction of costovertebral joint structure    Myofascial pain syndrome    Segmental dysfunction of thoracic region    Mechanical neck/back/sh pain in the setting of DDD, exacerbated by postural/ergonomic stressors. Pt suffered exacerbation but responded well to tx with reduced pain and increased ROM. F/up 2-3 wks    TREATMENT: 41721, 58017  Ther-ex: IASTM; discussed post procedure soreness and/or ecchymosis for up to 36 hrs, applied to affected mm hypertonicities; cobra stretch, standing extensions, hip flexor pin-and-stretch, transitional mvmt education, abdominal bracing; greater than 15 min spent performing above mentioned ther-ex to improve ROM/flexibility. Thoracic mobilization/manipulation: prone P-A mob, supine A-P manip; Lumbar mobilization/manipulation: diversified side laying graded HVLA, extension-traction; SIJ Manipulation/Mobilization: L SIJ HVLA - long axis distraction, mack drop table maneuver to affected SIJ    HPI:  Krys Alex is a 50 y.o. female  Chief Complaint   Patient presents with    Neck Pain     Neck and thoracic pain about a 5 left side     Back Pain     Low back left side about a 6      Pt presents for tx fpor L sided neck to lower back pain after catching falling patient while bent and twisted to her right 4/26/24 while working for [employer]. Pt has undergone PT with modest benefit. Lumbar and Thoracic Spine MRIs 2024 demonstrate L4-L5: tiny right posterolateral annular fissure, small right foraminal disc protrusion. Mild facet arthropathy, trace facet joint effusions. Mild canal stenosis. Mild-to-moderate right and mild left foraminal narrowing L5-S1: Mild diffuse disc bulge, tiny right posterolateral annular fissure. Pt was seen by Pain Mgmt who referred here for  consutl/tx  5/6: Pt reports symptoms not as bad    Back Pain  This is a chronic problem. The current episode started more than 1 year ago. The problem occurs daily. The problem has been waxing and waning since onset. The pain is present in the gluteal, lumbar spine, sacro-iliac and thoracic spine. The quality of the pain is described as aching. The pain does not radiate. The pain is Worse during the day. The symptoms are aggravated by twisting and bending (transitional mvmts, prolonged walking/standing, uphill/downhill, cobra stretch; palliative includes rest, massage, traction, laying supine). Pertinent negatives include no bladder incontinence or bowel incontinence.   Shoulder Pain     Neck Pain       Past Medical History:   Diagnosis Date    COVID-19 05/2022    Finger fracture, left     Intrinsic muscle tightness 01/16/2019    Pure hypercholesterolemia 10/14/2021    Trigger middle finger of left hand 01/16/2019    UTI symptoms 10/14/2021    Vitamin D deficiency       Past Surgical History:   Procedure Laterality Date    LAPAROSCOPIC OVARIAN CYSTECTOMY Right 2001    Lysis of adhesions, Dr. Miranda    OVARIAN CYST REMOVAL Right 1993    open, Dr. Arce    TONSILECTOMY AND ADNOIDECTOMY      TONSILLECTOMY      WISDOM TOOTH EXTRACTION       The following portions of the patient's history were reviewed and updated as appropriate: allergies, past family history, past medical history, past social history, past surgical history, and problem list.  Review of Systems   Gastrointestinal:  Negative for bowel incontinence.   Genitourinary:  Negative for bladder incontinence.   Musculoskeletal:  Positive for back pain and neck pain.     Physical Exam  Musculoskeletal:      Thoracic back: Spasms and tenderness present. Decreased range of motion.      Lumbar back: Spasms and tenderness present. Decreased range of motion. Negative right straight leg raise test and negative left straight leg raise test.        Back:       Comments: Pnful  and limited in Rrot 10, Blf 15(contralaterally), Ext 15, Ext/Brot   Skin:     General: Skin is warm and dry.   Neurological:      Mental Status: She is alert and oriented to person, place, and time.      Gait: Gait is intact.   Psychiatric:         Mood and Affect: Mood normal.         Behavior: Behavior normal.       SOFT TISSUE ASSESSMENT Hypertonicity and tenderness palpated L T 3-7, 10-S1 erector spinae, L hip flexor, L glute med/min, L QL, L hamstring JOINT RESTRICTIONS: T3-7, 10-S1, L R6, L R9, and L SIJ     Return in about 2 weeks (around 5/19/2025) for Next scheduled follow up.

## 2025-05-09 ENCOUNTER — APPOINTMENT (EMERGENCY)
Dept: CT IMAGING | Facility: HOSPITAL | Age: 50
End: 2025-05-09
Payer: COMMERCIAL

## 2025-05-09 ENCOUNTER — HOSPITAL ENCOUNTER (EMERGENCY)
Facility: HOSPITAL | Age: 50
Discharge: HOME | End: 2025-05-09
Attending: EMERGENCY MEDICINE
Payer: COMMERCIAL

## 2025-05-09 VITALS
TEMPERATURE: 98.2 F | BODY MASS INDEX: 24.21 KG/M2 | OXYGEN SATURATION: 98 % | HEART RATE: 86 BPM | WEIGHT: 150 LBS | RESPIRATION RATE: 17 BRPM | SYSTOLIC BLOOD PRESSURE: 165 MMHG | DIASTOLIC BLOOD PRESSURE: 98 MMHG

## 2025-05-09 DIAGNOSIS — G43.909 MIGRAINE HEADACHE: ICD-10-CM

## 2025-05-09 DIAGNOSIS — R04.0 EPISTAXIS: Primary | ICD-10-CM

## 2025-05-09 DIAGNOSIS — S06.0X0A CONCUSSION WITHOUT LOSS OF CONSCIOUSNESS, INITIAL ENCOUNTER: ICD-10-CM

## 2025-05-09 PROCEDURE — 99284 EMERGENCY DEPT VISIT MOD MDM: CPT | Performed by: EMERGENCY MEDICINE

## 2025-05-09 PROCEDURE — 70450 CT HEAD/BRAIN W/O DYE: CPT

## 2025-05-09 PROCEDURE — 99283 EMERGENCY DEPT VISIT LOW MDM: CPT

## 2025-05-09 RX ORDER — BUTALBITAL, ACETAMINOPHEN AND CAFFEINE 50; 325; 40 MG/1; MG/1; MG/1
1 TABLET ORAL EVERY 6 HOURS PRN
Qty: 15 TABLET | Refills: 0 | Status: SHIPPED | OUTPATIENT
Start: 2025-05-09

## 2025-05-09 RX ORDER — ACETAMINOPHEN 325 MG/1
975 TABLET ORAL ONCE
Status: COMPLETED | OUTPATIENT
Start: 2025-05-09 | End: 2025-05-09

## 2025-05-09 RX ADMIN — ACETAMINOPHEN 975 MG: 325 TABLET, FILM COATED ORAL at 14:35

## 2025-05-09 NOTE — DISCHARGE INSTRUCTIONS
Patient Education     Concussion, Adult ED   General Information   You came to the Emergency Department (ED) for a head injury and were diagnosed with a concussion. This is a mild brain or head injury. Many people recover quickly after a concussion. But, sometimes, symptoms can last for several days or longer.  The doctor feels that it is safe for you to go home.  What care is needed at home?   If the doctors told you to have someone stay with you, it is important that they understand what to watch for. They also need to know when to get emergency help.  Call your regular doctor to let them know you were in the ED. Make a follow-up appointment if you were told to.  Rest your body. Get plenty of sleep. Alternate rest with light activity like walking. Avoid heavy exercise if it makes you feel worse.  Rest your brain. For the first day, stay away from doing things that need a lot of thought or focus. Stay away from TV, computers, phone screens, and video games. After the first day, slowly introduce these activities. Stop them if they make you feel worse.  If your head hurts, you may want to take medicine like ibuprofen, naproxen, or acetaminophen.  When do I need to get emergency help?   Call for an ambulance right away if:   You have trouble waking up from sleep and remain groggy or confused once awake.  While you are awake, you become confused or have trouble thinking clearly.  You have trouble speaking or seeing.  You have trouble walking or cannot move a part of your body like an arm or leg.  You have a seizure.  You develop severe or worsening headaches.  You start throwing up.  When do I need to call the doctor?   You still have symptoms that interfere with your normal activities 1 week after your injury.  You feel generally weaker or more tired than usual.  You have new or worsening symptoms.  Last Reviewed Date   2020-08-04  Consumer Information Use and Disclaimer   This generalized information is a limited  summary of diagnosis, treatment, and/or medication information. It is not meant to be comprehensive and should be used as a tool to help the user understand and/or assess potential diagnostic and treatment options. It does NOT include all information about conditions, treatments, medications, side effects, or risks that may apply to a specific patient. It is not intended to be medical advice or a substitute for the medical advice, diagnosis, or treatment of a health care provider based on the health care provider's examination and assessment of a patient’s specific and unique circumstances. Patients must speak with a health care provider for complete information about their health, medical questions, and treatment options, including any risks or benefits regarding use of medications. This information does not endorse any treatments or medications as safe, effective, or approved for treating a specific patient. [employer] and its affiliates disclaim any warranty or liability relating to this information or the use thereof. The use of this information is governed by the Terms of Use, available at https://www.wolterskluwer.com/en/know/clinical-effectiveness-terms   Copyright   Copyright © 2024 [employer] and its affiliates and/or licensors. All rights reserved.

## 2025-05-09 NOTE — Clinical Note
Krys Alex was seen and treated in our emergency department on 5/9/2025.                Diagnosis:     Krys  may return to work on return date.    She may return on this date: 05/12/2025         If you have any questions or concerns, please don't hesitate to call.      Edi Blanton, DO    ______________________________           _______________          _______________  Hospital Representative                              Date                                Time

## 2025-05-12 NOTE — ED PROVIDER NOTES
Time reflects when diagnosis was documented in both MDM as applicable and the Disposition within this note       Time User Action Codes Description Comment    5/9/2025  2:49 PM Edi Blanton Add [R04.0] Epistaxis     5/9/2025  2:49 PM Edi Blanton Add [S06.0X0A] Concussion without loss of consciousness, initial encounter           ED Disposition       ED Disposition   Discharge    Condition   Stable    Date/Time   Fri May 9, 2025  2:49 PM    Comment   Krys Alex discharge to home/self care.                   Assessment & Plan       Medical Decision Making  This is a 50-year-old female with 2 weeks ago struck her head with noted symptoms of dizziness nausea and sinus pressure she had a nosebleed earlier today that is since resolved.  Differential diagnoses include concussion, nosebleed, intracranial hemorrhage,      Plan to be for CT head for evaluation after posttraumatic injury.  Likely concussion as a cause of the patient's symptoms her CT scan is unremarkable with no evidence of intracranial hemorrhage recommend outpatient follow-up with PCP.  Referred to the concussion clinic.    Pt re-examined and evaluated after testing and treatment. Spoke with the patient and feeling improved and sxs have resolved. Will discharge home with close f/u with pcp and instructed to return to the ED if sxs worsen or continue. Pt agrees with the plan for discharge and feels comfortable to go home with proper f/u. Advised to return for worsening or additional problems. Diagnostic tests were reviewed and questions answered. Diagnosis, care plan and treatment options were discussed. The patient understand instructions and will follow up as directed.  Counseling: I had a detailed discussion with the patient and/or guardian regarding: the historical points, exam findings, and any diagnostic results supporting the discharge diagnosis, lab results, radiology results, discharge instructions reviewed with patient and/or  family/caregiver and understanding was verbalized. Instructions given to return to the emergency department if symptoms worsen or persist, or if there are any questions or concerns that arise at home.  All labs reviewed and utilized in the medical decision making process  All radiology studies independently viewed by me and interpreted by the radiologist.    Amount and/or Complexity of Data Reviewed  Radiology: ordered.    Risk  OTC drugs.             Medications   acetaminophen (TYLENOL) tablet 975 mg (975 mg Oral Given 5/9/25 1435)       ED Risk Strat Scores                    No data recorded        SBIRT 20yo+      Flowsheet Row Most Recent Value   Initial Alcohol Screen: US AUDIT-C     1. How often do you have a drink containing alcohol? 0 Filed at: 05/09/2025 1431   2. How many drinks containing alcohol do you have on a typical day you are drinking?  0 Filed at: 05/09/2025 1431   3b. FEMALE Any Age, or MALE 65+: How often do you have 4 or more drinks on one occassion? 0 Filed at: 05/09/2025 1431   Audit-C Score 0 Filed at: 05/09/2025 1431   EDIN: How many times in the past year have you...    Used an illegal drug or used a prescription medication for non-medical reasons? Never Filed at: 05/09/2025 1431                            History of Present Illness       Chief Complaint   Patient presents with    Nose Bleed     Patient fell 2 weeks ago, missed a step hit her head on the wall, right posterior lateral head. Patient has been nauseated, dizzy and sinus pressure. Nosebleed on left nostril this am, no bleeding now.        Past Medical History:   Diagnosis Date    COVID-19 05/2022    Finger fracture, left     Intrinsic muscle tightness 01/16/2019    Pure hypercholesterolemia 10/14/2021    Trigger middle finger of left hand 01/16/2019    UTI symptoms 10/14/2021    Vitamin D deficiency       Past Surgical History:   Procedure Laterality Date    LAPAROSCOPIC OVARIAN CYSTECTOMY Right 2001    Lysis of adhesions,  Dr. Miranda    OVARIAN CYST REMOVAL Right 1993    open, Dr. Arce    TONSILECTOMY AND ADNOIDECTOMY      TONSILLECTOMY      WISDOM TOOTH EXTRACTION        Family History   Problem Relation Age of Onset    Lung cancer Mother 72        non small cell    No Known Problems Father     No Known Problems Sister     No Known Problems Sister     No Known Problems Daughter     No Known Problems Daughter     Hyperlipidemia Maternal Grandmother     Thyroid disease Maternal Grandmother     Lung cancer Maternal Grandmother 60    Coronary artery disease Maternal Grandfather     Hyperlipidemia Maternal Grandfather     Alcohol abuse Maternal Grandfather     Parkinsonism Maternal Grandfather     Heart disease Paternal Grandmother     Heart disease Paternal Grandfather     No Known Problems Brother     No Known Problems Maternal Aunt     No Known Problems Paternal Aunt     No Known Problems Paternal Uncle       Social History     Tobacco Use    Smoking status: Never    Smokeless tobacco: Never   Vaping Use    Vaping status: Never Used   Substance Use Topics    Alcohol use: Yes     Alcohol/week: 2.0 standard drinks of alcohol     Types: 2 Glasses of wine per week     Comment: occasional    Drug use: Never      E-Cigarette/Vaping    E-Cigarette Use Never User       E-Cigarette/Vaping Substances    Nicotine No     THC No     CBD No     Flavoring No     Other No     Unknown No       I have reviewed and agree with the history as documented.     This is a 50-year-old female presents emergency department with noted history of recent head trauma remote.  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  She was sent down for evaluation she is also complaining of sinus pressure, nausea and dizziness after the fall.  Taking over-the-counter medications with no improvement of symptoms.      Nose Bleed  Associated symptoms: headaches    Associated  symptoms: no cough, no fever and no sore throat        Review of Systems   Constitutional:  Negative for chills and fever.   HENT:  Positive for nosebleeds. Negative for ear pain and sore throat.    Eyes:  Negative for pain and visual disturbance.   Respiratory:  Negative for cough and shortness of breath.    Cardiovascular:  Negative for chest pain and palpitations.   Gastrointestinal:  Negative for abdominal pain and vomiting.   Genitourinary:  Negative for dysuria and hematuria.   Musculoskeletal:  Negative for arthralgias and back pain.   Skin:  Negative for color change and rash.   Neurological:  Positive for headaches. Negative for seizures and syncope.   All other systems reviewed and are negative.          Objective       ED Triage Vitals [05/09/25 1406]   Temperature Pulse Blood Pressure Respirations SpO2 Patient Position - Orthostatic VS   98.2 °F (36.8 °C) 86 165/98 17 98 % Sitting      Temp Source Heart Rate Source BP Location FiO2 (%) Pain Score    Oral -- Left arm -- 4      Vitals      Date and Time Temp Pulse SpO2 Resp BP Pain Score FACES Pain Rating User   05/09/25 1432 -- -- -- -- -- 7 -- JR   05/09/25 1406 98.2 °F (36.8 °C) 86 98 % 17 165/98 4 -- SA            Physical Exam  Vitals and nursing note reviewed.   Constitutional:       Appearance: She is well-developed.   HENT:      Head: Normocephalic and atraumatic.      Nose:      Comments: Dried blood in the left nare     Mouth/Throat:      Pharynx: No oropharyngeal exudate.   Eyes:      Pupils: Pupils are equal, round, and reactive to light.   Neck:      Trachea: No tracheal deviation.   Cardiovascular:      Rate and Rhythm: Normal rate.      Heart sounds: Normal heart sounds. No murmur heard.     No friction rub. No gallop.   Pulmonary:      Effort: Pulmonary effort is normal. No respiratory distress.      Breath sounds: No wheezing or rales.   Abdominal:      General: There is no distension.      Palpations: Abdomen is soft.      Tenderness:  There is no abdominal tenderness. There is no guarding or rebound.   Musculoskeletal:         General: No tenderness. Normal range of motion.      Cervical back: Normal range of motion and neck supple.   Skin:     General: Skin is warm.      Findings: No rash.   Neurological:      Mental Status: She is alert and oriented to person, place, and time.      Comments: GCS 15. AAOx4. No focal neuro deficits. CN II-XII intact. PERRL. EOMI. . No pronator drift.  strength 5/5 bilaterally. B/L UE strength 5/5 throughout. Finger to nose normal. Cerebellar function normal. Ambulates without difficulty. B/L LE strength 5/5 throughout. Gross sensation to b/l upper and lower extremities intact.             Results Reviewed       None            CT head without contrast   Final Interpretation by Ryan Jackson MD ([id number])      No acute intracranial abnormality.                  Workstation performed: [id number]             Procedures    ED Medication and Procedure Management   Prior to Admission Medications   Prescriptions Last Dose Informant Patient Reported? Taking?   Cholecalciferol (VITAMIN D3 PO)  Self Yes No   Sig: Take by mouth   Triamcinolone Acetonide (Nasacort Allergy 24HR) 55 MCG/ACT nasal spray  Self Yes No   azelastine (ASTELIN) 0.1 % nasal spray   Yes No   Si spray into each nostril 2 (two) times a day Use in each nostril as directed   butalbital-acetaminophen-caffeine (FIORICET,ESGIC) -40 mg per tablet   No No   Sig: Take 1 tablet by mouth every 6 (six) hours as needed for headaches   cyclobenzaprine (FLEXERIL) 10 mg tablet   No No   Sig: Take 1 tablet (10 mg total) by mouth daily at bedtime   diazepam (VALIUM) 5 mg tablet  Self No No   Sig: Take 2 tablets (10 mg total) by mouth every 8 (eight) hours as needed for anxiety (BACK SPASM) for up to 9 days   Patient not taking: Reported on 12/3/2024   ibuprofen (MOTRIN) 800 mg tablet  Self No No   Sig: Take 1 tablet (800 mg total) by mouth 3 (three)  times a day   loratadine (Claritin) 10 mg tablet  Self Yes No   meclizine (ANTIVERT) 12.5 MG tablet  Self No No   Sig: Take 1 tablet (12.5 mg total) by mouth 3 (three) times a day as needed for dizziness   Patient not taking: Reported on 12/4/2023   tobramycin-dexamethasone (TOBRADEX) ophthalmic suspension   No No   Sig: Administer 1 drop into the left eye every 4 (four) hours while awake   Patient not taking: Reported on 12/10/2024      Facility-Administered Medications: None     Discharge Medication List as of 5/9/2025  2:50 PM        CONTINUE these medications which have NOT CHANGED    Details   azelastine (ASTELIN) 0.1 % nasal spray 1 spray into each nostril 2 (two) times a day Use in each nostril as directed, Historical Med      butalbital-acetaminophen-caffeine (FIORICET,ESGIC) -40 mg per tablet Take 1 tablet by mouth every 6 (six) hours as needed for headaches, Starting Fri 5/9/2025, Normal      Cholecalciferol (VITAMIN D3 PO) Take by mouth, Historical Med      cyclobenzaprine (FLEXERIL) 10 mg tablet Take 1 tablet (10 mg total) by mouth daily at bedtime, Starting Mon 10/21/2024, Normal      diazepam (VALIUM) 5 mg tablet Take 2 tablets (10 mg total) by mouth every 8 (eight) hours as needed for anxiety (BACK SPASM) for up to 9 days, Starting Fri 4/26/2024, Until Tue 12/3/2024 at 2359, Normal      ibuprofen (MOTRIN) 800 mg tablet Take 1 tablet (800 mg total) by mouth 3 (three) times a day, Starting Fri 4/26/2024, Normal      loratadine (Claritin) 10 mg tablet Historical Med      meclizine (ANTIVERT) 12.5 MG tablet Take 1 tablet (12.5 mg total) by mouth 3 (three) times a day as needed for dizziness, Starting Wed 10/18/2023, Normal      tobramycin-dexamethasone (TOBRADEX) ophthalmic suspension Administer 1 drop into the left eye every 4 (four) hours while awake, Starting Thu 8/8/2024, Normal      Triamcinolone Acetonide (Nasacort Allergy 24HR) 55 MCG/ACT nasal spray Historical Med             ED SEPSIS  DOCUMENTATION   Time reflects when diagnosis was documented in both MDM as applicable and the Disposition within this note       Time User Action Codes Description Comment    5/9/2025  2:49 PM Edi Blanton [R04.0] Epistaxis     5/9/2025  2:49 PM Edi Blanton [S06.0X0A] Concussion without loss of consciousness, initial encounter                  Edi Blanton DO  05/12/25 1038

## 2025-06-06 ENCOUNTER — PROCEDURE VISIT (OUTPATIENT)
Age: 50
End: 2025-06-06
Payer: COMMERCIAL

## 2025-06-06 VITALS
SYSTOLIC BLOOD PRESSURE: 138 MMHG | HEART RATE: 85 BPM | HEIGHT: 66 IN | DIASTOLIC BLOOD PRESSURE: 86 MMHG | WEIGHT: 150 LBS | BODY MASS INDEX: 24.11 KG/M2

## 2025-06-06 DIAGNOSIS — M99.04 SEGMENTAL DYSFUNCTION OF SACRAL REGION: ICD-10-CM

## 2025-06-06 DIAGNOSIS — M79.18 MYOFASCIAL PAIN SYNDROME: ICD-10-CM

## 2025-06-06 DIAGNOSIS — M46.1 SACROILIITIS (HCC): Primary | ICD-10-CM

## 2025-06-06 DIAGNOSIS — M99.08 SOMATIC DYSFUNCTION OF COSTOVERTEBRAL JOINT STRUCTURE: ICD-10-CM

## 2025-06-06 DIAGNOSIS — M99.02 SEGMENTAL DYSFUNCTION OF THORACIC REGION: ICD-10-CM

## 2025-06-06 DIAGNOSIS — M24.552 HIP FLEXOR TENDON TIGHTNESS, LEFT: ICD-10-CM

## 2025-06-06 PROCEDURE — 98941 CHIROPRACT MANJ 3-4 REGIONS: CPT | Performed by: CHIROPRACTOR

## 2025-06-06 PROCEDURE — 97110 THERAPEUTIC EXERCISES: CPT | Performed by: CHIROPRACTOR

## 2025-06-07 NOTE — PROGRESS NOTES
Initial date of service: 7/10/24    Diagnoses and all orders for this visit:    Sacroiliitis (HCC)    Segmental dysfunction of sacral region    Hip flexor tendon tightness, left    Somatic dysfunction of costovertebral joint structure    Myofascial pain syndrome    Segmental dysfunction of thoracic region    Mechanical neck/back/sh pain in the setting of DDD, exacerbated by postural/ergonomic stressors. Pt suffered exacerbation but responded well to tx with reduced pain and increased ROM. F/up 3 wks    TREATMENT: 89331, 26627  Ther-ex: IASTM; discussed post procedure soreness and/or ecchymosis for up to 36 hrs, applied to affected mm hypertonicities; cobra stretch, standing extensions, hip flexor pin-and-stretch, transitional mvmt education, abdominal bracing; greater than 15 min spent performing above mentioned ther-ex to improve ROM/flexibility. Thoracic mobilization/manipulation: prone P-A mob, supine A-P manip; Lumbar mobilization/manipulation: diversified side laying graded HVLA, extension-traction; SIJ Manipulation/Mobilization: L SIJ HVLA - long axis distraction, mack drop table maneuver to affected SIJ    HPI:  Krys Alex is a 50 y.o. female  Chief Complaint   Patient presents with    Neck - Follow-up     Neck is good. Pain score 0    Back Pain     Lower lumbar pain down left leg and left heel    Patient states very tight     Pain score 8-9        Pt presents for tx fpor L sided neck to lower back pain after catching falling patient while bent and twisted to her right 4/26/24 while working for [employer]. Pt has undergone PT with modest benefit. Lumbar and Thoracic Spine MRIs 2024 demonstrate L4-L5: tiny right posterolateral annular fissure, small right foraminal disc protrusion. Mild facet arthropathy, trace facet joint effusions. Mild canal stenosis. Mild-to-moderate right and mild left foraminal narrowing L5-S1: Mild diffuse disc bulge, tiny right posterolateral annular fissure. Pt  was seen by Pain Mgmt who referred here for consutl/tx  6/6: Pt reports flare-up after missing appt; went to wrong location    Back Pain  This is a chronic problem. The current episode started more than 1 year ago. The problem occurs daily. The problem has been waxing and waning since onset. The pain is present in the gluteal, lumbar spine, sacro-iliac and thoracic spine. The quality of the pain is described as aching. The pain does not radiate. The pain is Worse during the day. The symptoms are aggravated by twisting and bending (transitional mvmts, prolonged walking/standing, uphill/downhill, cobra stretch; palliative includes rest, massage, traction, laying supine). Pertinent negatives include no bladder incontinence or bowel incontinence.   Shoulder Pain     Neck Pain       Past Medical History:   Diagnosis Date    COVID-19 05/2022    Finger fracture, left     Intrinsic muscle tightness 01/16/2019    Pure hypercholesterolemia 10/14/2021    Trigger middle finger of left hand 01/16/2019    UTI symptoms 10/14/2021    Vitamin D deficiency       Past Surgical History:   Procedure Laterality Date    LAPAROSCOPIC OVARIAN CYSTECTOMY Right 2001    Lysis of adhesions, Dr. Miranda    OVARIAN CYST REMOVAL Right 1993    open, Dr. Arce    TONSILECTOMY AND ADNOIDECTOMY      TONSILLECTOMY      WISDOM TOOTH EXTRACTION       The following portions of the patient's history were reviewed and updated as appropriate: allergies, past family history, past medical history, past social history, past surgical history, and problem list.  Review of Systems   Gastrointestinal:  Negative for bowel incontinence.   Genitourinary:  Negative for bladder incontinence.   Musculoskeletal:  Positive for back pain and neck pain.     Physical Exam    Musculoskeletal:      Thoracic back: Spasms and tenderness present. Decreased range of motion.      Lumbar back: Spasms and tenderness present. Decreased range of motion. Negative right straight leg raise test  and negative left straight leg raise test.        Back:       Comments: Pnful and limited in Rrot 10, Blf 15(contralaterally), Ext 15, Ext/Brot     Skin:     General: Skin is warm and dry.     Neurological:      Mental Status: She is alert and oriented to person, place, and time.      Gait: Gait is intact.     Psychiatric:         Mood and Affect: Mood normal.         Behavior: Behavior normal.       SOFT TISSUE ASSESSMENT Hypertonicity and tenderness palpated L T 3-7, 10-S1 erector spinae, L hip flexor, L glute med/min, L QL, L hamstring JOINT RESTRICTIONS: T3-7, 10-S1, L R6, L R9, and L SIJ     Return in about 3 weeks (around 6/27/2025) for Next scheduled follow up.

## 2025-06-16 ENCOUNTER — PROCEDURE VISIT (OUTPATIENT)
Age: 50
End: 2025-06-16
Payer: COMMERCIAL

## 2025-06-16 VITALS
BODY MASS INDEX: 24.11 KG/M2 | SYSTOLIC BLOOD PRESSURE: 104 MMHG | HEART RATE: 86 BPM | DIASTOLIC BLOOD PRESSURE: 87 MMHG | HEIGHT: 66 IN | WEIGHT: 150 LBS

## 2025-06-16 DIAGNOSIS — M99.02 SEGMENTAL DYSFUNCTION OF THORACIC REGION: ICD-10-CM

## 2025-06-16 DIAGNOSIS — M99.08 SOMATIC DYSFUNCTION OF COSTOVERTEBRAL JOINT STRUCTURE: ICD-10-CM

## 2025-06-16 DIAGNOSIS — M46.1 SACROILIITIS (HCC): Primary | ICD-10-CM

## 2025-06-16 DIAGNOSIS — M79.18 MYOFASCIAL PAIN SYNDROME: ICD-10-CM

## 2025-06-16 DIAGNOSIS — M24.552 HIP FLEXOR TENDON TIGHTNESS, LEFT: ICD-10-CM

## 2025-06-16 DIAGNOSIS — M99.04 SEGMENTAL DYSFUNCTION OF SACRAL REGION: ICD-10-CM

## 2025-06-16 PROCEDURE — 98941 CHIROPRACT MANJ 3-4 REGIONS: CPT | Performed by: CHIROPRACTOR

## 2025-06-16 PROCEDURE — 97110 THERAPEUTIC EXERCISES: CPT | Performed by: CHIROPRACTOR

## 2025-06-16 NOTE — PROGRESS NOTES
Initial date of service: 7/10/24    Diagnoses and all orders for this visit:    Sacroiliitis (HCC)    Segmental dysfunction of sacral region    Hip flexor tendon tightness, left    Somatic dysfunction of costovertebral joint structure    Myofascial pain syndrome    Segmental dysfunction of thoracic region    Mechanical neck/back/sh pain in the setting of DDD, exacerbated by postural/ergonomic stressors. Pt suffered exacerbation but responded well to tx with reduced pain and increased ROM. F/up 3 wks    TREATMENT: 41428, [id number]  Ther-ex: IASTM; discussed post procedure soreness and/or ecchymosis for up to 36 hrs, applied to affected mm hypertonicities; cobra stretch, standing extensions, hip flexor pin-and-stretch, transitional mvmt education, abdominal bracing; greater than 15 min spent performing above mentioned ther-ex to improve ROM/flexibility. Thoracic mobilization/manipulation: prone P-A mob, supine A-P manip; Lumbar mobilization/manipulation: diversified side laying graded HVLA, extension-traction; SIJ Manipulation/Mobilization: L SIJ HVLA - long axis distraction, mack drop table maneuver to affected SIJ    HPI:  Krys Alex is a 50 y.o. female  Chief Complaint   Patient presents with    Neck - Follow-up     Neck pain score 5          Back Pain     Lower lumbar pain that radiates down right thigh area and wraps around right knee .  Pain score 3-9       Pt presents for tx fpor L sided neck to lower back pain after catching falling patient while bent and twisted to her right 4/26/24 while working for [employer]. Pt has undergone PT with modest benefit. Lumbar and Thoracic Spine MRIs 2024 demonstrate L4-L5: tiny right posterolateral annular fissure, small right foraminal disc protrusion. Mild facet arthropathy, trace facet joint effusions. Mild canal stenosis. Mild-to-moderate right and mild left foraminal narrowing L5-S1: Mild diffuse disc bulge, tiny right posterolateral annular fissure.  Pt was seen by Pain Mgmt who referred here for consutl/tx  6/16: Pt reports flare-up after twisting while changing direction; felt mostly in R ITB    Back Pain  This is a chronic problem. The current episode started more than 1 year ago. The problem occurs daily. The problem has been waxing and waning since onset. The pain is present in the gluteal, lumbar spine, sacro-iliac and thoracic spine. The quality of the pain is described as aching. The pain does not radiate. The pain is Worse during the day. The symptoms are aggravated by twisting and bending (transitional mvmts, prolonged walking/standing, uphill/downhill, cobra stretch; palliative includes rest, massage, traction, laying supine). Pertinent negatives include no bladder incontinence or bowel incontinence.   Shoulder Pain     Neck Pain       Past Medical History:   Diagnosis Date    COVID-19 05/2022    Finger fracture, left     Intrinsic muscle tightness 01/16/2019    Pure hypercholesterolemia 10/14/2021    Trigger middle finger of left hand 01/16/2019    UTI symptoms 10/14/2021    Vitamin D deficiency       Past Surgical History:   Procedure Laterality Date    LAPAROSCOPIC OVARIAN CYSTECTOMY Right 2001    Lysis of adhesions, Dr. Miranda    OVARIAN CYST REMOVAL Right 1993    open, Dr. Arce    TONSILECTOMY AND ADNOIDECTOMY      TONSILLECTOMY      WISDOM TOOTH EXTRACTION       The following portions of the patient's history were reviewed and updated as appropriate: allergies, past family history, past medical history, past social history, past surgical history, and problem list.  Review of Systems   Gastrointestinal:  Negative for bowel incontinence.   Genitourinary:  Negative for bladder incontinence.   Musculoskeletal:  Positive for back pain and neck pain.     Physical Exam    Musculoskeletal:      Thoracic back: Spasms and tenderness present. Decreased range of motion.      Lumbar back: Spasms and tenderness present. Decreased range of motion. Negative right  straight leg raise test and negative left straight leg raise test.        Back:       Comments: Pnful and limited in Rrot 10, Blf 15(contralaterally), Ext 15, Ext/Brot     Skin:     General: Skin is warm and dry.     Neurological:      Mental Status: She is alert and oriented to person, place, and time.      Gait: Gait is intact.     Psychiatric:         Mood and Affect: Mood normal.         Behavior: Behavior normal.       SOFT TISSUE ASSESSMENT Hypertonicity and tenderness palpated L T 3-7, 10-S1 erector spinae, L hip flexor, L glute med/min, L QL, L hamstring JOINT RESTRICTIONS: T3-7, 10-S1, L R6, L R9, and L SIJ     Return in about 2 weeks (around 6/30/2025) for Next scheduled follow up.

## 2025-06-25 ENCOUNTER — PROCEDURE VISIT (OUTPATIENT)
Age: 50
End: 2025-06-25
Payer: COMMERCIAL

## 2025-06-25 VITALS — HEIGHT: 66 IN | BODY MASS INDEX: 24.11 KG/M2 | WEIGHT: 150 LBS

## 2025-06-25 DIAGNOSIS — M79.18 MYOFASCIAL PAIN SYNDROME: ICD-10-CM

## 2025-06-25 DIAGNOSIS — M99.08 SOMATIC DYSFUNCTION OF COSTOVERTEBRAL JOINT STRUCTURE: ICD-10-CM

## 2025-06-25 DIAGNOSIS — M24.552 HIP FLEXOR TENDON TIGHTNESS, LEFT: ICD-10-CM

## 2025-06-25 DIAGNOSIS — M99.04 SEGMENTAL DYSFUNCTION OF SACRAL REGION: ICD-10-CM

## 2025-06-25 DIAGNOSIS — M46.1 SACROILIITIS (HCC): Primary | ICD-10-CM

## 2025-06-25 DIAGNOSIS — M99.02 SEGMENTAL DYSFUNCTION OF THORACIC REGION: ICD-10-CM

## 2025-06-25 PROCEDURE — 98941 CHIROPRACT MANJ 3-4 REGIONS: CPT | Performed by: CHIROPRACTOR

## 2025-06-25 PROCEDURE — 97110 THERAPEUTIC EXERCISES: CPT | Performed by: CHIROPRACTOR

## 2025-06-26 NOTE — PROGRESS NOTES
Initial date of service: 7/10/24    Diagnoses and all orders for this visit:    Sacroiliitis (HCC)    Segmental dysfunction of sacral region    Hip flexor tendon tightness, left    Somatic dysfunction of costovertebral joint structure    Myofascial pain syndrome    Segmental dysfunction of thoracic region    Mechanical neck/back/sh pain in the setting of DDD, exacerbated by postural/ergonomic stressors. Pt suffered exacerbation but responded well to tx with reduced pain and increased ROM. F/up 3 wks    TREATMENT: 49727, [id number]  Ther-ex: IASTM; discussed post procedure soreness and/or ecchymosis for up to 36 hrs, applied to affected mm hypertonicities; cobra stretch, standing extensions, hip flexor pin-and-stretch, transitional mvmt education, abdominal bracing; greater than 15 min spent performing above mentioned ther-ex to improve ROM/flexibility. Thoracic mobilization/manipulation: prone P-A mob, supine A-P manip; Lumbar mobilization/manipulation: diversified side laying graded HVLA, extension-traction; SIJ Manipulation/Mobilization: L SIJ HVLA - long axis distraction, mack drop table maneuver to affected SIJ    HPI:  Krys Alex is a 50 y.o. female  Chief Complaint   Patient presents with    Neck - Follow-up     Neck pain score 4        Back Pain     Lower lumbar and right thigh, right knee pain score  4-9     Pt presents for tx fpor L sided neck to lower back pain after catching falling patient while bent and twisted to her right 4/26/24 while working for [employer]. Pt has undergone PT with modest benefit. Lumbar and Thoracic Spine MRIs 2024 demonstrate L4-L5: tiny right posterolateral annular fissure, small right foraminal disc protrusion. Mild facet arthropathy, trace facet joint effusions. Mild canal stenosis. Mild-to-moderate right and mild left foraminal narrowing L5-S1: Mild diffuse disc bulge, tiny right posterolateral annular fissure. Pt was seen by Pain Mgmt who referred here for  consutl/tx  6/25: Pt reports neck better but still bothersome; lbp painful    Back Pain  This is a chronic problem. The current episode started more than 1 year ago. The problem occurs daily. The problem has been waxing and waning since onset. The pain is present in the gluteal, lumbar spine, sacro-iliac and thoracic spine. The quality of the pain is described as aching. The pain does not radiate. The pain is Worse during the day. The symptoms are aggravated by twisting and bending (transitional mvmts, prolonged walking/standing, uphill/downhill, cobra stretch; palliative includes rest, massage, traction, laying supine). Pertinent negatives include no bladder incontinence or bowel incontinence.   Shoulder Pain     Neck Pain       Past Medical History:   Diagnosis Date    COVID-19 05/2022    Finger fracture, left     Intrinsic muscle tightness 01/16/2019    Pure hypercholesterolemia 10/14/2021    Trigger middle finger of left hand 01/16/2019    UTI symptoms 10/14/2021    Vitamin D deficiency       Past Surgical History:   Procedure Laterality Date    LAPAROSCOPIC OVARIAN CYSTECTOMY Right 2001    Lysis of adhesions, Dr. Miranda    OVARIAN CYST REMOVAL Right 1993    open, Dr. Arce    TONSILECTOMY AND ADNOIDECTOMY      TONSILLECTOMY      WISDOM TOOTH EXTRACTION       The following portions of the patient's history were reviewed and updated as appropriate: allergies, past family history, past medical history, past social history, past surgical history, and problem list.  Review of Systems   Gastrointestinal:  Negative for bowel incontinence.   Genitourinary:  Negative for bladder incontinence.   Musculoskeletal:  Positive for back pain and neck pain.     Physical Exam    Musculoskeletal:      Thoracic back: Spasms and tenderness present. Decreased range of motion.      Lumbar back: Spasms and tenderness present. Decreased range of motion. Negative right straight leg raise test and negative left straight leg raise test.         Back:       Comments: Pnful and limited in Rrot 10, Blf 15(contralaterally), Ext 15, Ext/Brot     Skin:     General: Skin is warm and dry.     Neurological:      Mental Status: She is alert and oriented to person, place, and time.      Gait: Gait is intact.     Psychiatric:         Mood and Affect: Mood normal.         Behavior: Behavior normal.       SOFT TISSUE ASSESSMENT Hypertonicity and tenderness palpated L T 3-7, 10-S1 erector spinae, L hip flexor, L glute med/min, L QL, L hamstring JOINT RESTRICTIONS: T3-7, 10-S1, L R6, L R9, and L SIJ     Return in about 2 weeks (around 7/9/2025) for Next scheduled follow up.

## 2025-07-07 ENCOUNTER — PROCEDURE VISIT (OUTPATIENT)
Age: 50
End: 2025-07-07

## 2025-07-07 VITALS
HEIGHT: 66 IN | WEIGHT: 150 LBS | DIASTOLIC BLOOD PRESSURE: 83 MMHG | BODY MASS INDEX: 24.11 KG/M2 | SYSTOLIC BLOOD PRESSURE: 123 MMHG | HEART RATE: 80 BPM

## 2025-07-07 DIAGNOSIS — M99.04 SEGMENTAL DYSFUNCTION OF SACRAL REGION: ICD-10-CM

## 2025-07-07 DIAGNOSIS — M46.1 SACROILIITIS (HCC): Primary | ICD-10-CM

## 2025-07-07 DIAGNOSIS — M99.02 SEGMENTAL DYSFUNCTION OF THORACIC REGION: ICD-10-CM

## 2025-07-07 DIAGNOSIS — M99.08 SOMATIC DYSFUNCTION OF COSTOVERTEBRAL JOINT STRUCTURE: ICD-10-CM

## 2025-07-07 DIAGNOSIS — M24.552 HIP FLEXOR TENDON TIGHTNESS, LEFT: ICD-10-CM

## 2025-07-07 DIAGNOSIS — M75.81 ROTATOR CUFF TENDONITIS, RIGHT: ICD-10-CM

## 2025-07-07 DIAGNOSIS — M79.18 MYOFASCIAL PAIN SYNDROME: ICD-10-CM

## 2025-07-07 DIAGNOSIS — M51.360 DEGENERATION OF INTERVERTEBRAL DISC OF LUMBAR REGION WITH DISCOGENIC BACK PAIN: ICD-10-CM

## 2025-07-07 NOTE — PROGRESS NOTES
Initial date of service: 7/10/24    Diagnoses and all orders for this visit:    Sacroiliitis (HCC)    Segmental dysfunction of sacral region    Hip flexor tendon tightness, left    Somatic dysfunction of costovertebral joint structure    Myofascial pain syndrome    Segmental dysfunction of thoracic region    Rotator cuff tendonitis, right    Degeneration of intervertebral disc of lumbar region with discogenic back pain    Mechanical neck/back/sh pain in the setting of DDD, exacerbated by postural/ergonomic stressors. Pt suffered exacerbation but responded well to tx with reduced pain and increased ROM. F/up 3 wks    TREATMENT: 14080, [id number]  Ther-ex: IASTM; discussed post procedure soreness and/or ecchymosis for up to 36 hrs, applied to affected mm hypertonicities; cobra stretch, standing extensions, hip flexor pin-and-stretch, transitional mvmt education, abdominal bracing; greater than 15 min spent performing above mentioned ther-ex to improve ROM/flexibility. Thoracic mobilization/manipulation: prone P-A mob, supine A-P manip; Lumbar mobilization/manipulation: diversified side laying graded HVLA, extension-traction; SIJ Manipulation/Mobilization: L SIJ HVLA - long axis distraction, mack drop table maneuver to affected SIJ    HPI:  Krys Alex is a 50 y.o. female  Chief Complaint   Patient presents with    Neck - Follow-up     Neck is tight   Pain score 8        Back Pain     Lower lumbar and right thigh area has dull pain with tightness    Pain score 6       Pt presents for tx fpor L sided neck to lower back pain after catching falling patient while bent and twisted to her right 4/26/24 while working for [employer]. Pt has undergone PT with modest benefit. Lumbar and Thoracic Spine MRIs 2024 demonstrate L4-L5: tiny right posterolateral annular fissure, small right foraminal disc protrusion. Mild facet arthropathy, trace facet joint effusions. Mild canal stenosis. Mild-to-moderate right and  mild left foraminal narrowing L5-S1: Mild diffuse disc bulge, tiny right posterolateral annular fissure. Pt was seen by Pain Mgmt who referred here for consutl/tx  7/7: Pt reports neck flared    Back Pain  This is a chronic problem. The current episode started more than 1 year ago. The problem occurs daily. The problem has been waxing and waning since onset. The pain is present in the gluteal, lumbar spine, sacro-iliac and thoracic spine. The quality of the pain is described as aching. The pain does not radiate. The pain is Worse during the day. The symptoms are aggravated by twisting and bending (transitional mvmts, prolonged walking/standing, uphill/downhill, cobra stretch; palliative includes rest, massage, traction, laying supine). Pertinent negatives include no bladder incontinence or bowel incontinence.   Shoulder Pain     Neck Pain       Past Medical History:   Diagnosis Date    COVID-19 05/2022    Finger fracture, left     Intrinsic muscle tightness 01/16/2019    Pure hypercholesterolemia 10/14/2021    Trigger middle finger of left hand 01/16/2019    UTI symptoms 10/14/2021    Vitamin D deficiency       Past Surgical History:   Procedure Laterality Date    LAPAROSCOPIC OVARIAN CYSTECTOMY Right 2001    Lysis of adhesions, Dr. Miranda    OVARIAN CYST REMOVAL Right 1993    open, Dr. Arce    TONSILECTOMY AND ADNOIDECTOMY      TONSILLECTOMY      WISDOM TOOTH EXTRACTION       The following portions of the patient's history were reviewed and updated as appropriate: allergies, past family history, past medical history, past social history, past surgical history, and problem list.  Review of Systems   Gastrointestinal:  Negative for bowel incontinence.   Genitourinary:  Negative for bladder incontinence.   Musculoskeletal:  Positive for back pain and neck pain.     Physical Exam    Musculoskeletal:      Thoracic back: Spasms and tenderness present. Decreased range of motion.      Lumbar back: Spasms and tenderness  present. Decreased range of motion. Negative right straight leg raise test and negative left straight leg raise test.        Back:       Comments: Pnful and limited in Rrot 10, Blf 15(contralaterally), Ext 15, Ext/Brot     Skin:     General: Skin is warm and dry.     Neurological:      Mental Status: She is alert and oriented to person, place, and time.      Gait: Gait is intact.     Psychiatric:         Mood and Affect: Mood normal.         Behavior: Behavior normal.       SOFT TISSUE ASSESSMENT Hypertonicity and tenderness palpated L T 3-7, 10-S1 erector spinae, L hip flexor, L glute med/min, L QL, L hamstring JOINT RESTRICTIONS: T3-7, 10-S1, L R6, L R9, and L SIJ     Return in about 1 week (around 7/14/2025) for Next scheduled follow up.

## 2025-07-10 ENCOUNTER — APPOINTMENT (OUTPATIENT)
Dept: LAB | Facility: HOSPITAL | Age: 50
End: 2025-07-10

## 2025-07-10 DIAGNOSIS — Z00.8 HEALTH EXAMINATION IN POPULATION SURVEY: ICD-10-CM

## 2025-07-10 LAB
CHOLEST SERPL-MCNC: 265 MG/DL (ref ?–200)
EST. AVERAGE GLUCOSE BLD GHB EST-MCNC: 111 MG/DL
HBA1C MFR BLD: 5.5 %
HDLC SERPL-MCNC: 87 MG/DL
LDLC SERPL CALC-MCNC: 159 MG/DL (ref 0–100)
NONHDLC SERPL-MCNC: 178 MG/DL
TRIGL SERPL-MCNC: 93 MG/DL (ref ?–150)

## 2025-07-10 PROCEDURE — 80061 LIPID PANEL: CPT

## 2025-07-10 PROCEDURE — 36415 COLL VENOUS BLD VENIPUNCTURE: CPT

## 2025-07-10 PROCEDURE — 83036 HEMOGLOBIN GLYCOSYLATED A1C: CPT

## 2025-07-29 DIAGNOSIS — G43.909 MIGRAINE HEADACHE: ICD-10-CM

## 2025-07-30 ENCOUNTER — OFFICE VISIT (OUTPATIENT)
Dept: SURGERY | Facility: CLINIC | Age: 50
End: 2025-07-30
Payer: COMMERCIAL

## 2025-07-30 DIAGNOSIS — D22.9 SKIN MOLE: Primary | ICD-10-CM

## 2025-07-30 PROCEDURE — 88342 IMHCHEM/IMCYTCHM 1ST ANTB: CPT | Performed by: STUDENT IN AN ORGANIZED HEALTH CARE EDUCATION/TRAINING PROGRAM

## 2025-07-30 PROCEDURE — 88341 IMHCHEM/IMCYTCHM EA ADD ANTB: CPT | Performed by: STUDENT IN AN ORGANIZED HEALTH CARE EDUCATION/TRAINING PROGRAM

## 2025-07-30 PROCEDURE — 88307 TISSUE EXAM BY PATHOLOGIST: CPT | Performed by: STUDENT IN AN ORGANIZED HEALTH CARE EDUCATION/TRAINING PROGRAM

## 2025-07-30 RX ORDER — BUTALBITAL, ACETAMINOPHEN AND CAFFEINE 50; 325; 40 MG/1; MG/1; MG/1
1 TABLET ORAL EVERY 6 HOURS PRN
Qty: 15 TABLET | Refills: 0 | Status: SHIPPED | OUTPATIENT
Start: 2025-07-30